# Patient Record
Sex: FEMALE | Race: BLACK OR AFRICAN AMERICAN | NOT HISPANIC OR LATINO | Employment: FULL TIME | URBAN - METROPOLITAN AREA
[De-identification: names, ages, dates, MRNs, and addresses within clinical notes are randomized per-mention and may not be internally consistent; named-entity substitution may affect disease eponyms.]

---

## 2017-01-08 ENCOUNTER — HOSPITAL ENCOUNTER (EMERGENCY)
Facility: HOSPITAL | Age: 37
Discharge: HOME | End: 2017-01-08
Attending: EMERGENCY MEDICINE
Payer: MEDICAID

## 2017-01-08 VITALS
RESPIRATION RATE: 18 BRPM | HEART RATE: 68 BPM | OXYGEN SATURATION: 100 % | DIASTOLIC BLOOD PRESSURE: 68 MMHG | SYSTOLIC BLOOD PRESSURE: 107 MMHG | WEIGHT: 180 LBS | BODY MASS INDEX: 28.25 KG/M2 | HEIGHT: 67 IN | TEMPERATURE: 98 F

## 2017-01-08 DIAGNOSIS — R05.9 COUGH IN ADULT PATIENT: ICD-10-CM

## 2017-01-08 DIAGNOSIS — J02.9 SORE THROAT: ICD-10-CM

## 2017-01-08 DIAGNOSIS — J06.9 UPPER RESPIRATORY TRACT INFECTION, UNSPECIFIED TYPE: Primary | ICD-10-CM

## 2017-01-08 PROCEDURE — 96372 THER/PROPH/DIAG INJ SC/IM: CPT

## 2017-01-08 PROCEDURE — 99283 EMERGENCY DEPT VISIT LOW MDM: CPT | Mod: 25

## 2017-01-08 PROCEDURE — 63600175 PHARM REV CODE 636 W HCPCS: Performed by: EMERGENCY MEDICINE

## 2017-01-08 RX ORDER — BETAMETHASONE SODIUM PHOSPHATE AND BETAMETHASONE ACETATE 3; 3 MG/ML; MG/ML
6 INJECTION, SUSPENSION INTRA-ARTICULAR; INTRALESIONAL; INTRAMUSCULAR; SOFT TISSUE
Status: COMPLETED | OUTPATIENT
Start: 2017-01-08 | End: 2017-01-08

## 2017-01-08 RX ADMIN — BETAMETHASONE SODIUM PHOSPHATE AND BETAMETHASONE ACETATE 6 MG: 3; 3 INJECTION, SUSPENSION INTRA-ARTICULAR; INTRALESIONAL; INTRAMUSCULAR at 11:01

## 2017-01-08 NOTE — ED PROVIDER NOTES
SCRIBE #1 NOTE: I, Deepa Vernon, am scribing for, and in the presence of, Ignacia Mccracken MD. I have scribed the entire note.      History      Chief Complaint   Patient presents with    URI     patient c/o cough, congestion, sinus pressure, body aches and rib pain for the past 2 weeks       Review of patient's allergies indicates:  No Known Allergies     HPI   HPI    1/8/2017, 10:56 AM   History obtained from the patient      History of Present Illness: Janice Jay is a 36 y.o. female patient who presents to the Emergency Department for sore throat which onset gradually 2 weeks ago. Pt also reports dry cough, congestion, L rib pain and pain with coughing.  Symptoms are constant and mild in severity. Prior tx include Zyrtec, Nyquil, and Motrin with no relief. Pt does not report any factors that exacerbate or improve the symptoms. No other sxs reported. Patient denies fever, chills, CP, SOB, abd pain, N/V/D, HA, and all other sxs at this time. No further complaints or concerns at this time.       Arrival mode: Personal vehicle    PCP: Primary Doctor No       Past Medical History:  History reviewed. No pertinent past medical history.    Past Surgical History:  Past Surgical History   Procedure Laterality Date    Breast lumpectomy      Tubal ligation      Hysterectomy           Family History:  Family History   Problem Relation Age of Onset    Heart disease Mother     Hypertension Mother     Diabetes Mother        Social History:  Social History     Social History Main Topics    Smoking status: Current Some Day Smoker     Packs/day: 1.00     Types: Cigarettes    Smokeless tobacco: Unknown    Alcohol use No    Drug use: No    Sexual activity: Unknown       ROS   Review of Systems   Constitutional: Negative for chills and fever.   HENT: Positive for congestion and sore throat. Negative for rhinorrhea.    Respiratory: Positive for cough. Negative for chest tightness and shortness of breath.   "  Cardiovascular: Negative for chest pain.   Gastrointestinal: Negative for nausea.   Genitourinary: Negative for dysuria.   Musculoskeletal: Negative for back pain.        (+) L rib pain   Skin: Negative for rash.   Neurological: Negative for weakness and headaches.   Hematological: Does not bruise/bleed easily.   All other systems reviewed and are negative.      Physical Exam    Initial Vitals   BP Pulse Resp Temp SpO2   01/08/17 1044 01/08/17 1044 01/08/17 1044 01/08/17 1044 01/08/17 1044   130/77 67 18 98.3 °F (36.8 °C) 99 %      Physical Exam  Nursing Notes and Vital Signs Reviewed.  Constitutional: Patient is in no acute distress. Awake and alert. Well-developed and well-nourished.  Head: Atraumatic. Normocephalic.  Eyes: PERRL. EOM intact. Conjunctivae are not pale. No scleral icterus.  ENT: Mucous membranes are moist. Oropharynx is clear and symmetric. No pharyngeal erythema or exudates.   Neck: Supple. Full ROM. No lymphadenopathy.  Cardiovascular: Regular rate. Regular rhythm. No murmurs, rubs, or gallops. Distal pulses are 2+ and symmetric.  Pulmonary/Chest: No respiratory distress. Clear to auscultation bilaterally. No wheezing, rales, or rhonchi.  Abdominal: Soft and non-distended.  There is no tenderness.  No rebound, guarding, or rigidity.  Good bowel sounds.    Genitourinary: No CVA tenderness  Musculoskeletal: Moves all extremities. No obvious deformities. No edema. No calf tenderness.  Skin: Warm and dry.  Neurological:  Alert, awake, and appropriate.  Normal speech.  No acute focal neurological deficits are appreciated.  Psychiatric: Normal affect. Good eye contact. Appropriate in content.    ED Course    Procedures  ED Vital Signs:  Vitals:    01/08/17 1044 01/08/17 1110   BP: 130/77 107/68   Pulse: 67 68   Resp: 18 18   Temp: 98.3 °F (36.8 °C)    TempSrc: Oral    SpO2: 99% 100%   Weight: 81.6 kg (180 lb)    Height: 5' 7" (1.702 m)      The Emergency Provider reviewed the vital signs and test " results, which are outlined above.    ED Discussion     11:00 AM: Reassessed pt at this time. Discussed pt dx and plan of tx. Informed pt to follow up with PCP. All questions and concerns were addressed at this time. Pt expresses understanding of information and instructions, and is comfortable with plan to discharge. Pt is stable for discharge.    I discussed with patient that evaluation in the ED does not suggest any emergent or life threatening medical conditions requiring immediate intervention beyond what was provided in the ED, and I believe patient is safe for discharge.  Regardless, an unremarkable evaluation in the ED does not preclude the development or presence of a serious of life threatening condition. As such, patient was instructed to return immediately for any worsening or change in current symptoms.      ED Medication(s):  Medications   betamethasone acetate-betamethasone sodium phosphate injection 6 mg (6 mg Intramuscular Given 1/8/17 1104)       Discharge Medication List as of 1/8/2017 11:11 AM          Follow-up Information     Follow up with [facility]. Schedule an appointment as soon as possible for a visit in 2 days.    Why:  Return to the Emergency Room, If symptoms worsen    Contact information:    [address] [facility] [address]  [phone]             Medical Decision Making        Additional MDM:   Smoking Cessation: The patient is a smoker. The patient was counseled on smoking cessation for: 3 minutes. The patient was counseled on tobacco related  health complications.        Scribe Attestation:   Scribe #1: I performed the above scribed service and the documentation accurately describes the services I performed. I attest to the accuracy of the note.    Attending:   Physician Attestation Statement for Scribe #1: I, Ignacia Mccracken MD, personally performed the services described in this documentation, as scribed by Deepa Vernon, in my presence, and it is  both accurate and complete.          Clinical Impression       ICD-10-CM ICD-9-CM   1. Upper respiratory tract infection, unspecified type J06.9 465.9   2. Cough in adult patient R05 786.2   3. Sore throat J02.9 462       Disposition:   Disposition: Discharged  Condition: Stable         Ignacia Mccracken MD  01/08/17 3683

## 2017-01-08 NOTE — ED AVS SNAPSHOT
[facility]  [address] [address]               Janice Jay   2017 10:46 AM   ED    Description:  Female : 1980   Department:  [facility]            Your Care was Coordinated By:     Provider Role From To    Ignacia Mccracken MD Attending Provider 17 1046 --      Reason for Visit     URI           Diagnoses this Visit        Comments    Upper respiratory tract infection, unspecified type    -  Primary     Cough in adult patient         Sore throat           ED Disposition     None           To Do List           Follow-up Information     Follow up with [facility]. Schedule an appointment as soon as possible for a visit in 2 days.    Why:  Return to the Emergency Room, If symptoms worsen    Contact information:    [facility] [facility] [address]  [phone]        [facility] On Call     [facility] Nurse Care Line -  Assistance  Registered nurses in the [facility] Center provide clinical advisement, health education, appointment booking, and other advisory services.  Call for this free service at [phone].             Medications           Message regarding Medications     Verify the changes and/or additions to your medication regime listed below are the same as discussed with your clinician today.  If any of these changes or additions are incorrect, please notify your healthcare provider.        These medications were administered today        Dose Freq    betamethasone acetate-betamethasone sodium phosphate injection 6 mg 6 mg ED 1 Time    Sig: Inject 1 mL (6 mg total) into the muscle ED 1 Time.    Class: Normal    Route: Intramuscular           Verify that the below list of medications is an accurate representation of the medications you are currently taking.  If none reported, the list may be blank. If incorrect, please contact your healthcare provider. Carry  "this list with you in case of emergency.           Current Medications     albuterol 90 mcg/actuation inhaler Inhale 2 puffs into the lungs every 4 (four) hours as needed for Wheezing.    cephALEXin (KEFLEX) 500 MG capsule Take 500 mg by mouth 4 (four) times daily.    diclofenac (VOLTAREN) 50 MG EC tablet Take 1 tablet (50 mg total) by mouth 3 (three) times daily as needed.    medroxyPROGESTERone (DEPO-PROVERA) 150 mg/mL injection Inject 150 mg into the muscle every 3 (three) months.    naproxen (NAPROSYN) 375 MG tablet Take 1 tablet (375 mg total) by mouth 2 (two) times daily with meals.    omeprazole (PRILOSEC) 20 MG capsule Take 1 capsule (20 mg total) by mouth once daily.           Clinical Reference Information           Your Vitals Were     BP Pulse Temp Resp Height Weight    130/77 (BP Location: Right arm, Patient Position: Sitting) 67 98.3 °F (36.8 °C) (Oral) 18 5' 7" (1.702 m) 81.6 kg (180 lb)    SpO2 BMI             99% 28.19 kg/m2         Allergies as of 1/8/2017     No Known Allergies      Immunizations Administered on Date of Encounter - 1/8/2017     None      ED Micro, Lab, POCT     None      ED Imaging Orders     None      Discharge References/Attachments     SORE THROATS, SELF-CARE FOR (ENGLISH)    URI, VIRAL, NO ABX (ADULT) (ENGLISH)      [facility] Sign-Up     Activating your [facility] account is as easy as 1-2-3!     1) Visit [email or web address], select Sign Up Now, enter this activation code and your date of birth, then select Next.  [email or web address]  Expires: 2/22/2017 11:11 AM      2) Create a username and password to use when you visit [facility] in the future and select a security question in case you lose your password and select Next.    3) Enter your e-mail address and click Sign Up!    Additional Information  If you have questions, please e-mail [email or web address] or call [phone] to talk to our [facility] staff. Remember, [facility] is NOT to be used for urgent needs. For medical " emergencies, dial 911.         Smoking Cessation     If you would like to quit smoking:   You may be eligible for free services if you are a [place] resident and started smoking cigarettes before September 1, 1988.  Call the Smoking Cessation Trust (SCT) toll free at (639) 018-8277 or (067) 708-3523.   Call 1-800-QUIT-NOW if you do not meet the above criteria.             [facility] complies with applicable Federal civil rights laws and does not discriminate on the basis of race, color, national origin, age, disability, or sex.        Language Assistance Services     ATTENTION: Language assistance services are available, free of charge. Please call [phone].      ATENCIÓN: Si habla español, tiene a goyal disposición servicios gratuitos de asistencia lingüística. Llame al [phone].     CHÚ Ý: N?u b?n nói Ti?ng Vi?t, có các d?ch v? h? tr? ngôn ng? mi?n phí dành cho b?n. G?i s? [phone].

## 2017-02-28 ENCOUNTER — HOSPITAL ENCOUNTER (EMERGENCY)
Facility: HOSPITAL | Age: 37
Discharge: HOME | End: 2017-02-28
Attending: EMERGENCY MEDICINE
Payer: MEDICAID

## 2017-02-28 VITALS
WEIGHT: 200 LBS | HEART RATE: 106 BPM | SYSTOLIC BLOOD PRESSURE: 144 MMHG | DIASTOLIC BLOOD PRESSURE: 87 MMHG | TEMPERATURE: 98 F | HEIGHT: 67 IN | RESPIRATION RATE: 20 BRPM | OXYGEN SATURATION: 96 % | BODY MASS INDEX: 31.39 KG/M2

## 2017-02-28 DIAGNOSIS — S02.5XXA CLOSED FRACTURE OF TOOTH, INITIAL ENCOUNTER: ICD-10-CM

## 2017-02-28 DIAGNOSIS — K08.89 PAIN, DENTAL: Primary | ICD-10-CM

## 2017-02-28 PROCEDURE — 25000003 PHARM REV CODE 250: Performed by: EMERGENCY MEDICINE

## 2017-02-28 PROCEDURE — 99283 EMERGENCY DEPT VISIT LOW MDM: CPT

## 2017-02-28 RX ORDER — HYDROCODONE BITARTRATE AND ACETAMINOPHEN 10; 325 MG/1; MG/1
1 TABLET ORAL
Status: COMPLETED | OUTPATIENT
Start: 2017-02-28 | End: 2017-02-28

## 2017-02-28 RX ORDER — PENICILLIN V POTASSIUM 500 MG/1
500 TABLET, FILM COATED ORAL EVERY 8 HOURS
Qty: 21 TABLET | Refills: 0 | Status: SHIPPED | OUTPATIENT
Start: 2017-02-28 | End: 2017-03-07

## 2017-02-28 RX ORDER — HYDROCODONE BITARTRATE AND ACETAMINOPHEN 7.5; 325 MG/1; MG/1
1 TABLET ORAL EVERY 6 HOURS PRN
Qty: 20 TABLET | Refills: 0 | Status: SHIPPED | OUTPATIENT
Start: 2017-02-28 | End: 2017-09-28

## 2017-02-28 RX ADMIN — HYDROCODONE BITARTRATE AND ACETAMINOPHEN 1 TABLET: 10; 325 TABLET ORAL at 06:02

## 2017-02-28 NOTE — ED AVS SNAPSHOT
[facility]  [address] [facility] [address]               Janice Jay   2017  6:15 PM   ED    Description:  Female : 1980   Department:  [facility]            Your Care was Coordinated By:     Provider Role From To    Juan C Vernon Jr., MD Attending Provider 17 7298 --      Reason for Visit     Dental Pain           Diagnoses this Visit        Comments    Pain, dental    -  Primary     Closed fracture of tooth, initial encounter           ED Disposition     None           To Do List           Follow-up Information     Call [facility].    Why:  As needed to schedule appt with dentist for further treatment of your toothpain/ fractured caried right upper tooth    Contact information:    [address] [facility] [address] [phone]         These Medications        Disp Refills Start End    hydrocodone-acetaminophen 7.5-325mg (NORCO) 7.5-325 mg per tablet 20 tablet 0 2017     Take 1 tablet by mouth every 6 (six) hours as needed for Pain. - Oral    penicillin v potassium (VEETID) 500 MG tablet 21 tablet 0 2017 3/7/2017    Take 1 tablet (500 mg total) by mouth every 8 (eight) hours. - Oral      [facility] On Call     [facility] On Call Nurse Care Line -  Assistance  Registered nurses in the [facility] On Call Center provide clinical advisement, health education, appointment booking, and other advisory services.  Call for this free service at [phone].             Medications           Message regarding Medications     Verify the changes and/or additions to your medication regime listed below are the same as discussed with your clinician today.  If any of these changes or additions are incorrect, please notify your healthcare provider.        START taking these NEW medications        Refills    hydrocodone-acetaminophen 7.5-325mg (NORCO) 7.5-325 mg per tablet 0    Sig: Take 1 tablet  by mouth every 6 (six) hours as needed for Pain.    Class: Print    Route: Oral    penicillin v potassium (VEETID) 500 MG tablet 0    Sig: Take 1 tablet (500 mg total) by mouth every 8 (eight) hours.    Class: Print    Route: Oral      These medications were administered today        Dose Freq    hydrocodone-acetaminophen 10-325mg per tablet 1 tablet 1 tablet ED 1 Time    Sig: Take 1 tablet by mouth ED 1 Time.    Class: Normal    Route: Oral           Verify that the below list of medications is an accurate representation of the medications you are currently taking.  If none reported, the list may be blank. If incorrect, please contact your healthcare provider. Carry this list with you in case of emergency.           Current Medications     albuterol 90 mcg/actuation inhaler Inhale 2 puffs into the lungs every 4 (four) hours as needed for Wheezing.    cephALEXin (KEFLEX) 500 MG capsule Take 500 mg by mouth 4 (four) times daily.    diclofenac (VOLTAREN) 50 MG EC tablet Take 1 tablet (50 mg total) by mouth 3 (three) times daily as needed.    hydrocodone-acetaminophen 10-325mg per tablet 1 tablet Take 1 tablet by mouth ED 1 Time.    hydrocodone-acetaminophen 7.5-325mg (NORCO) 7.5-325 mg per tablet Take 1 tablet by mouth every 6 (six) hours as needed for Pain.    medroxyPROGESTERone (DEPO-PROVERA) 150 mg/mL injection Inject 150 mg into the muscle every 3 (three) months.    naproxen (NAPROSYN) 375 MG tablet Take 1 tablet (375 mg total) by mouth 2 (two) times daily with meals.    omeprazole (PRILOSEC) 20 MG capsule Take 1 capsule (20 mg total) by mouth once daily.    penicillin v potassium (VEETID) 500 MG tablet Take 1 tablet (500 mg total) by mouth every 8 (eight) hours.           Clinical Reference Information           Your Vitals Were     BP                   144/87 (BP Location: Right arm, Patient Position: Sitting)           Allergies as of 2/28/2017     No Known Allergies      Immunizations Administered on Date of  Encounter - 2/28/2017     None      ED Micro, Lab, POCT     None      ED Imaging Orders     None      Discharge References/Attachments     DENTAL PAIN (ENGLISH)    TOOTH DECAY, UNDERSTANDING (ENGLISH)      [facility] Sign-Up     Activating your [facility] account is as easy as 1-2-3!     1) Visit [email or web address], select Sign Up Now, enter this activation code and your date of birth, then select Next.  [email or web address]  Expires: 4/14/2017  6:22 PM      2) Create a username and password to use when you visit [facility] in the future and select a security question in case you lose your password and select Next.    3) Enter your e-mail address and click Sign Up!    Additional Information  If you have questions, please e-mail [email or web address] or call [phone] to talk to our [facility] staff. Remember, [facility] is NOT to be used for urgent needs. For medical emergencies, dial 911.         Smoking Cessation     If you would like to quit smoking:   You may be eligible for free services if you are a [place] resident and started smoking cigarettes before September 1, 1988.  Call the Smoking Cessation Trust ([facility]) toll free at (923) 819-6688 or (148) 681-9046.   Call 2-883-QUIT-NOW if you do not meet the above criteria.             [facility] complies with applicable Federal civil rights laws and does not discriminate on the basis of race, color, national origin, age, disability, or sex.        Language Assistance Services     ATTENTION: Language assistance services are available, free of charge. Please call [phone].      ATENCIÓN: Si habla español, tiene a goyal disposición servicios gratuitos de asistencia lingüística. Llame al [phone].     CHÚ Ý: N?u b?n nói Ti?ng Vi?t, có các d?ch v? h? tr? ngôn ng? mi?n phí dành cho b?n. G?i s? [phone].

## 2017-03-01 NOTE — ED PROVIDER NOTES
SCRIBE #1 NOTE: I, Kamlesh Jovel Kathe, am scribing for, and in the presence of, Juan C Vernon Jr., MD. I have scribed the entire note.      History      Chief Complaint   Patient presents with    Dental Pain     Pt reports right upper dental pain since yesterday       Review of patient's allergies indicates:  No Known Allergies     HPI   HPI    2/28/2017, 6:15 PM   History obtained from the patient      History of Present Illness: Janice Jay is a 36 y.o. female patient who presents to the Emergency Department for right upper dental pain which onset gradually yesterday. Sxs are constant and moderate in severity. There are no mitigating or exacerbating factors noted. No associated sxs. Pt denies any fever, N/V/D, cough, congestion, abd pain, neck pain, facial swelling, trauma, and all other sxs at this time. No further complaints or concerns at this time.       Arrival mode: Personal vehicle      PCP: Primary Doctor No       Past Medical History:  Unknown    Past Surgical History:  Past Surgical History:   Procedure Laterality Date    BREAST LUMPECTOMY      HYSTERECTOMY      TUBAL LIGATION           Family History:  Family History   Problem Relation Age of Onset    Heart disease Mother     Hypertension Mother     Diabetes Mother        Social History:  Social History     Social History Main Topics    Smoking status: Current Some Day Smoker     Packs/day: 1.00     Types: Cigarettes    Smokeless tobacco: Unknown    Alcohol use No    Drug use: No    Sexual activity: Unknown       ROS   Review of Systems   Constitutional: Negative for fever.   HENT: Positive for dental problem. Negative for sore throat.    Respiratory: Negative for shortness of breath.    Cardiovascular: Negative for chest pain.   Gastrointestinal: Negative for nausea.   Genitourinary: Negative for dysuria.   Musculoskeletal: Negative for back pain.   Skin: Negative for rash.   Neurological: Negative for weakness.  "  Hematological: Does not bruise/bleed easily.     Physical Exam    Initial Vitals   BP Pulse Resp Temp SpO2   02/28/17 1811 02/28/17 1811 02/28/17 1811 02/28/17 1811 02/28/17 1811   144/87 106 20 98.4 °F (36.9 °C) 96 %      Physical Exam  Nursing Notes and Vital Signs Reviewed.  Constitutional: Patient is in no acute distress. Awake and alert. Well-developed and well-nourished.  Head: Atraumatic. Normocephalic.  Eyes: PERRL. EOM intact. Conjunctivae are not pale. No scleral icterus.  ENT: Mucous membranes are moist. Oropharynx is clear and symmetric.    Mouth/Throat: No evident facial swelling. Right upper premolar pain with dental lisbeth. Patient handles secretions normally.negative for gingival edema. No palpable fluctuance. No evidence of periodontal or periapical abscess. No trismus.   Neck: Supple. Full ROM. No lymphadenopathy.  Cardiovascular: Regular rate. Regular rhythm. No murmurs, rubs, or gallops. Distal pulses are 2+ and symmetric.  Pulmonary/Chest: No respiratory distress. Clear to auscultation bilaterally. No wheezing, rales, or rhonchi.  Abdominal: Soft and non-distended.  There is no tenderness.  No rebound, guarding, or rigidity. Good bowel sounds.  Genitourinary: No CVA tenderness  Musculoskeletal: Moves all extremities. No obvious deformities. No edema. No calf tenderness.  Skin: Warm and dry.  Neurological:  Alert, awake, and appropriate.  Normal speech.  No acute focal neurological deficits are appreciated.  Psychiatric: Normal affect. Good eye contact. Appropriate in content.    ED Course    Procedures  ED Vital Signs:  Vitals:    02/28/17 1811   BP: (!) 144/87   Pulse: 106   Resp: 20   Temp: 98.4 °F (36.9 °C)   TempSrc: Tympanic   SpO2: 96%   Weight: 90.7 kg (200 lb)   Height: 5' 7" (1.702 m)                The Emergency Provider reviewed the vital signs and test results, which are outlined above.    ED Discussion     6:23 PM:  Discussed plan of treatment with pt. Gave pt all f/u and return to " the ED instructions. All questions and concerns were addressed at this time. Pt understands and agrees to plan as discussed. Pt is stable for discharge.     Pre-hypertension/Hypertension: The pt has been informed that they may have pre-hypertension or hypertension based on a blood pressure reading in the ED. I recommend that the pt call the PCP listed on their discharge instructions or a physician of their choice this week to arrange f/u for further evaluation of possible pre-hypertension or hypertension.       ED Medication(s):  Medications   hydrocodone-acetaminophen 10-325mg per tablet 1 tablet (1 tablet Oral Given 2/28/17 4805)       New Prescriptions    HYDROCODONE-ACETAMINOPHEN 7.5-325MG (NORCO) 7.5-325 MG PER TABLET    Take 1 tablet by mouth every 6 (six) hours as needed for Pain.    PENICILLIN V POTASSIUM (VEETID) 500 MG TABLET    Take 1 tablet (500 mg total) by mouth every 8 (eight) hours.       Follow-up Information     Call [facility].    Why:  As needed to schedule appt with dentist for further treatment of your toothpain/ fractured caried right upper tooth    Contact information:    [facility] [address]  [phone]              Medical Decision Making              Scribe Attestation:   Scribe #1: I performed the above scribed service and the documentation accurately describes the services I performed. I attest to the accuracy of the note.    Attending:   Physician Attestation Statement for Scribe #1: I, Juan C Vernon Jr., MD, personally performed the services described in this documentation, as scribed by Kamlesh Archuleta, in my presence, and it is both accurate and complete.          Clinical Impression       ICD-10-CM ICD-9-CM   1. Pain, dental K08.89 525.9   2. Closed fracture of tooth, initial encounter S02.5XXA 873.63       Disposition:   Disposition: Discharged  Condition: Stable         Juan C Vernon Jr., MD  02/28/17 5743

## 2017-07-06 ENCOUNTER — HOSPITAL ENCOUNTER (EMERGENCY)
Facility: HOSPITAL | Age: 37
Discharge: HOME | End: 2017-07-07
Attending: EMERGENCY MEDICINE
Payer: MEDICAID

## 2017-07-06 VITALS
HEART RATE: 83 BPM | DIASTOLIC BLOOD PRESSURE: 81 MMHG | OXYGEN SATURATION: 96 % | RESPIRATION RATE: 20 BRPM | WEIGHT: 205 LBS | SYSTOLIC BLOOD PRESSURE: 128 MMHG | TEMPERATURE: 98 F | BODY MASS INDEX: 32.18 KG/M2 | HEIGHT: 67 IN

## 2017-07-06 DIAGNOSIS — M54.9 MID BACK PAIN ON LEFT SIDE: Primary | ICD-10-CM

## 2017-07-06 LAB — B-HCG UR QL: NEGATIVE

## 2017-07-06 PROCEDURE — 81025 URINE PREGNANCY TEST: CPT

## 2017-07-06 PROCEDURE — 99283 EMERGENCY DEPT VISIT LOW MDM: CPT

## 2017-07-06 PROCEDURE — 81000 URINALYSIS NONAUTO W/SCOPE: CPT

## 2017-07-07 LAB
BILIRUB UR QL STRIP: NEGATIVE
CLARITY UR: CLEAR
COLOR UR: YELLOW
GLUCOSE UR QL STRIP: NEGATIVE
HGB UR QL STRIP: ABNORMAL
KETONES UR QL STRIP: ABNORMAL
LEUKOCYTE ESTERASE UR QL STRIP: ABNORMAL
MICROSCOPIC COMMENT: NORMAL
NITRITE UR QL STRIP: NEGATIVE
PH UR STRIP: 6 [PH] (ref 5–8)
PROT UR QL STRIP: NEGATIVE
RBC #/AREA URNS HPF: 1 /HPF (ref 0–4)
SP GR UR STRIP: 1.02 (ref 1–1.03)
SQUAMOUS #/AREA URNS HPF: 3 /HPF
URN SPEC COLLECT METH UR: ABNORMAL
UROBILINOGEN UR STRIP-ACNC: ABNORMAL EU/DL
WBC #/AREA URNS HPF: 1 /HPF (ref 0–5)

## 2017-07-07 RX ORDER — CYCLOBENZAPRINE HCL 10 MG
5 TABLET ORAL 3 TIMES DAILY PRN
Qty: 15 TABLET | Refills: 0 | Status: SHIPPED | OUTPATIENT
Start: 2017-07-07 | End: 2017-09-30 | Stop reason: ALTCHOICE

## 2017-07-07 NOTE — ED PROVIDER NOTES
SCRIBE #1 NOTE: I, Christine Beltran, am scribing for, and in the presence of, Cristian Luna MD. I have scribed the entire note.      History      Chief Complaint   Patient presents with    Flank Pain     L flank pain on and off x 1 month. +discolored urine and urgency       Review of patient's allergies indicates:  No Known Allergies     HPI   HPI    7/6/2017, 11:24 PM   History obtained from the patient      History of Present Illness: Janice Kerr is a 36 y.o. female patient who presents to the Emergency Department for L sided flank pain which onset gradually a month ago. Symptoms are intermittent and moderate in severity. No mitigating or exacerbating factors reported. Associated sxs include urinary urgency. Pt denies injury. Prior tx includes Alevel and Advil with minimal relief. Patient denies any fever, chills, abdominal pain, dysuria, hematuria, N/V, and all other sxs at this time. No further complaints or concerns at this time.     Arrival mode: Personal vehicle      PCP: Primary Doctor No       Past Medical History:  Past Medical History:   Diagnosis Date    Asthma        Past Surgical History:  Past Surgical History:   Procedure Laterality Date    BREAST LUMPECTOMY      HYSTERECTOMY      TUBAL LIGATION           Family History:  Family History   Problem Relation Age of Onset    Heart disease Mother     Hypertension Mother     Diabetes Mother        Social History:  Social History     Social History Main Topics    Smoking status: Current Some Day Smoker     Packs/day: 2.00     Types: Cigarettes    Smokeless tobacco: Never Used    Alcohol use No    Drug use: No    Sexual activity: Yes     Partners: Male       ROS   Review of Systems   Constitutional: Negative for chills and fever.   HENT: Negative for sore throat.    Respiratory: Negative for shortness of breath.    Cardiovascular: Negative for chest pain.   Gastrointestinal: Negative for abdominal pain, nausea and vomiting.    Genitourinary: Positive for flank pain (L sided) and urgency. Negative for dysuria and hematuria.   Musculoskeletal: Negative for back pain.   Skin: Negative for rash.   Neurological: Negative for weakness.   Hematological: Does not bruise/bleed easily.   All other systems reviewed and are negative.      Physical Exam      Initial Vitals [07/06/17 2200]   BP Pulse Resp Temp SpO2   128/81 83 20 98.1 °F (36.7 °C) 96 %      MAP       96.67          Physical Exam  Nursing Notes and Vital Signs Reviewed.  Constitutional: Patient is in no acute distress. Well-developed and well-nourished.  Head: Atraumatic. Normocephalic.  Eyes: PERRL. EOM intact. Conjunctivae are not pale. No scleral icterus.  ENT: Mucous membranes are moist. Oropharynx is clear and symmetric.    Neck: Supple. Full ROM. No lymphadenopathy.  Cardiovascular: Regular rate. Regular rhythm. No murmurs, rubs, or gallops. Distal pulses are 2+ and symmetric.  Pulmonary/Chest: No respiratory distress. Clear to auscultation bilaterally. No wheezing, rales, or rhonchi.  Abdominal: Soft and non-distended.  There is no tenderness.  No rebound, guarding, or rigidity. Good bowel sounds.  Genitourinary: No CVA tenderness  Musculoskeletal: Moves all extremities. No obvious deformities. No edema. No calf tenderness.  Back: Mild left midback tenderness to palpation. No midline bony tenderness, deformities, or step-offs of the T-spine or L-spine. Skin appears normal without abrasions or bruising. No erythema, induration, or fluctuance.   Skin: Warm and dry.  Neurological: Awake and alert. Appropriate for age. Negative straight leg raise bilaterally. No strength deficit; equal and 5/5 in bilateral upper and lower extremities. No light touch sensory deficit. DTRs 2+ and equal. Normal gait. No acute focal neurological deficits noted.  Psychiatric: Normal affect. Good eye contact. Appropriate in content.    ED Course    Procedures  ED Vital Signs:  Vitals:    07/06/17 2200  "  BP: 128/81   Pulse: 83   Resp: 20   Temp: 98.1 °F (36.7 °C)   TempSrc: Oral   SpO2: 96%   Weight: 93 kg (205 lb)   Height: 5' 7" (1.702 m)       Abnormal Lab Results:  Labs Reviewed   URINALYSIS - Abnormal; Notable for the following:        Result Value    Ketones, UA Trace (*)     Occult Blood UA Trace (*)     Urobilinogen, UA 2.0-3.0 (*)     Leukocytes, UA Trace (*)     All other components within normal limits   PREGNANCY TEST, URINE RAPID   URINALYSIS MICROSCOPIC        All Lab Results:  Results for orders placed or performed during the hospital encounter of 07/06/17   Urinalysis Clean Catch   Result Value Ref Range    Specimen UA Urine, Clean Catch     Color, UA Yellow Yellow, Straw, Hafsa    Appearance, UA Clear Clear    pH, UA 6.0 5.0 - 8.0    Specific Gravity, UA 1.020 1.005 - 1.030    Protein, UA Negative Negative    Glucose, UA Negative Negative    Ketones, UA Trace (A) Negative    Bilirubin (UA) Negative Negative    Occult Blood UA Trace (A) Negative    Nitrite, UA Negative Negative    Urobilinogen, UA 2.0-3.0 (A) <2.0 EU/dL    Leukocytes, UA Trace (A) Negative   Pregnancy, urine rapid (UPT)   Result Value Ref Range    Preg Test, Ur Negative    Urinalysis Microscopic   Result Value Ref Range    RBC, UA 1 0 - 4 /hpf    WBC, UA 1 0 - 5 /hpf    Squam Epithel, UA 3 /hpf    Microscopic Comment SEE COMMENT        Imaging Results:  Imaging Results    None                 The Emergency Provider reviewed the vital signs and test results, which are outlined above.    ED Discussion     12:29 AM: Reassessed pt at this time.  Discussed with pt all pertinent ED information and results. Discussed pt dx of mid back pain on left side and plan of tx. Gave pt all f/u and return to the ED instructions. All questions and concerns were addressed at this time. Pt expresses understanding of information and instructions, and is comfortable with plan to discharge. Pt is stable for discharge.    I discussed with patient and/or " family/caretaker that evaluation in the ED does not suggest any emergent or life threatening medical conditions requiring immediate intervention beyond what was provided in the ED, and I believe patient is safe for discharge.  Regardless, an unremarkable evaluation in the ED does not preclude the development or presence of a serious of life threatening condition. As such, patient was instructed to return immediately for any worsening or change in current symptoms.    ED Medication(s):  Medications - No data to display    Discharge Medication List as of 7/7/2017 12:28 AM          Follow-up Information     [facility] In 4 days.    Contact information:  [address] [facility] [id number] [phone]             [facility] - .    Specialty:  Emergency Medicine  Why:  As needed  Contact information:  [id number] [place] [id number] [phone]                   Medical Decision Making    Medical Decision Making:   Clinical Tests:   Lab Tests: Ordered and Reviewed           Scribe Attestation:   Scribe #1: I performed the above scribed service and the documentation accurately describes the services I performed. I attest to the accuracy of the note.    Attending:   Physician Attestation Statement for Scribe #1: I, Cristian Luna MD, personally performed the services described in this documentation, as scribed by Christine Gong, in my presence, and it is both accurate and complete.          Clinical Impression       ICD-10-CM ICD-9-CM   1. Mid back pain on left side M54.9 724.5       Disposition:   Disposition: Discharged  Condition: Stable         Cristian Luna MD  07/07/17 0546

## 2017-08-29 ENCOUNTER — HOSPITAL ENCOUNTER (EMERGENCY)
Facility: HOSPITAL | Age: 37
Discharge: HOME | End: 2017-08-29
Payer: MEDICAID

## 2017-08-29 VITALS
WEIGHT: 200 LBS | TEMPERATURE: 99 F | DIASTOLIC BLOOD PRESSURE: 67 MMHG | OXYGEN SATURATION: 100 % | HEART RATE: 82 BPM | SYSTOLIC BLOOD PRESSURE: 142 MMHG | RESPIRATION RATE: 18 BRPM | BODY MASS INDEX: 31.39 KG/M2 | HEIGHT: 67 IN

## 2017-08-29 DIAGNOSIS — J06.9 VIRAL URI WITH COUGH: Primary | ICD-10-CM

## 2017-08-29 DIAGNOSIS — J02.9 SORE THROAT: ICD-10-CM

## 2017-08-29 LAB — DEPRECATED S PYO AG THROAT QL EIA: NEGATIVE

## 2017-08-29 PROCEDURE — 87081 CULTURE SCREEN ONLY: CPT

## 2017-08-29 PROCEDURE — 99283 EMERGENCY DEPT VISIT LOW MDM: CPT

## 2017-08-29 PROCEDURE — 87880 STREP A ASSAY W/OPTIC: CPT

## 2017-08-29 NOTE — ED PROVIDER NOTES
Encounter Date: 8/29/2017       History     Chief Complaint   Patient presents with    Sore Throat     37 yo BF c/o sore throat, cough and fever of 101F at home for the past 3 days      The history is provided by the patient.   Sore Throat   This is a new problem. The current episode started more than 2 days ago. The problem occurs constantly. The problem has not changed since onset.Nothing aggravates the symptoms. Nothing relieves the symptoms. She has tried acetaminophen for the symptoms. The treatment provided mild relief.     Review of patient's allergies indicates:  No Known Allergies  Past Medical History:   Diagnosis Date    Asthma      Past Surgical History:   Procedure Laterality Date    BREAST LUMPECTOMY      HYSTERECTOMY      TUBAL LIGATION       Family History   Problem Relation Age of Onset    Heart disease Mother     Hypertension Mother     Diabetes Mother      Social History   Substance Use Topics    Smoking status: Current Some Day Smoker     Packs/day: 2.00     Types: Cigarettes    Smokeless tobacco: Never Used    Alcohol use No     Review of Systems   Constitutional: Positive for chills, diaphoresis and fever.   HENT: Positive for congestion and sore throat.    Respiratory: Positive for cough.    Cardiovascular: Negative.    Gastrointestinal: Negative.    Musculoskeletal: Negative.    Skin: Negative.    Neurological: Negative.        Physical Exam     Initial Vitals [08/29/17 1327]   BP Pulse Resp Temp SpO2   (!) 142/67 82 18 99.4 °F (37.4 °C) 100 %      MAP       92         Physical Exam    Nursing note and vitals reviewed.  Constitutional: She appears well-developed and well-nourished. She is not diaphoretic. No distress.   HENT:   Head: Normocephalic and atraumatic.   Right Ear: Tympanic membrane, external ear and ear canal normal.   Left Ear: Tympanic membrane, external ear and ear canal normal.   Mouth/Throat: Uvula is midline, oropharynx is clear and moist and mucous membranes are  normal. No oropharyngeal exudate, posterior oropharyngeal edema, posterior oropharyngeal erythema or tonsillar abscesses.   Eyes: Conjunctivae are normal.   Neck: Normal range of motion and full passive range of motion without pain. Neck supple. No Brudzinski's sign and no Kernig's sign noted.   Cardiovascular: Normal rate and regular rhythm.   Pulmonary/Chest: Breath sounds normal. No respiratory distress.   Lymphadenopathy:     She has cervical adenopathy.   Neurological: She is alert and oriented to person, place, and time.   Skin: Skin is warm and dry. Capillary refill takes less than 2 seconds. No rash noted. No erythema.   Psychiatric: She has a normal mood and affect.         ED Course   Procedures  Labs Reviewed   THROAT SCREEN, RAPID                    Results for orders placed or performed during the hospital encounter of 08/29/17   Rapid strep screen   Result Value Ref Range    Rapid Strep A Screen Negative Negative     Afebrile, non toxic appearance, and unremarkable exam. There is no evidence to suggest OM/OE, ABRS, bronchitis, PNA, or strep pharyngitis.     No chest X-ray today due to normal Pulmonary exam/No Adventitious sounds.    No test for Influenza as symptoms have been present for greater than 2 days.  Pt has no significant co-morbidities/PMH to indicate that treatment at this point would be beneficial.    At this time I feel this to be a self limited viral illness and supportive care is advised. Lack of benefit to abx discussed with patient, who verbalized understanding of diagnosis and plan                ED Course     Clinical Impression:   The primary encounter diagnosis was Viral URI with cough. A diagnosis of Sore throat was also pertinent to this visit.                           Bret Ríos PA-C  08/29/17 1390

## 2017-08-29 NOTE — ED NOTES
Bed: RWR 02  Expected date:   Expected time:   Means of arrival:   Comments:  Intake 24     Bret Ríos PA-C  08/29/17 1818

## 2017-08-29 NOTE — DISCHARGE INSTRUCTIONS
"Ibuprofen 600 mg every 6-8 hours and/or  Tylenol 650-1000 mg every 4-6 hours for fever or pain relief. No more than 4000mg of Tylenol every 24 hours.  Do not take Ibuprofen and Naproxen at the same time.    Your Upper Respiratory illness or Bronchitis (bronchial infection, or "chest cold")  is consistent with a viral disease. In this particular case, antibiotics are unlikely to benefit you; rather, they may cause you numerous side effects. Drink plenty of fluids and get lots of rest. Use prescribed medicines as directed. These symptoms can last anywhere from 3-6 weeks.    Be aware of any common medications that may also be in standard over the counter products.      Follow up if you develop a fever (temp >100.5) lasting longer than 24 hours or worsening symptoms.     Tylenol Cold and Sinus, Advil Cold and Sinus, Theraflu are just a few of the over the counter medications that can give you symptom relief    If you have High blood pressure you can try Coricidin HBP for symptoms relief.    Nasal Saline spray/rinse will also help improve your symptoms.  Try this 2-3 times a day.  You may substitute Flonase for 1-2 of these per day    Zyrtec, Allegra or Claritin to help dry mucus and decrease post nasal drip    Some of these medications typically include Ibuprofen or Tylenol for fever and pain relief in addition to the cough and cold medications.    Maximum Dosing for Tylenol in a 4 hour period is 1000 mg and in a 24 hour period is 4000mg    Warm salt water gargles for throat comfort    "

## 2017-09-01 LAB — BACTERIA THROAT CULT: NORMAL

## 2017-09-24 ENCOUNTER — HOSPITAL ENCOUNTER (EMERGENCY)
Facility: HOSPITAL | Age: 37
Discharge: HOME | End: 2017-09-24
Attending: EMERGENCY MEDICINE
Payer: MEDICAID

## 2017-09-24 VITALS
OXYGEN SATURATION: 98 % | SYSTOLIC BLOOD PRESSURE: 113 MMHG | BODY MASS INDEX: 31.08 KG/M2 | DIASTOLIC BLOOD PRESSURE: 71 MMHG | HEART RATE: 93 BPM | WEIGHT: 198 LBS | HEIGHT: 67 IN | RESPIRATION RATE: 20 BRPM | TEMPERATURE: 100 F

## 2017-09-24 DIAGNOSIS — S82.65XA CLOSED NONDISPLACED FRACTURE OF LATERAL MALLEOLUS OF LEFT FIBULA, INITIAL ENCOUNTER: Primary | ICD-10-CM

## 2017-09-24 DIAGNOSIS — M79.672 LEFT FOOT PAIN: ICD-10-CM

## 2017-09-24 DIAGNOSIS — S89.92XA LEG INJURY, LEFT, INITIAL ENCOUNTER: ICD-10-CM

## 2017-09-24 PROCEDURE — 25000003 PHARM REV CODE 250: Performed by: NURSE PRACTITIONER

## 2017-09-24 PROCEDURE — 99283 EMERGENCY DEPT VISIT LOW MDM: CPT | Mod: 25

## 2017-09-24 PROCEDURE — 29515 APPLICATION SHORT LEG SPLINT: CPT | Mod: LT

## 2017-09-24 RX ORDER — MELOXICAM 15 MG/1
15 TABLET ORAL DAILY
Qty: 30 TABLET | Refills: 0 | Status: ON HOLD | OUTPATIENT
Start: 2017-09-24 | End: 2017-09-29 | Stop reason: HOSPADM

## 2017-09-24 RX ORDER — HYDROCODONE BITARTRATE AND ACETAMINOPHEN 10; 325 MG/1; MG/1
1 TABLET ORAL
Status: COMPLETED | OUTPATIENT
Start: 2017-09-24 | End: 2017-09-24

## 2017-09-24 RX ORDER — HYDROCODONE BITARTRATE AND ACETAMINOPHEN 5; 325 MG/1; MG/1
1 TABLET ORAL EVERY 4 HOURS PRN
Qty: 12 TABLET | Refills: 0 | Status: SHIPPED | OUTPATIENT
Start: 2017-09-24 | End: 2017-09-28

## 2017-09-24 RX ADMIN — HYDROCODONE BITARTRATE AND ACETAMINOPHEN 1 TABLET: 10; 325 TABLET ORAL at 08:09

## 2017-09-25 NOTE — ED PROVIDER NOTES
SCRIBE #1 NOTE: I, Deepa Vernon, am scribing for, and in the presence of, Victor Manuel Santos NP. I have scribed the entire note.      History      Chief Complaint   Patient presents with    Leg Injury     Left lower leg, ankle, and foot pain after swing fell on it pta; swelling noted to L foot       Review of patient's allergies indicates:  No Known Allergies     HPI   HPI    9/24/2017, 8:28 PM   History obtained from the patient      History of Present Illness: Janice Kerr is a 37 y.o. female patient who presents to the Emergency Department for LLE pain which onset suddenly after the swing collapsed and landed on her leg tonight. Pain is constant and moderate in severity. Pain exacerbated with palpation and movement and extends from her leg, to her ankle and foot. No mitigating factors reported. Associated sx include joint swelling and an abrasion to her foot. Patient denies extremity weakness, extremity numbness, paresthesias, and all other sxs at this time. Tetanus is UTD. No further complaints or concerns at this time.     Arrival mode: Personal vehicle    PCP: Camila Ruby MD       Past Medical History:  Past Medical History:   Diagnosis Date    Asthma        Past Surgical History:  Past Surgical History:   Procedure Laterality Date    BREAST LUMPECTOMY      HYSTERECTOMY      TUBAL LIGATION           Family History:  Family History   Problem Relation Age of Onset    Heart disease Mother     Hypertension Mother     Diabetes Mother        Social History:  Social History     Social History Main Topics    Smoking status: Current Some Day Smoker     Packs/day: 2.00     Types: Cigarettes    Smokeless tobacco: Never Used    Alcohol use No    Drug use: No    Sexual activity: Yes     Partners: Male       ROS   Review of Systems   Constitutional: Negative for fever.   HENT: Negative for sore throat.    Respiratory: Negative for shortness of breath.    Cardiovascular: Negative for  chest pain.   Gastrointestinal: Negative for nausea.   Genitourinary: Negative for dysuria.   Musculoskeletal: Positive for arthralgias (L foot). Negative for back pain.        (+) LLE pain   Skin: Negative for rash.   Neurological: Negative for weakness.   Hematological: Does not bruise/bleed easily.   All other systems reviewed and are negative.      Physical Exam      Initial Vitals [09/24/17 1955]   BP Pulse Resp Temp SpO2   113/83 (!) 111 20 99.9 °F (37.7 °C) 98 %      MAP       93          Physical Exam  Nursing Notes and Vital Signs Reviewed.  Constitutional: Patient is in no acute distress. Awake and alert. Well-developed and well-nourished.  Head: Atraumatic. Normocephalic.  Eyes: PERRL. EOM intact. Conjunctivae are not pale. No scleral icterus.  ENT: Mucous membranes are moist. Oropharynx is clear and symmetric.    Neck: Supple. Full ROM.  Cardiovascular: Regular rate. Regular rhythm. No murmurs, rubs, or gallops. Distal pulses are 2+ and symmetric.  Pulmonary/Chest: No respiratory distress. Clear to auscultation bilaterally. No wheezing, rales, or rhonchi.  Abdominal: Soft. Non-distended.  Musculoskeletal: Moves all extremities. No obvious deformities.   LLE: Swelling and TTP to malleolus and diffuse TTP to foot. Negative Singh's test. Cap refill distally is <2 seconds. DP and PT pulses are equal and 2+ bilaterally. No motor deficit. No distal sensory deficit  Skin: Warm and dry. Abrasion to the medial aspect of her L foot.  Neurological:  Alert, awake, and appropriate.  Normal speech.  No acute focal neurological deficits are appreciated.  Psychiatric: Normal affect. Good eye contact. Appropriate in content.    ED Course    Orthopedic Injury  Date/Time: 9/24/2017 9:16 PM  Authorized by: NADINE PENN JR   Performed by: VINAYAK BLAND  Injury location: ankle  Location details: left ankle  Injury type: fracture  Fracture type: lateral malleolus  Pre-procedure distal perfusion: normal  Pre-procedure  "neurological function: normal  Pre-procedure neurovascular assessment: neurovascularly intact  Manipulation performed: no  Immobilization: splint and crutches  Splint type: posterior splint with stirrups.  Supplies used: Ortho-Glass  Complications: No  Specimens: No  Implants: No  Post-procedure neurovascular assessment: post-procedure neurovascularly intact  Post-procedure distal perfusion: normal  Post-procedure neurological function: normal  Patient tolerance: Patient tolerated the procedure well with no immediate complications        ED Vital Signs:  Vitals:    09/24/17 1955 09/24/17 2140   BP: 113/83 113/71   Pulse: (!) 111 93   Resp: 20 20   Temp: 99.9 °F (37.7 °C)    TempSrc: Oral    SpO2: 98%    Weight: 89.8 kg (198 lb)    Height: 5' 7" (1.702 m)      Imaging Results:  Imaging Results          X-Ray Foot Complete Left (Final result)  Result time 09/24/17 21:17:27    Final result by Harshad Henao Jr., MD (09/24/17 21:17:27)                 Impression:     Obliquely oriented fracture of the lateral malleolus without additional fracture of the left foot evident.      Electronically signed by: HARSHAD HENAO M.D.  Date:     09/24/17  Time:    21:17              Narrative:    XR FOOT COMPLETE 3 VIEW LEFT    Clinical history: Trauma.    Findings: Again demonstrated is the previously described obliquely oriented fracture of the lateral malleolus of the left ankle.  No additional fractures are evident on these views of the left foot. Joint alignment is anatomic. No significant arthritic changes identified.                             X-Ray Ankle Complete Left (Final result)  Result time 09/24/17 21:16:05    Final result by Harshad Henao Jr., MD (09/24/17 21:16:05)                 Impression:     Acute fracture of the lateral malleolus of the left ankle.          Electronically signed by: HARSHAD HENAO M.D.  Date:     09/24/17  Time:    21:16              Narrative:    XR ANKLE COMPLETE 3 VIEW LEFT    Clinical " history: Trauma.    Findings: There is an obliquely oriented fracture of the lateral malleolus. Joint alignment is anatomic. No significant arthritic changes identified.No osteochondral defect identified.  There is prominent soft tissue swelling about the lateral aspect of the left ankle.                               The Emergency Provider reviewed the vital signs and test results, which are outlined above.    ED Discussion     9:25 PM: D/w patient that imaging results reveal a fracture. Discussed pt dx and plan of tx. Patient will be discharged with a splint and crutches. Patient given crutch training prior to being discharged. Informed patient to f/u with Ortho. All questions and concerns were addressed at this time. Pt expresses understanding of information and instructions, and is comfortable with plan to discharge. Pt is stable for discharge.    I discussed with patient and/or family/caretaker that evaluation in the ED does not suggest any emergent or life threatening medical conditions requiring immediate intervention beyond what was provided in the ED, and I believe patient is safe for discharge.  Regardless, an unremarkable evaluation in the ED does not preclude the development or presence of a serious of life threatening condition. As such, patient was instructed to return immediately for any worsening or change in current symptoms.    ED Medication(s):  Medications   hydrocodone-acetaminophen 10-325mg per tablet 1 tablet (1 tablet Oral Given 9/24/17 2046)       Discharge Medication List as of 9/24/2017  9:21 PM      START taking these medications    Details   hydrocodone-acetaminophen 5-325mg (NORCO) 5-325 mg per tablet Take 1 tablet by mouth every 4 (four) hours as needed., Starting Sun 9/24/2017, Print      meloxicam (MOBIC) 15 MG tablet Take 1 tablet (15 mg total) by mouth once daily. Take with breakfast, Starting Sun 9/24/2017, Print             Follow-up Information     Camila Ruby MD.  Schedule an appointment as soon as possible for a visit in 2 days.    Specialty:  Internal Medicine  Contact information:  [address] [facility] [place] [address] [phone]             [facility]. Schedule an appointment as soon as possible for a visit in 2 days.    Specialty:  Orthopedics  Contact information:  [address] [facility] [address]  [facility] [address] [phone]  Additional information:  (off [facility]) 1st floor                  Medical Decision Making    Medical Decision Making:   Clinical Tests:   Radiological Study: Ordered and Reviewed           Scribe Attestation:   Scribe #1: I performed the above scribed service and the documentation accurately describes the services I performed. I attest to the accuracy of the note.    Attending:   Physician Attestation Statement for Scribe #1: I, Victor Manuel Santos NP, personally performed the services described in this documentation, as scribed by Deepa Vernon, in my presence, and it is both accurate and complete.          Clinical Impression       ICD-10-CM ICD-9-CM   1. Closed nondisplaced fracture of lateral malleolus of left fibula, initial encounter S82.65XA 824.2   2. Leg injury, left, initial encounter S89.92XA 959.7   3. Left foot pain M79.672 729.5       Disposition:   Disposition: Discharged  Condition: Stable         Victor Manuel Santos NP  09/25/17 0120

## 2017-09-28 ENCOUNTER — HOSPITAL ENCOUNTER (OUTPATIENT)
Dept: RADIOLOGY | Facility: HOSPITAL | Age: 37
Discharge: HOME | End: 2017-09-28
Attending: ORTHOPAEDIC SURGERY
Payer: MEDICAID

## 2017-09-28 ENCOUNTER — ANESTHESIA EVENT (OUTPATIENT)
Dept: SURGERY | Facility: HOSPITAL | Age: 37
End: 2017-09-28
Payer: MEDICAID

## 2017-09-28 ENCOUNTER — OFFICE VISIT (OUTPATIENT)
Dept: ORTHOPEDICS | Facility: CLINIC | Age: 37
End: 2017-09-28
Payer: MEDICAID

## 2017-09-28 DIAGNOSIS — S82.402A CLOSED FRACTURE OF SHAFT OF LEFT FIBULA, UNSPECIFIED FRACTURE MORPHOLOGY, INITIAL ENCOUNTER: ICD-10-CM

## 2017-09-28 DIAGNOSIS — M54.9 BACK PAIN, UNSPECIFIED BACK LOCATION, UNSPECIFIED BACK PAIN LATERALITY, UNSPECIFIED CHRONICITY: ICD-10-CM

## 2017-09-28 DIAGNOSIS — S82.402D TIBIA/FIBULA FRACTURE, LEFT, CLOSED, WITH ROUTINE HEALING, SUBSEQUENT ENCOUNTER: ICD-10-CM

## 2017-09-28 DIAGNOSIS — M25.552 LEFT HIP PAIN: ICD-10-CM

## 2017-09-28 DIAGNOSIS — Z01.818 PRE-OP TESTING: Primary | ICD-10-CM

## 2017-09-28 DIAGNOSIS — S93.402A MODERATE LEFT ANKLE SPRAIN, INITIAL ENCOUNTER: Primary | ICD-10-CM

## 2017-09-28 DIAGNOSIS — S82.202D TIBIA/FIBULA FRACTURE, LEFT, CLOSED, WITH ROUTINE HEALING, SUBSEQUENT ENCOUNTER: ICD-10-CM

## 2017-09-28 DIAGNOSIS — Z01.818 PRE-OP TESTING: ICD-10-CM

## 2017-09-28 PROCEDURE — 71020 XR CHEST PA AND LATERAL PRE-OP: CPT | Mod: TC,PO

## 2017-09-28 PROCEDURE — 73502 X-RAY EXAM HIP UNI 2-3 VIEWS: CPT | Mod: 26,LT,, | Performed by: RADIOLOGY

## 2017-09-28 PROCEDURE — 99212 OFFICE O/P EST SF 10 MIN: CPT | Mod: PBBFAC,25,PO | Performed by: ORTHOPAEDIC SURGERY

## 2017-09-28 PROCEDURE — 73502 X-RAY EXAM HIP UNI 2-3 VIEWS: CPT | Mod: TC,PO,LT

## 2017-09-28 PROCEDURE — 99999 PR PBB SHADOW E&M-EST. PATIENT-LVL II: CPT | Mod: PBBFAC,,, | Performed by: ORTHOPAEDIC SURGERY

## 2017-09-28 PROCEDURE — 99204 OFFICE O/P NEW MOD 45 MIN: CPT | Mod: S$PBB,,, | Performed by: ORTHOPAEDIC SURGERY

## 2017-09-28 PROCEDURE — 3008F BODY MASS INDEX DOCD: CPT | Mod: ,,, | Performed by: ORTHOPAEDIC SURGERY

## 2017-09-28 PROCEDURE — 72110 X-RAY EXAM L-2 SPINE 4/>VWS: CPT | Mod: TC,PO

## 2017-09-28 PROCEDURE — 72110 X-RAY EXAM L-2 SPINE 4/>VWS: CPT | Mod: 26,,, | Performed by: RADIOLOGY

## 2017-09-28 PROCEDURE — 71020 XR CHEST PA AND LATERAL PRE-OP: CPT | Mod: 26,,, | Performed by: RADIOLOGY

## 2017-09-28 RX ORDER — OXYCODONE AND ACETAMINOPHEN 10; 325 MG/1; MG/1
1 TABLET ORAL EVERY 4 HOURS PRN
Qty: 40 TABLET | Refills: 0 | Status: SHIPPED | OUTPATIENT
Start: 2017-09-28 | End: 2017-10-06

## 2017-09-28 NOTE — PRE ADMISSION SCREENING
Pre op instructions reviewed with patient per phone:    To confirm, Your surgeon has instructed you:  Surgery is scheduled 09/29/17 at 1300.      [facility] main lobby by 1130.      INSTRUCTIONS IMPORTANT!!!  ¨ Do not eat, drink, or smoke after 12 midnight, may have water and apple juice until 3h prior to sx. OK to brush teeth, no gum, candy or mints!    ¨ Take only these medicines with a small swallow of water-morning of surgery.  N/A      ____  Do not wear makeup, including mascara.  ____  No powder, lotions or creams to surgical area.  ____  Please remove all jewelry, including piercings and leave at home.  ____  No money or valuables needed. Please leave at home.  ____  Please bring identification and insurance information to hospital.  ____  If going home the same day, arrange for a ride home. You will not be able to   drive if Anesthesia was used.  ____  Children, under 12 years old, must remain in the waiting room with an adult.  They are not allowed in patient areas.  ____  Wear loose fitting clothing. Allow for dressings, bandages.  ____  Stop Aspirin, Ibuprofen, Motrin and Aleve at least 5-7 days before surgery, unless otherwise instructed by your doctor, or the nurse.   You MAY use Tylenol/acetaminophen until day of surgery.  ____  If you take diabetic medication, do not take am of surgery unless instructed by   Doctor.  ____ Stop taking any Fish Oil supplement or any Vitamins that contain Vitamin E at least 5 days prior to surgery.          Bathing Instructions-- The night before surgery and the morning prior to coming to the hospital:   -Do not shave the surgical area.   -Shower and wash your hair and body as usual with anti-bacterial  soap and shampoo.   -Rinse your hair and body completely.   -Use one packet of hibiclens to wash the surgical site (using your hand) gently for 5 minutes.  Do not scrub you skin too hard.   -Do not use hibiclens on your  head, face, or genitals.   -Do not wash with anti-bacterial soap after you use the hibiclens.   -Rinse your body thoroughly.   -Dry with clean, soft towel.  Do not use lotion, cream, deodorant, or powders on   the surgical site.    Use antibacterial soap in place of hibiclens if your surgery is on the head, face or genitals.         Surgical Site Infection    Prevention of surgical site infections:     -Keep incisions clean and dry.   -Do not soak/submerge incisions in water until completely healed.   -Do not apply lotions, powders, creams, or deodorants to site.   -Always make sure hands are cleaned with antibacterial soap/ alcohol-based   prior to touching the surgical site.  (This includes doctors, nurses, staff, and yourself.)    Signs and symptoms:   -Redness and pain around the area where you had surgery   -Drainage of cloudy fluid from your surgical wound   -Fever over 100.4  I have read or had read and explained to me, and understand the above information.

## 2017-09-29 ENCOUNTER — SURGERY (OUTPATIENT)
Age: 37
End: 2017-09-29

## 2017-09-29 ENCOUNTER — ANESTHESIA (OUTPATIENT)
Dept: SURGERY | Facility: HOSPITAL | Age: 37
End: 2017-09-29
Payer: MEDICAID

## 2017-09-29 ENCOUNTER — HOSPITAL ENCOUNTER (OUTPATIENT)
Facility: HOSPITAL | Age: 37
Discharge: HOME | End: 2017-09-29
Attending: ORTHOPAEDIC SURGERY | Admitting: ORTHOPAEDIC SURGERY
Payer: MEDICAID

## 2017-09-29 DIAGNOSIS — S82.402A CLOSED FRACTURE OF SHAFT OF LEFT FIBULA, UNSPECIFIED FRACTURE MORPHOLOGY, INITIAL ENCOUNTER: Primary | ICD-10-CM

## 2017-09-29 DIAGNOSIS — S82.892A CLOSED LEFT ANKLE FRACTURE: ICD-10-CM

## 2017-09-29 PROCEDURE — 63600175 PHARM REV CODE 636 W HCPCS: Performed by: ANESTHESIOLOGY

## 2017-09-29 PROCEDURE — 71000033 HC RECOVERY, INTIAL HOUR: Performed by: ORTHOPAEDIC SURGERY

## 2017-09-29 PROCEDURE — 27201423 OPTIME MED/SURG SUP & DEVICES STERILE SUPPLY: Performed by: ORTHOPAEDIC SURGERY

## 2017-09-29 PROCEDURE — 63600175 PHARM REV CODE 636 W HCPCS: Performed by: ORTHOPAEDIC SURGERY

## 2017-09-29 PROCEDURE — 25000003 PHARM REV CODE 250: Performed by: ORTHOPAEDIC SURGERY

## 2017-09-29 PROCEDURE — 76942 ECHO GUIDE FOR BIOPSY: CPT | Performed by: ANESTHESIOLOGY

## 2017-09-29 PROCEDURE — 25000003 PHARM REV CODE 250: Performed by: NURSE ANESTHETIST, CERTIFIED REGISTERED

## 2017-09-29 PROCEDURE — 36000708 HC OR TIME LEV III 1ST 15 MIN: Performed by: ORTHOPAEDIC SURGERY

## 2017-09-29 PROCEDURE — 37000008 HC ANESTHESIA 1ST 15 MINUTES: Performed by: ORTHOPAEDIC SURGERY

## 2017-09-29 PROCEDURE — 63600175 PHARM REV CODE 636 W HCPCS: Performed by: NURSE ANESTHETIST, CERTIFIED REGISTERED

## 2017-09-29 PROCEDURE — 27829 TREAT LOWER LEG JOINT: CPT | Mod: 51,LT,, | Performed by: ORTHOPAEDIC SURGERY

## 2017-09-29 PROCEDURE — 37000009 HC ANESTHESIA EA ADD 15 MINS: Performed by: ORTHOPAEDIC SURGERY

## 2017-09-29 PROCEDURE — 71000015 HC POSTOP RECOV 1ST HR: Performed by: ORTHOPAEDIC SURGERY

## 2017-09-29 PROCEDURE — 36000709 HC OR TIME LEV III EA ADD 15 MIN: Performed by: ORTHOPAEDIC SURGERY

## 2017-09-29 PROCEDURE — 25000003 PHARM REV CODE 250: Performed by: ANESTHESIOLOGY

## 2017-09-29 PROCEDURE — C1713 ANCHOR/SCREW BN/BN,TIS/BN: HCPCS | Performed by: ORTHOPAEDIC SURGERY

## 2017-09-29 PROCEDURE — 27822 TREATMENT OF ANKLE FRACTURE: CPT | Mod: LT,,, | Performed by: ORTHOPAEDIC SURGERY

## 2017-09-29 DEVICE — SCREW CANCELLOUS 4MMX 18MM: Type: IMPLANTABLE DEVICE | Site: ANKLE | Status: FUNCTIONAL

## 2017-09-29 DEVICE — SCREW CORTEX 3.5MM X 18MM: Type: IMPLANTABLE DEVICE | Site: ANKLE | Status: FUNCTIONAL

## 2017-09-29 DEVICE — SCREW CORTEX 3.5MM X 16MM: Type: IMPLANTABLE DEVICE | Site: ANKLE | Status: FUNCTIONAL

## 2017-09-29 DEVICE — SCREW CANCELLOUS FT 4MM X 16MM: Type: IMPLANTABLE DEVICE | Site: ANKLE | Status: FUNCTIONAL

## 2017-09-29 DEVICE — PLATE 8-HOLE LOCKING TUBULAR: Type: IMPLANTABLE DEVICE | Site: ANKLE | Status: FUNCTIONAL

## 2017-09-29 DEVICE — SCREW 4.5MM CRTX 54MM SLTP
Type: IMPLANTABLE DEVICE | Site: ANKLE | Status: NON-FUNCTIONAL
Removed: 2018-04-11

## 2017-09-29 DEVICE — SCREW CANCL 4.0MM 14MM: Type: IMPLANTABLE DEVICE | Site: ANKLE | Status: FUNCTIONAL

## 2017-09-29 DEVICE — SCREW CORTEX 3.5MM X 12MM: Type: IMPLANTABLE DEVICE | Site: ANKLE | Status: FUNCTIONAL

## 2017-09-29 RX ORDER — SODIUM CHLORIDE, SODIUM LACTATE, POTASSIUM CHLORIDE, CALCIUM CHLORIDE 600; 310; 30; 20 MG/100ML; MG/100ML; MG/100ML; MG/100ML
INJECTION, SOLUTION INTRAVENOUS CONTINUOUS
Status: DISCONTINUED | OUTPATIENT
Start: 2017-09-29 | End: 2017-09-29 | Stop reason: HOSPADM

## 2017-09-29 RX ORDER — CEFAZOLIN SODIUM 2 G/50ML
2 SOLUTION INTRAVENOUS
Status: DISCONTINUED | OUTPATIENT
Start: 2017-09-29 | End: 2017-09-29 | Stop reason: HOSPADM

## 2017-09-29 RX ORDER — ROCURONIUM BROMIDE 10 MG/ML
INJECTION, SOLUTION INTRAVENOUS
Status: DISCONTINUED | OUTPATIENT
Start: 2017-09-29 | End: 2017-09-29

## 2017-09-29 RX ORDER — GLYCOPYRROLATE 0.2 MG/ML
INJECTION INTRAMUSCULAR; INTRAVENOUS
Status: DISCONTINUED | OUTPATIENT
Start: 2017-09-29 | End: 2017-09-29

## 2017-09-29 RX ORDER — MORPHINE SULFATE 10 MG/ML
2 INJECTION INTRAMUSCULAR; INTRAVENOUS; SUBCUTANEOUS EVERY 5 MIN PRN
Status: DISCONTINUED | OUTPATIENT
Start: 2017-09-29 | End: 2017-09-29 | Stop reason: HOSPADM

## 2017-09-29 RX ORDER — ONDANSETRON 2 MG/ML
INJECTION INTRAMUSCULAR; INTRAVENOUS
Status: DISCONTINUED | OUTPATIENT
Start: 2017-09-29 | End: 2017-09-29

## 2017-09-29 RX ORDER — OXYCODONE AND ACETAMINOPHEN 10; 325 MG/1; MG/1
1 TABLET ORAL EVERY 4 HOURS PRN
Qty: 60 TABLET | Refills: 0 | Status: SHIPPED | OUTPATIENT
Start: 2017-09-29 | End: 2017-10-06

## 2017-09-29 RX ORDER — SUCCINYLCHOLINE CHLORIDE 20 MG/ML
INJECTION INTRAMUSCULAR; INTRAVENOUS
Status: DISCONTINUED | OUTPATIENT
Start: 2017-09-29 | End: 2017-09-29

## 2017-09-29 RX ORDER — OXYCODONE HYDROCHLORIDE 5 MG/1
5 TABLET ORAL
Status: DISCONTINUED | OUTPATIENT
Start: 2017-09-29 | End: 2017-09-29 | Stop reason: HOSPADM

## 2017-09-29 RX ORDER — ROPIVACAINE HYDROCHLORIDE 5 MG/ML
INJECTION, SOLUTION EPIDURAL; INFILTRATION; PERINEURAL
Status: DISCONTINUED | OUTPATIENT
Start: 2017-09-29 | End: 2017-09-29

## 2017-09-29 RX ORDER — SODIUM CHLORIDE 0.9 % (FLUSH) 0.9 %
3 SYRINGE (ML) INJECTION
Status: DISCONTINUED | OUTPATIENT
Start: 2017-09-29 | End: 2017-09-29 | Stop reason: HOSPADM

## 2017-09-29 RX ORDER — ROPIVACAINE HYDROCHLORIDE 5 MG/ML
INJECTION, SOLUTION EPIDURAL; INFILTRATION; PERINEURAL
Status: DISCONTINUED
Start: 2017-09-29 | End: 2017-09-29 | Stop reason: HOSPADM

## 2017-09-29 RX ORDER — SODIUM CHLORIDE 0.9 % (FLUSH) 0.9 %
3 SYRINGE (ML) INJECTION EVERY 8 HOURS
Status: DISCONTINUED | OUTPATIENT
Start: 2017-09-29 | End: 2017-09-29 | Stop reason: HOSPADM

## 2017-09-29 RX ORDER — MUPIROCIN 20 MG/G
1 OINTMENT TOPICAL
Status: DISCONTINUED | OUTPATIENT
Start: 2017-09-29 | End: 2017-09-29 | Stop reason: HOSPADM

## 2017-09-29 RX ORDER — KETOROLAC TROMETHAMINE 30 MG/ML
INJECTION, SOLUTION INTRAMUSCULAR; INTRAVENOUS
Status: DISCONTINUED | OUTPATIENT
Start: 2017-09-29 | End: 2017-09-29

## 2017-09-29 RX ORDER — HYDROMORPHONE HYDROCHLORIDE 2 MG/ML
0.2 INJECTION, SOLUTION INTRAMUSCULAR; INTRAVENOUS; SUBCUTANEOUS
Status: DISCONTINUED | OUTPATIENT
Start: 2017-09-29 | End: 2017-09-29 | Stop reason: HOSPADM

## 2017-09-29 RX ORDER — HYDROCODONE BITARTRATE AND ACETAMINOPHEN 5; 325 MG/1; MG/1
1 TABLET ORAL EVERY 4 HOURS PRN
Status: DISCONTINUED | OUTPATIENT
Start: 2017-09-29 | End: 2017-09-29 | Stop reason: HOSPADM

## 2017-09-29 RX ORDER — LIDOCAINE HYDROCHLORIDE 10 MG/ML
INJECTION, SOLUTION EPIDURAL; INFILTRATION; INTRACAUDAL; PERINEURAL
Status: DISCONTINUED | OUTPATIENT
Start: 2017-09-29 | End: 2017-09-29 | Stop reason: HOSPADM

## 2017-09-29 RX ORDER — MIDAZOLAM HYDROCHLORIDE 1 MG/ML
INJECTION, SOLUTION INTRAMUSCULAR; INTRAVENOUS
Status: DISCONTINUED | OUTPATIENT
Start: 2017-09-29 | End: 2017-09-29

## 2017-09-29 RX ORDER — PROPOFOL 10 MG/ML
VIAL (ML) INTRAVENOUS
Status: DISCONTINUED | OUTPATIENT
Start: 2017-09-29 | End: 2017-09-29

## 2017-09-29 RX ORDER — ACETAMINOPHEN 10 MG/ML
INJECTION, SOLUTION INTRAVENOUS
Status: DISCONTINUED | OUTPATIENT
Start: 2017-09-29 | End: 2017-09-29

## 2017-09-29 RX ORDER — NEOSTIGMINE METHYLSULFATE 1 MG/ML
INJECTION, SOLUTION INTRAVENOUS
Status: DISCONTINUED | OUTPATIENT
Start: 2017-09-29 | End: 2017-09-29

## 2017-09-29 RX ORDER — BUPIVACAINE HYDROCHLORIDE 2.5 MG/ML
INJECTION, SOLUTION EPIDURAL; INFILTRATION; INTRACAUDAL
Status: DISCONTINUED | OUTPATIENT
Start: 2017-09-29 | End: 2017-09-29 | Stop reason: HOSPADM

## 2017-09-29 RX ORDER — DEXAMETHASONE SODIUM PHOSPHATE 4 MG/ML
INJECTION, SOLUTION INTRA-ARTICULAR; INTRALESIONAL; INTRAMUSCULAR; INTRAVENOUS; SOFT TISSUE
Status: DISCONTINUED | OUTPATIENT
Start: 2017-09-29 | End: 2017-09-29

## 2017-09-29 RX ORDER — METOCLOPRAMIDE HYDROCHLORIDE 5 MG/ML
10 INJECTION INTRAMUSCULAR; INTRAVENOUS EVERY 10 MIN PRN
Status: DISCONTINUED | OUTPATIENT
Start: 2017-09-29 | End: 2017-09-29 | Stop reason: HOSPADM

## 2017-09-29 RX ORDER — HYDROCODONE BITARTRATE AND ACETAMINOPHEN 5; 325 MG/1; MG/1
1 TABLET ORAL EVERY 4 HOURS PRN
Status: DISCONTINUED | OUTPATIENT
Start: 2017-09-29 | End: 2017-09-29 | Stop reason: SDUPTHER

## 2017-09-29 RX ORDER — FENTANYL CITRATE 50 UG/ML
INJECTION, SOLUTION INTRAMUSCULAR; INTRAVENOUS
Status: DISCONTINUED | OUTPATIENT
Start: 2017-09-29 | End: 2017-09-29

## 2017-09-29 RX ORDER — MEPERIDINE HYDROCHLORIDE 50 MG/ML
12.5 INJECTION INTRAMUSCULAR; INTRAVENOUS; SUBCUTANEOUS ONCE AS NEEDED
Status: DISCONTINUED | OUTPATIENT
Start: 2017-09-29 | End: 2017-09-29 | Stop reason: HOSPADM

## 2017-09-29 RX ORDER — LIDOCAINE HCL/PF 100 MG/5ML
SYRINGE (ML) INTRAVENOUS
Status: DISCONTINUED | OUTPATIENT
Start: 2017-09-29 | End: 2017-09-29

## 2017-09-29 RX ADMIN — FENTANYL CITRATE 50 MCG: 50 INJECTION, SOLUTION INTRAMUSCULAR; INTRAVENOUS at 03:09

## 2017-09-29 RX ADMIN — MIDAZOLAM HYDROCHLORIDE 2 MG: 1 INJECTION, SOLUTION INTRAMUSCULAR; INTRAVENOUS at 01:09

## 2017-09-29 RX ADMIN — ROCURONIUM BROMIDE 20 MG: 10 INJECTION, SOLUTION INTRAVENOUS at 02:09

## 2017-09-29 RX ADMIN — FENTANYL CITRATE 100 MCG: 50 INJECTION, SOLUTION INTRAMUSCULAR; INTRAVENOUS at 02:09

## 2017-09-29 RX ADMIN — SUCCINYLCHOLINE CHLORIDE 140 MG: 20 INJECTION, SOLUTION INTRAMUSCULAR; INTRAVENOUS at 01:09

## 2017-09-29 RX ADMIN — ROBINUL 1 MG: 0.2 INJECTION INTRAMUSCULAR; INTRAVENOUS at 03:09

## 2017-09-29 RX ADMIN — MIDAZOLAM HYDROCHLORIDE 2 MG: 1 INJECTION, SOLUTION INTRAMUSCULAR; INTRAVENOUS at 02:09

## 2017-09-29 RX ADMIN — FENTANYL CITRATE 50 MCG: 50 INJECTION, SOLUTION INTRAMUSCULAR; INTRAVENOUS at 02:09

## 2017-09-29 RX ADMIN — PROPOFOL 50 MG: 10 INJECTION, EMULSION INTRAVENOUS at 03:09

## 2017-09-29 RX ADMIN — ROCURONIUM BROMIDE 5 MG: 10 INJECTION, SOLUTION INTRAVENOUS at 01:09

## 2017-09-29 RX ADMIN — DEXAMETHASONE SODIUM PHOSPHATE 8 MG: 4 INJECTION, SOLUTION INTRA-ARTICULAR; INTRALESIONAL; INTRAMUSCULAR; INTRAVENOUS; SOFT TISSUE at 02:09

## 2017-09-29 RX ADMIN — PROPOFOL 150 MG: 10 INJECTION, EMULSION INTRAVENOUS at 01:09

## 2017-09-29 RX ADMIN — PROPOFOL 50 MG: 10 INJECTION, EMULSION INTRAVENOUS at 02:09

## 2017-09-29 RX ADMIN — BUPIVACAINE HYDROCHLORIDE 10 ML: 2.5 INJECTION, SOLUTION EPIDURAL; INFILTRATION; INTRACAUDAL; PERINEURAL at 02:09

## 2017-09-29 RX ADMIN — SUCCINYLCHOLINE CHLORIDE 60 MG: 20 INJECTION, SOLUTION INTRAMUSCULAR; INTRAVENOUS at 01:09

## 2017-09-29 RX ADMIN — CEFAZOLIN SODIUM 2 G: 2 SOLUTION INTRAVENOUS at 02:09

## 2017-09-29 RX ADMIN — HYDROMORPHONE HYDROCHLORIDE 0.2 MG: 2 INJECTION, SOLUTION INTRAMUSCULAR; INTRAVENOUS; SUBCUTANEOUS at 12:09

## 2017-09-29 RX ADMIN — SODIUM CHLORIDE, SODIUM LACTATE, POTASSIUM CHLORIDE, AND CALCIUM CHLORIDE: 600; 310; 30; 20 INJECTION, SOLUTION INTRAVENOUS at 02:09

## 2017-09-29 RX ADMIN — SODIUM CHLORIDE, SODIUM LACTATE, POTASSIUM CHLORIDE, AND CALCIUM CHLORIDE: 600; 310; 30; 20 INJECTION, SOLUTION INTRAVENOUS at 12:09

## 2017-09-29 RX ADMIN — KETOROLAC TROMETHAMINE 30 MG: 30 INJECTION, SOLUTION INTRAMUSCULAR; INTRAVENOUS at 03:09

## 2017-09-29 RX ADMIN — FENTANYL CITRATE 100 MCG: 50 INJECTION, SOLUTION INTRAMUSCULAR; INTRAVENOUS at 01:09

## 2017-09-29 RX ADMIN — SODIUM CHLORIDE, SODIUM LACTATE, POTASSIUM CHLORIDE, AND CALCIUM CHLORIDE: 600; 310; 30; 20 INJECTION, SOLUTION INTRAVENOUS at 03:09

## 2017-09-29 RX ADMIN — NEOSTIGMINE METHYLSULFATE 5 MG: 1 INJECTION INTRAVENOUS at 03:09

## 2017-09-29 RX ADMIN — SODIUM CHLORIDE, SODIUM LACTATE, POTASSIUM CHLORIDE, AND CALCIUM CHLORIDE: 600; 310; 30; 20 INJECTION, SOLUTION INTRAVENOUS at 01:09

## 2017-09-29 RX ADMIN — ACETAMINOPHEN 1000 MG: 10 INJECTION, SOLUTION INTRAVENOUS at 03:09

## 2017-09-29 RX ADMIN — HYDROCODONE BITARTRATE AND ACETAMINOPHEN 1 TABLET: 5; 325 TABLET ORAL at 04:09

## 2017-09-29 RX ADMIN — LIDOCAINE HYDROCHLORIDE 10 ML: 10 INJECTION, SOLUTION EPIDURAL; INFILTRATION; INTRACAUDAL; PERINEURAL at 02:09

## 2017-09-29 RX ADMIN — LIDOCAINE HYDROCHLORIDE 80 MG: 20 INJECTION, SOLUTION INTRAVENOUS at 01:09

## 2017-09-29 RX ADMIN — ONDANSETRON 4 MG: 2 INJECTION, SOLUTION INTRAMUSCULAR; INTRAVENOUS at 03:09

## 2017-09-29 RX ADMIN — ROPIVACAINE HYDROCHLORIDE 30 ML: 5 INJECTION, SOLUTION EPIDURAL; INFILTRATION; PERINEURAL at 03:09

## 2017-09-29 RX ADMIN — ROCURONIUM BROMIDE 25 MG: 10 INJECTION, SOLUTION INTRAVENOUS at 02:09

## 2017-09-29 NOTE — PLAN OF CARE
1549-time out performed for nerve block. 1554-procedure started. 1558-procedure complete. Pt tolerated relatively well. Will cont to monitor.

## 2017-09-29 NOTE — PLAN OF CARE
Pt resting on stretcher s/p lt ankle ORIF performed under general anesthesia by Dr. Larsen. Respirations even and unlabored on room air and tolerating well with o2 sats of 97%. Neurovascular checks remain intact. VSS. See flow sheet for detailed assessment. Will cont to monitor.

## 2017-09-29 NOTE — OP NOTE
OPERATIVE DICTATION:    DATE OF SERVICE: 09/29/2017      Preoperative Diagnosis:  Left fibula fracture and syndesmosis disruption     Postoperative Diagnosis:   Left fibula fracture and syndesmosis disruption    Procedure: Open reduction and internal fixation of the fibula and screw fixation of the syndesmosis    Indication for surgery: Left fibula fracture and syndesmosis disruption    Anesthesia:  Gen. anesthesia    Complications: None     Surgeon: Lino Larsen M.D.     Specimen: None    Assistant:  RADAMES Augustin. His assistance was critical and necesssary with positioning of the extremity throughout the surgical procedure.The physician assistant allowed me to access areas of the left ankle  that could not be possible without the assistance of a trained orthopedic physician assistant.  His assistance was critical to allowing me to provide the highest level of care to the patient.      Operative procedure:  The patient brought to operating room after appropriate consent and placed under general anesthesia with intubation.  The timeout was performed and the correct extremity identified.  The left lower extremity was prepped with alcohol chlorhexidine and sterilely draped.  The Esmarch was used for exsanguination and tourniquet inflated to 300 mmHg pressure.  An incision was made lateral to the fibula extending 8 cm.  Dissection carried bluntly through subcutaneous tennis tissue down to the fracture site.  The patient noted to have a hematoma at the fracture site which was evacuated.  The bone reduction forceps applied and the fracture reduced.  The fluoroscopy was used throughout the procedure to visualize the fracture position and hardware position.  The 8 hole plate was contoured to the lateral aspect of the fibula.  7 appropriate length screws placed through the plate into the fibula.  The large bone reduction forceps applied and the ankle dorsiflexed to neutral the syndesmosis was closed.  A 55 mm 4.5  millimeters screw was placed across the syndesmosis.  The intraoperative x-ray showed good alignment of the fracture site as well as the hardware placement.  The wound irrigated quite thoroughly with normal saline.  The periosteum closed using a continuous #1 Vicryl suture.  The subcutaneous tissue opposed with #1 Vicryl suture.  The overlying skin opposed with staples.  Betadine applied to the incision site.  Sterile gauze applied padding applied and the patient placed in a posterior splint.  The patient tolerated procedure well and left operating room in good condition.                 Lino Larsen M.D.

## 2017-09-29 NOTE — ANESTHESIA POSTPROCEDURE EVALUATION
"Anesthesia Post Evaluation    Patient: Janice Kerr    Procedure(s) Performed: Procedure(s) (LRB):  OPEN REDUCTION INTERNAL FIXATION (ORIF)-FIBULA LEFT WITH SYNDEMOSIS SCREW FIXATION (Left)    Final Anesthesia Type: general  Patient location during evaluation: PACU  Patient participation: Yes- Able to Participate  Level of consciousness: awake and alert  Post-procedure vital signs: reviewed and not stable  Pain management: adequate  Airway patency: patent  PONV status at discharge: No PONV  Anesthetic complications: no      Cardiovascular status: blood pressure returned to baseline  Respiratory status: unassisted  Hydration status: euvolemic  Follow-up not needed.        Visit Vitals  BP (!) 145/85   Pulse 66   Temp 36.9 °C (98.4 °F) (Temporal)   Resp 15   Ht 5' 7" (1.702 m)   Wt 90.7 kg (200 lb)   LMP 02/02/2017   SpO2 97%   Breastfeeding? No   BMI 31.32 kg/m²       Pain/Garrett Score: Pain Assessment Performed: Yes (9/29/2017  4:22 PM)  Presence of Pain: non-verbal indicators absent (9/29/2017  4:22 PM)  Pain Rating Prior to Med Admin: 8 (9/29/2017  4:32 PM)  Garrett Score: 10 (9/29/2017  4:22 PM)      "

## 2017-09-29 NOTE — PATIENT INSTRUCTIONS

## 2017-09-29 NOTE — INTERVAL H&P NOTE
The patient has been examined and the H&P has been reviewed:    I concur with the findings and no changes have occurred since H&P was written.    Anesthesia/Surgery risks, benefits and alternative options discussed and understood by patient/family.          Active Hospital Problems    Diagnosis  POA    Closed left ankle fracture [S82.899E]  Yes      Resolved Hospital Problems    Diagnosis Date Resolved POA   No resolved problems to display.

## 2017-09-29 NOTE — ANESTHESIA PREPROCEDURE EVALUATION
09/29/2017  Janice Kerr is a 37 y.o., female.    Anesthesia Evaluation    I have reviewed the Patient Summary Reports.        Review of Systems  Anesthesia Hx:  Denies Family Hx of Anesthesia complications.   Denies Personal Hx of Anesthesia complications.   Cardiovascular:   Denies Hypertension.  Denies CAD.       Pulmonary:   Denies Pneumonia Denies COPD. Asthma    Renal/:   Denies Chronic Renal Disease.     Hepatic/GI:   Denies GERD.    Neurological:   Denies CVA. Denies Seizures.    Endocrine:   Denies Diabetes.        Physical Exam  General:  Well nourished    Airway/Jaw/Neck:  Airway Findings: Mouth Opening: Normal Tongue: Normal  General Airway Assessment: Adult  Mallampati: II       Chest/Lungs:  Chest/Lungs Findings: Clear to auscultation, Normal Respiratory Rate     Heart/Vascular:  Heart Findings: Rate: Normal  Rhythm: Regular Rhythm  Sounds: Normal             Anesthesia Plan  Type of Anesthesia, risks & benefits discussed:  Anesthesia Type:  general  Patient's Preference:   Intra-op Monitoring Plan:   Intra-op Monitoring Plan Comments:   Post Op Pain Control Plan: peripheral nerve block  Post Op Pain Control Plan Comments:   Induction:   IV  Beta Blocker:  Patient is not currently on a Beta-Blocker (No further documentation required).       Informed Consent: Patient understands risks and agrees with Anesthesia plan.  Questions answered.   ASA Score: 2     Day of Surgery Review of History & Physical:            Ready For Surgery From Anesthesia Perspective.

## 2017-09-29 NOTE — DISCHARGE INSTRUCTIONS
General Information:    1.  Do not drink alcoholic beverages including beer for 24 hours or as long as you are on pain medication..  2.  Do not drive a motor vehicle, operate machinery or power tools, or signs legal papers for 24 hours or as long as you are on pain medication.   3.  You may experience light-headedness, dizziness, and sleepiness following surgery. Please do not stay alone. A responsible adult should be with you for this 24 hour period.  4.  Go home and rest.    5. Progress slowly to a normal diet unless instructed.  Otherwise, begin with liquids such as soft drinks, then soup and crackers working up to solid foods. Drink plenty of nonalcoholic fluids.  6.  Certain anesthetics and pain medications produce nausea and vomiting in certain       individuals. If nausea becomes a problem at home, call you doctor.    7. Several times every hour while you are awake, take 2-3 deep breaths and cough. If you had stomach surgery hold a pillow or rolled towel firmly against your stomach before you cough. This will help with any pain the cough might cause.    8. Several times every hour while you are awake, pump and flex your feet 5-6 times and do foot circles. This will help prevent blood clots.    9.Call your doctor for severe pain, bleeding, fever, or signs or symptoms of infection (pain, swelling, redness, foul odor, drainage).    10.You can contact your doctor anytime by [phone] for the [facility] (at [facility]) or [phone] for the [facility] on [facility].   [email or web address] is another way to contact your doctor if you are an active participant online with [facility].

## 2017-09-29 NOTE — TRANSFER OF CARE
"Anesthesia Transfer of Care Note    Patient: Janice Kerr    Procedure(s) Performed: Procedure(s) (LRB):  OPEN REDUCTION INTERNAL FIXATION (ORIF)-FIBULA LEFT WITH SYNDEMOSIS SCREW FIXATION (Left)    Patient location: PACU    Anesthesia Type: general    Transport from OR: Transported from OR on room air with adequate spontaneous ventilation    Post pain: adequate analgesia    Post assessment: no apparent anesthetic complications    Post vital signs: stable    Level of consciousness: awake    Nausea/Vomiting: no nausea/vomiting    Complications: none    Transfer of care protocol was followed      Last vitals:   Visit Vitals  /78 (BP Location: Right arm, Patient Position: Sitting)   Pulse 87   Temp 37.1 °C (98.7 °F) (Oral)   Resp 18   Ht 5' 7" (1.702 m)   Wt 90.7 kg (200 lb)   LMP 02/02/2017   SpO2 97%   Breastfeeding? No   BMI 31.32 kg/m²     "

## 2017-09-29 NOTE — ANESTHESIA PROCEDURE NOTES
Peripheral    Patient location during procedure: pre-op   Block not for primary anesthetic.  Reason for block: at surgeon's request and post-op pain management   Post-op Pain Location: s/p left ankle fx  Start time: 9/29/2017 3:54 PM  Timeout: 9/29/2017 3:49 PM   End time: 9/29/2017 3:58 PM  Surgery related to: ankle surgery  Staffing  Anesthesiologist: JOSUÉ SHARPE  Performed: anesthesiologist   Preanesthetic Checklist  Completed: patient identified, surgical consent, pre-op evaluation, timeout performed, IV checked, risks and benefits discussed and monitors and equipment checked  Peripheral Block  Patient position: sitting  Prep: ChloraPrep  Patient monitoring: heart rate, cardiac monitor, continuous pulse ox, continuous capnometry and frequent blood pressure checks  Block type: popliteal  Laterality: left  Injection technique: single shot  Needle  Needle type: Stimuplex   Needle gauge: 21 G  Needle length: 4 in  Needle localization: anatomical landmarks, nerve stimulator and ultrasound guidance   -ultrasound image captured on disc.  Assessment  Injection assessment: negative aspiration  Paresthesia pain: immediately resolved  Heart rate change: no  Slow fractionated injection: no  Medications:  Bolus administered: 30 mL of 0.5 ropivacaine  Epinephrine added: 3.75 mcg/mL (1/300,000)  Additional Notes  VSS.  [facility] RN monitoring vitals throughout procedure.  Patient tolerated procedure well.

## 2017-09-29 NOTE — BRIEF OP NOTE
[facility]  Brief Operative Note     SUMMARY     Surgery Date: 9/29/2017     Surgeon(s) and Role:     * Lino Larsen Sr., MD - Primary    Assisting Surgeon: None    Pre-op Diagnosis:  Tibia/fibula fracture, left, closed, with routine healing, subsequent encounter [S82.202D, S82.402D], syndesmosis disruption    Post-op Diagnosis:  Post-Op Diagnosis Codes:     * Tibia/fibula fracture, left, closed, with routine healing, subsequent encounter [S82.202D, S82.402D], syndesmosis disruption    Procedure(s) (LRB):  OPEN REDUCTION INTERNAL FIXATION (ORIF)-FIBULA LEFT WITH SYNDEMOSIS SCREW FIXATION (Left)    Anesthesia: General    Description of the findings of the procedure: left fibula fracture     Findings/Key Components: left fibula fracture     Estimated Blood Loss: 5 cc         Specimens:   Specimen (12h ago through future)    None          Discharge Note    SUMMARY     Admit Date: 9/29/2017    Discharge Date and Time:  09/29/2017     Hospital Course (synopsis of major diagnoses, care, treatment, and services provided during the course of the hospital stay): without complications     Final Diagnosis: Post-Op Diagnosis Codes:     * Tibia/fibula fracture, left, closed, with routine healing, subsequent encounter [S82.202D, S82.402D]    Disposition: Home or Self Care    Follow Up/Patient Instructions:  Crutch assisted ambulation NWB left lower extremity. Resume regular diet, NWB left lower extremity.    Medications:Percocet   Reconciled Home Medications:   Current Discharge Medication List      START taking these medications    Details   !! oxycodone-acetaminophen (PERCOCET)  mg per tablet Take 1 tablet by mouth every 4 (four) hours as needed.  Qty: 60 tablet, Refills: 0       !! - Potential duplicate medications found. Please discuss with provider.      CONTINUE these medications which have NOT CHANGED    Details   albuterol 90 mcg/actuation inhaler Inhale 2 puffs into the lungs every 4 (four) hours  "as needed for Wheezing.  Qty: 1 Inhaler, Refills: 0      !! oxycodone-acetaminophen (PERCOCET)  mg per tablet Take 1 tablet by mouth every 4 (four) hours as needed.  Qty: 40 tablet, Refills: 0      cyclobenzaprine (FLEXERIL) 10 MG tablet Take 0.5 tablets (5 mg total) by mouth 3 (three) times daily as needed for Muscle spasms.  Qty: 15 tablet, Refills: 0      medroxyPROGESTERone (DEPO-PROVERA) 150 mg/mL injection Inject 150 mg into the muscle every 3 (three) months.      omeprazole (PRILOSEC) 20 MG capsule Take 1 capsule (20 mg total) by mouth once daily.  Qty: 30 capsule, Refills: 0       !! - Potential duplicate medications found. Please discuss with provider.      STOP taking these medications       cephALEXin (KEFLEX) 500 MG capsule Comments:   Reason for Stopping:         diclofenac (VOLTAREN) 50 MG EC tablet Comments:   Reason for Stopping:         meloxicam (MOBIC) 15 MG tablet Comments:   Reason for Stopping:               Discharge Procedure Orders  CRUTCHES FOR HOME USE   Order Specific Question Answer Comments   Type: Axillary    Height: 5' 7" (1.702 m)    Weight: 90.7 kg (200 lb)    Length of need (1-99 months): 3      Diet general     Call MD for:  temperature >100.4     Call MD for:  persistent nausea and vomiting     Call MD for:  severe uncontrolled pain     Call MD for:  difficulty breathing, headache or visual disturbances     Call MD for:  redness, tenderness, or signs of infection (pain, swelling, redness, odor or green/yellow discharge around incision site)     Call MD for:  hives     Call MD for:  persistent dizziness or light-headedness     Call MD for:  extreme fatigue     Non weight bearing   Order Comments: Elevate the  Left ankle to 5 cm above the level of the heart     Leave dressing on - Keep it clean, dry, and intact until clinic visit       Follow-up Information     Call to follow up.    Why:  As needed, If symptoms worsen, For suture removal, For wound re-check               "

## 2017-09-29 NOTE — PROGRESS NOTES
CC:This is a 37 year old female that complains of left ankle pain.     HPI:She sustained a fracture to the left ankle.  The pain is rated as 10 out of 10.    PMH:    Past Medical History:   Diagnosis Date    Asthma        PSH:    Past Surgical History:   Procedure Laterality Date    BREAST LUMPECTOMY Right     HYSTERECTOMY      TUBAL LIGATION         Family Hx:    Family History   Problem Relation Age of Onset    Heart disease Mother     Hypertension Mother     Diabetes Mother        Allergy:  Review of patient's allergies indicates:  No Known Allergies    Medication:    Current Outpatient Prescriptions:     albuterol 90 mcg/actuation inhaler, Inhale 2 puffs into the lungs every 4 (four) hours as needed for Wheezing., Disp: 1 Inhaler, Rfl: 0    cephALEXin (KEFLEX) 500 MG capsule, Take 500 mg by mouth 4 (four) times daily., Disp: , Rfl:     cyclobenzaprine (FLEXERIL) 10 MG tablet, Take 0.5 tablets (5 mg total) by mouth 3 (three) times daily as needed for Muscle spasms., Disp: 15 tablet, Rfl: 0    diclofenac (VOLTAREN) 50 MG EC tablet, Take 1 tablet (50 mg total) by mouth 3 (three) times daily as needed., Disp: 14 tablet, Rfl: 0    medroxyPROGESTERone (DEPO-PROVERA) 150 mg/mL injection, Inject 150 mg into the muscle every 3 (three) months., Disp: , Rfl:     meloxicam (MOBIC) 15 MG tablet, Take 1 tablet (15 mg total) by mouth once daily. Take with breakfast, Disp: 30 tablet, Rfl: 0    naproxen (NAPROSYN) 375 MG tablet, Take 1 tablet (375 mg total) by mouth 2 (two) times daily with meals., Disp: 30 tablet, Rfl: 0    omeprazole (PRILOSEC) 20 MG capsule, Take 1 capsule (20 mg total) by mouth once daily., Disp: 30 capsule, Rfl: 0    oxycodone-acetaminophen (PERCOCET)  mg per tablet, Take 1 tablet by mouth every 4 (four) hours as needed., Disp: 40 tablet, Rfl: 0    Social History:    Social History     Social History    Marital status: Legally      Spouse name: N/A    Number of children: N/A     Years of education: N/A     Occupational History    Not on file.     Social History Main Topics    Smoking status: Current Some Day Smoker     Packs/day: 2.00     Types: Cigarettes    Smokeless tobacco: Never Used      Comment: no smoking after m.n prior to sx    Alcohol use No    Drug use: No    Sexual activity: Yes     Partners: Male     Other Topics Concern    Not on file     Social History Narrative    No narrative on file       Vitals:   LMP 02/02/2017      ROS:  GENERAL: No fever, chills, fatigability or weight loss.  SKIN: No rashes, itching or changes in color or texture of skin.  HEAD: No headaches or recent head trauma.  EYES: Visual acuity fine. No photophobia, ocular pain or diplopia.  EARS: Denies ear pain, discharge or vertigo.  NOSE: No loss of smell, no epistaxis or postnasal drip.  MOUTH & THROAT: No hoarseness or change in voice. No excessive gum bleeding.  NODES: Denies swollen glands.  CHEST: Denies ARIZA, cyanosis, wheezing, cough and sputum production.  CARDIOVASCULAR: Denies chest pain, PND, orthopnea or reduced exercise tolerance.  ABDOMEN: Appetite fine. No weight loss. Denies diarrhea, abdominal pain, hematemesis or blood in stool.  URINARY: No flank pain, dysuria or hematuria.  PERIPHERAL VASCULAR: No claudication or cyanosis.  NEUROLOGIC: No history of seizures, paralysis, alteration of gait or coordination.  MUSCULOSKELETAL: See HPI    PE:  APPEARANCE: Well nourished, well developed, in no acute distress.   HEAD: Normocephalic, atraumatic.  EYES: PERRL. EOMI.   EARS: TM's intact. Light reflex normal. No retraction or perforation.   NOSE: Mucosa pink. Airway clear.  MOUTH & THROAT: No tonsillar enlargement. No pharyngeal erythema or exudate. No stridor.  NECK: Supple.   NODES: No cervical, axillary or inguinal lymph node enlargement.  CHEST: Lungs clear to auscultation.  CARDIOVASCULAR: Normal S1, S2. No rubs, murmurs or gallops.  ABDOMEN: Bowel sounds normal. Not distended.  Soft. No tenderness or masses.  NEUROLOGIC: Cranial Nerves: II-XII grossly intact, also see MUSCULOSKELETAL  MUSCULOSKELETAL:             Left  Ankle - 2 plus dorsalis pedis and posterior tibial artery pulses, light touch intact Left lower extremity.  All digits are warm. No erythema, no warmth, no drainage, no swelling, significant tenderness over the deltoid ligament and fibula.  Less than 2 seconds capillary refill all digits.         Assessment:           Diagnosis:              1.left fibula fracture and deltoid ligament stran                   Diagnostic Studies  MRI-No  X-Ray-No  EMG/NCV-No  Arthrogram-No  Bone Scan-No  CT Scan-No  Doppler-No  ESR-No  CRP-No  CBC with Diff-No   Rheumatoid/Arthritis Panel-No      Plan:                                                 1. PT-no                                                 2.OT-no                                          3.NSAID-no                                        4. Narcotics-yes                                     5. Wound care-N/A                                 6. Rest-yes                                           7. Surgery-yes , orif of the left ankle                                      8. CHARLES Hose-no                                    9. Anticoagulation therapy-yes               10. Elevation-no                                     11. Crutches-no                                    12. Walker-no             13. Cane no                        14. Referral-no                                     15.Injection-no                            16. Splint   /    Cast   /   Cast Shoe- yes              17. RICE            18. Follow up-

## 2017-09-30 ENCOUNTER — NURSE TRIAGE (OUTPATIENT)
Dept: ADMINISTRATIVE | Facility: CLINIC | Age: 37
End: 2017-09-30

## 2017-09-30 RX ORDER — ORPHENADRINE CITRATE 100 MG/1
100 TABLET, EXTENDED RELEASE ORAL 2 TIMES DAILY PRN
Qty: 60 TABLET | Refills: 0 | Status: SHIPPED | OUTPATIENT
Start: 2017-09-30 | End: 2018-01-09

## 2017-09-30 RX ORDER — METAXALONE 800 MG/1
800 TABLET ORAL 4 TIMES DAILY PRN
Qty: 60 TABLET | Refills: 2 | Status: SHIPPED | OUTPATIENT
Start: 2017-09-30 | End: 2017-09-30

## 2017-09-30 NOTE — TELEPHONE ENCOUNTER
"    Reason for Disposition   [1] Request for URGENT new prescription or refill of "essential" medication (i.e., likelihood of harm to patient if not taken) AND [2] triager unable to fill per unit policy    Protocols used: ST MEDICATION QUESTION CALL-A-    Patient complains of left calf pain and wants to use heat instead of ice. Complaint of muscle spasms in the left ankle. Call conference done with Dr. Salguero. Dr. Salguero spoke with the patient at length and ordered medications. Patient verbalized understanding.   "

## 2017-09-30 NOTE — TELEPHONE ENCOUNTER
Reason for Disposition   Caller has URGENT medication question about med that PCP prescribed and triager unable to answer question    Protocols used: ST MEDICATION QUESTION CALL-A-AH    Pt calling in regards to medication (Skelaxin) prescribed today is not covered under her insurance.  Pt wanted to know if she could be prescribed a generic version of same med.  Paged and discussed with On Call (VIVIAN Salguero MD); MD stated medication prescribed is generic; Pt could try to re-wrap or take 2 tabs of Percocet every 4 to 6 hours; If not Pt may have to go to ED for further evaluation.  Called and discussed with Pt.  Pt verbalized understanding.

## 2017-09-30 NOTE — TELEPHONE ENCOUNTER
Please let the patient know that their spasm  medication was e- scribed in to     [facility] [address].    Thank you,    Dr. Salguero

## 2017-09-30 NOTE — TELEPHONE ENCOUNTER
Please let the patient know that a different spasm medicine, Norflex,  was e- scribed in to [facility] on [address].    Thank you,    Dr. Salguero

## 2017-10-04 ENCOUNTER — TELEPHONE (OUTPATIENT)
Dept: ORTHOPEDICS | Facility: CLINIC | Age: 37
End: 2017-10-04

## 2017-10-04 VITALS
HEIGHT: 67 IN | DIASTOLIC BLOOD PRESSURE: 85 MMHG | TEMPERATURE: 98 F | SYSTOLIC BLOOD PRESSURE: 145 MMHG | OXYGEN SATURATION: 97 % | RESPIRATION RATE: 15 BRPM | BODY MASS INDEX: 31.39 KG/M2 | HEART RATE: 66 BPM | WEIGHT: 200 LBS

## 2017-10-04 RX ORDER — DIAZEPAM 5 MG/1
5 TABLET ORAL EVERY 6 HOURS PRN
Qty: 10 TABLET | Refills: 0 | Status: SHIPPED | OUTPATIENT
Start: 2017-10-04 | End: 2018-01-09

## 2017-10-04 NOTE — TELEPHONE ENCOUNTER
----- Message from Rosa Thibodeaux sent at 10/4/2017  1:45 PM CDT -----  Contact: self [phone]  States that the medication percocet 10mg is not helping with the leg spasms. States that she would like something prescribed that will help the leg spasms. Please call back at [phone]//thank you acc

## 2017-10-04 NOTE — PROGRESS NOTES
Post Op Call done spoke with patient via phone pt states pain is not contriolled at this time rates pain as 8-10 tp left fibula s/p surgery on 9/29/2017. Pt states her great toe keeps jumping and some tingling,swelling  But states circulation is good when pt compared reggie lower extremities states pt.States she called in for more pain meds a Dr Salguero ordered her something for pain but her insurance would not pay for it.Pt states has been elevating leg and applying ice as ordered. Pt states taking Percocet and Neurontin as ordered per MD. Dr Larsen informed of pts condition at this time.

## 2017-10-06 ENCOUNTER — OFFICE VISIT (OUTPATIENT)
Dept: ORTHOPEDICS | Facility: CLINIC | Age: 37
End: 2017-10-06
Payer: MEDICAID

## 2017-10-06 ENCOUNTER — TELEPHONE (OUTPATIENT)
Dept: ORTHOPEDICS | Facility: CLINIC | Age: 37
End: 2017-10-06

## 2017-10-06 VITALS
HEIGHT: 67 IN | DIASTOLIC BLOOD PRESSURE: 82 MMHG | SYSTOLIC BLOOD PRESSURE: 120 MMHG | BODY MASS INDEX: 31.38 KG/M2 | WEIGHT: 199.94 LBS | HEART RATE: 110 BPM | TEMPERATURE: 98 F

## 2017-10-06 DIAGNOSIS — Z98.890 POST-OPERATIVE STATE: Primary | ICD-10-CM

## 2017-10-06 PROCEDURE — 99024 POSTOP FOLLOW-UP VISIT: CPT | Mod: ,,, | Performed by: ORTHOPAEDIC SURGERY

## 2017-10-06 PROCEDURE — 99999 PR PBB SHADOW E&M-EST. PATIENT-LVL III: CPT | Mod: PBBFAC,,, | Performed by: ORTHOPAEDIC SURGERY

## 2017-10-06 PROCEDURE — 99213 OFFICE O/P EST LOW 20 MIN: CPT | Mod: PBBFAC,PO | Performed by: ORTHOPAEDIC SURGERY

## 2017-10-06 RX ORDER — HYDROCODONE BITARTRATE AND ACETAMINOPHEN 10; 325 MG/1; MG/1
1 TABLET ORAL EVERY 4 HOURS PRN
Qty: 40 TABLET | Refills: 0 | Status: SHIPPED | OUTPATIENT
Start: 2017-10-06 | End: 2017-10-16

## 2017-10-06 NOTE — PROGRESS NOTES
"SUBJECTIVE:  Patient is status post Left ankle ORIF.  The patient states that her toes shiver, curl up and shake.  The patient appears very comfortable.    Patient complains of  10/10 pain that is better with the  pain meds and aggravated with movement.            Past Medical History:   Diagnosis Date    Asthma      Past Surgical History:   Procedure Laterality Date    BREAST LUMPECTOMY Right     HYSTERECTOMY      TUBAL LIGATION       Review of patient's allergies indicates:  No Known Allergies  Current Outpatient Prescriptions on File Prior to Visit   Medication Sig Dispense Refill    diazePAM (VALIUM) 5 MG tablet Take 1 tablet (5 mg total) by mouth every 6 (six) hours as needed for Anxiety. 10 tablet 0    medroxyPROGESTERone (DEPO-PROVERA) 150 mg/mL injection Inject 150 mg into the muscle every 3 (three) months.      orphenadrine (NORFLEX) 100 mg tablet Take 1 tablet (100 mg total) by mouth 2 (two) times daily as needed for Muscle spasms. 60 tablet 0    [DISCONTINUED] oxycodone-acetaminophen (PERCOCET)  mg per tablet Take 1 tablet by mouth every 4 (four) hours as needed. 40 tablet 0    [DISCONTINUED] oxycodone-acetaminophen (PERCOCET)  mg per tablet Take 1 tablet by mouth every 4 (four) hours as needed. 60 tablet 0    albuterol 90 mcg/actuation inhaler Inhale 2 puffs into the lungs every 4 (four) hours as needed for Wheezing. 1 Inhaler 0    omeprazole (PRILOSEC) 20 MG capsule Take 1 capsule (20 mg total) by mouth once daily. 30 capsule 0     No current facility-administered medications on file prior to visit.      /82   Pulse 110   Temp 97.8 °F (36.6 °C)   Ht 5' 7" (1.702 m)   Wt 90.7 kg (199 lb 15.3 oz)   LMP 02/02/2017   BMI 31.32 kg/m²    ROS:  GENERAL: No fever, chills, fatigability or weight loss.  SKIN: No rashes, itching or changes in color or texture of skin.  HEAD: No headaches or recent head trauma.  EYES: Visual acuity fine. No photophobia, ocular pain or " diplopia.  EARS: Denies ear pain, discharge or vertigo.  NOSE: No loss of smell, no epistaxis or postnasal drip.  MOUTH & THROAT: No hoarseness or change in voice. No excessive gum bleeding.  NODES: Denies swollen glands.  CHEST: Denies ARIZA, cyanosis, wheezing, cough and sputum production.  CARDIOVASCULAR: Denies chest pain, PND, orthopnea or reduced exercise tolerance.  ABDOMEN: Appetite fine. No weight loss. Denies diarrhea, abdominal pain, hematemesis or blood in stool.  URINARY: No flank pain, dysuria or hematuria.  PERIPHERAL VASCULAR: No claudication or cyanosis.  NEUROLOGIC: No history of seizures, paralysis, alteration of gait or coordination.  MUSCULOSKELETAL: See HPI    PE:  APPEARANCE: Well nourished, well developed, in no acute distress.   HEAD: Normocephalic, atraumatic.  EYES: PERRL. EOMI.   EARS: TM's intact. Light reflex normal. No retraction or perforation.   NOSE: Mucosa pink. Airway clear.  MOUTH & THROAT: No tonsillar enlargement. No pharyngeal erythema or exudate. No stridor.  NECK: Supple.   NODES: No cervical, axillary or inguinal lymph node enlargement.  CHEST: Lungs clear to auscultation.  CARDIOVASCULAR: Normal S1, S2. No rubs, murmurs or gallops.  ABDOMEN: Bowel sounds normal. Not distended. Soft. No tenderness or masses.  NEUROLOGIC: Cranial Nerves: II-XII grossly intact, also see MUSCULOSKELETAL  MUSCULOSKELETAL:        Left ankle -dressing intact, 2 plus dorsalis pedis and posterior tibial artery pulses, light touch intact Left lower extremity.  All digits are warm. No erythema, no warmth, no drainage, mild swelling, mildtenderness.  Less than 2 seconds capillary refill all digits.      ASSESSMENT:  The patient remained histrionic throughout the visit.  Her complaints do not coincide with her physical findings.  The patient is stable and improving.      PLAN:  Norco-The patient understands that I will refer her to pain management should she need more narcotic medication.  Continue pain  medication  Continue wound care  Continue physical therapy

## 2017-10-06 NOTE — TELEPHONE ENCOUNTER
----- Message from Matilda Ling sent at 10/6/2017  9:11 AM CDT -----  Contact: Patient   1. What is the name of the medication you are requesting? Rx Percocet  2. What is the dose? 10   3. How do you take the medication? Orally, topically, etc? oral  4. How often do you take this medication? One every 4 hours as needed  5. Do you need a 30 day or 90 day supply? N/A  6. How many refills are you requesting? N/A  7. What is your preferred pharmacy and location of the pharmacy?   [facility] [id number] [facility] [place] - [address] AT [facility], [place]  [address]  [place] [address]  Phone: [phone] Fax: [phone]  8. Who can we contact with further questions? Patient/[phone]

## 2017-10-06 NOTE — PATIENT INSTRUCTIONS
Leg Fracture    You have a break (fracture) of the leg. A fracture is treated with a splint, cast, or special boot. It will usually take at about 8 to 12 weeks for the fracture to heal, but it can take several months in some cases. If you have a severe injury, you may need surgery to fix it.  Home care  Follow these guidelines when caring for yourself at home:  · You will be given a splint, cast, boot, or other device to keep the injured area from moving. Unless you were told otherwise, use crutches or a walker. Dont put weight on the injured leg until your healthcare provider says you can do so. (You can rent crutches and a walker at many pharmacies and surgical or orthopedic supply stores.)  · Keep your leg elevated to reduce pain and swelling. When sleeping, put a pillow under the injured leg. When sitting, support the injured leg so it is above your waist. This is very important during the first 2 days (48 hours).  · Put an ice pack on the injured area. Do this for 20 minutes every 1 to 2 hours the first day for pain relief. You can make an ice pack by wrapping a plastic bag of ice cubes in a thin towel. As the ice melts, be careful that the cast, splint, or boot doesnt get wet. You can put the ice pack directly over the splint or cast. Continue using the ice pack 3 to 4 times a day for the next 2 days. Then use the ice pack as needed to ease pain and swelling.  · Keep the cast, splint, or boot completely dry at all times. Bathe with your cast, splint, or boot out of the water. Protect it with a large plastic bag, rubber-banded at the top end. If a boot or fiberglass cast or splint gets wet, you can dry it with a hair dryer.  · You may use acetaminophen or ibuprofen to control pain, unless another pain medicine was prescribed. If you have chronic liver or kidney disease, talk with your healthcare provider before using these medicines. Also talk with your provider if youve had a stomach ulcer or  gastrointestinal bleeding.  · Dont put creams or objects under the cast if you have itching.  Follow-up care  Follow up with your healthcare provider, or as advised. This is to make sure the bone is healing the way it should. If a splint was put on, it may be converted to a cast at your next visit.  X-rays may be taken. You will be told of any new findings that may affect your care.  When to seek medical advice  Call your healthcare provider right away if any of these occur:  · The cast or splint cracks  · The plaster cast or splint becomes wet or soft  · The fiberglass cast or splint stays wet for more than 24 hours  · Bad odor from the cast or wound fluid stains the cast  · Tightness or pain under the cast or splint gets worse  · Toes become swollen, cold, blue, numb, or tingly  · You cant move your toes  · Skin around cast or splint becomes red  · Fever of 100.4ºF (38ºC) or higher, or as directed by your healthcare provider  Date Last Reviewed: 2/1/2017 © 2000-2017 [email or web address]. [address] [place] [address]. All rights reserved. This information is not intended as a substitute for professional medical care. Always follow your healthcare professional's instructions.

## 2017-10-11 DIAGNOSIS — M25.572 ACUTE LEFT ANKLE PAIN: Primary | ICD-10-CM

## 2017-10-12 ENCOUNTER — TELEPHONE (OUTPATIENT)
Dept: ORTHOPEDICS | Facility: CLINIC | Age: 37
End: 2017-10-12

## 2017-10-12 ENCOUNTER — OFFICE VISIT (OUTPATIENT)
Dept: ORTHOPEDICS | Facility: CLINIC | Age: 37
End: 2017-10-12
Payer: MEDICAID

## 2017-10-12 ENCOUNTER — HOSPITAL ENCOUNTER (OUTPATIENT)
Dept: RADIOLOGY | Facility: HOSPITAL | Age: 37
Discharge: HOME | End: 2017-10-12
Attending: ORTHOPAEDIC SURGERY
Payer: MEDICAID

## 2017-10-12 VITALS
BODY MASS INDEX: 31.38 KG/M2 | SYSTOLIC BLOOD PRESSURE: 125 MMHG | RESPIRATION RATE: 18 BRPM | WEIGHT: 199.94 LBS | DIASTOLIC BLOOD PRESSURE: 85 MMHG | HEIGHT: 67 IN | HEART RATE: 106 BPM

## 2017-10-12 DIAGNOSIS — Z98.890 POSTOPERATIVE STATE: Primary | ICD-10-CM

## 2017-10-12 DIAGNOSIS — M25.572 ACUTE LEFT ANKLE PAIN: ICD-10-CM

## 2017-10-12 PROCEDURE — 99024 POSTOP FOLLOW-UP VISIT: CPT | Mod: ,,, | Performed by: ORTHOPAEDIC SURGERY

## 2017-10-12 PROCEDURE — 99999 PR PBB SHADOW E&M-EST. PATIENT-LVL III: CPT | Mod: PBBFAC,,, | Performed by: ORTHOPAEDIC SURGERY

## 2017-10-12 PROCEDURE — 73610 X-RAY EXAM OF ANKLE: CPT | Mod: 26,LT,, | Performed by: RADIOLOGY

## 2017-10-12 PROCEDURE — 73610 X-RAY EXAM OF ANKLE: CPT | Mod: TC,PO,LT

## 2017-10-12 PROCEDURE — 99213 OFFICE O/P EST LOW 20 MIN: CPT | Mod: PBBFAC,25,PO | Performed by: ORTHOPAEDIC SURGERY

## 2017-10-12 RX ORDER — OXYCODONE AND ACETAMINOPHEN 5; 325 MG/1; MG/1
TABLET ORAL
Refills: 0 | COMMUNITY
Start: 2017-09-26 | End: 2017-10-12

## 2017-10-12 RX ORDER — MOMETASONE FUROATE AND FORMOTEROL FUMARATE DIHYDRATE 100; 5 UG/1; UG/1
AEROSOL RESPIRATORY (INHALATION)
Refills: 1 | COMMUNITY
Start: 2017-08-16 | End: 2018-06-28

## 2017-10-12 RX ORDER — FLUTICASONE PROPIONATE 50 MCG
SPRAY, SUSPENSION (ML) NASAL
Refills: 1 | COMMUNITY
Start: 2017-08-16 | End: 2018-06-28

## 2017-10-12 RX ORDER — NAPROXEN 500 MG/1
TABLET ORAL
Refills: 0 | Status: ON HOLD | COMMUNITY
Start: 2017-07-08 | End: 2018-04-11 | Stop reason: HOSPADM

## 2017-10-12 RX ORDER — GABAPENTIN 300 MG/1
CAPSULE ORAL
Refills: 2 | COMMUNITY
Start: 2017-09-26 | End: 2018-01-09

## 2017-10-12 RX ORDER — SODIUM BICARBONATE 650 MG/1
TABLET ORAL
Refills: 3 | COMMUNITY
Start: 2017-08-16 | End: 2018-01-09

## 2017-10-12 RX ORDER — HYDROCODONE BITARTRATE AND ACETAMINOPHEN 10; 325 MG/1; MG/1
1 TABLET ORAL EVERY 4 HOURS PRN
Qty: 28 TABLET | Refills: 0 | Status: SHIPPED | OUTPATIENT
Start: 2017-10-12 | End: 2017-10-22

## 2017-10-12 NOTE — PROGRESS NOTES
"SUBJECTIVE:  Patient is status post Left ankle ORIF.  The patient states that her toes shiver, curl up and shake.  The patient appears very comfortable.    Patient complains of  10/10 pain that is better with the  pain meds and aggravated with movement.            Past Medical History:   Diagnosis Date    Asthma      Past Surgical History:   Procedure Laterality Date    BREAST LUMPECTOMY Right     HYSTERECTOMY      TUBAL LIGATION       Review of patient's allergies indicates:  No Known Allergies  Current Outpatient Prescriptions on File Prior to Visit   Medication Sig Dispense Refill    diazePAM (VALIUM) 5 MG tablet Take 1 tablet (5 mg total) by mouth every 6 (six) hours as needed for Anxiety. 10 tablet 0    hydrocodone-acetaminophen 10-325mg (NORCO)  mg Tab Take 1 tablet by mouth every 4 (four) hours as needed. 40 tablet 0    medroxyPROGESTERone (DEPO-PROVERA) 150 mg/mL injection Inject 150 mg into the muscle every 3 (three) months.      orphenadrine (NORFLEX) 100 mg tablet Take 1 tablet (100 mg total) by mouth 2 (two) times daily as needed for Muscle spasms. 60 tablet 0    albuterol 90 mcg/actuation inhaler Inhale 2 puffs into the lungs every 4 (four) hours as needed for Wheezing. 1 Inhaler 0    omeprazole (PRILOSEC) 20 MG capsule Take 1 capsule (20 mg total) by mouth once daily. 30 capsule 0     No current facility-administered medications on file prior to visit.      /85   Pulse 106   Resp 18   Ht 5' 7" (1.702 m)   Wt 90.7 kg (199 lb 15.3 oz)   LMP 02/02/2017   BMI 31.32 kg/m²    ROS:  GENERAL: No fever, chills, fatigability or weight loss.  SKIN: No rashes, itching or changes in color or texture of skin.  HEAD: No headaches or recent head trauma.  EYES: Visual acuity fine. No photophobia, ocular pain or diplopia.  EARS: Denies ear pain, discharge or vertigo.  NOSE: No loss of smell, no epistaxis or postnasal drip.  MOUTH & THROAT: No hoarseness or change in voice. No excessive gum " bleeding.  NODES: Denies swollen glands.  CHEST: Denies ARIZA, cyanosis, wheezing, cough and sputum production.  CARDIOVASCULAR: Denies chest pain, PND, orthopnea or reduced exercise tolerance.  ABDOMEN: Appetite fine. No weight loss. Denies diarrhea, abdominal pain, hematemesis or blood in stool.  URINARY: No flank pain, dysuria or hematuria.  PERIPHERAL VASCULAR: No claudication or cyanosis.  NEUROLOGIC: No history of seizures, paralysis, alteration of gait or coordination.  MUSCULOSKELETAL: See HPI    PE:  APPEARANCE: Well nourished, well developed, in no acute distress.   HEAD: Normocephalic, atraumatic.  EYES: PERRL. EOMI.   EARS: TM's intact. Light reflex normal. No retraction or perforation.   NOSE: Mucosa pink. Airway clear.  MOUTH & THROAT: No tonsillar enlargement. No pharyngeal erythema or exudate. No stridor.  NECK: Supple.   NODES: No cervical, axillary or inguinal lymph node enlargement.  CHEST: Lungs clear to auscultation.  CARDIOVASCULAR: Normal S1, S2. No rubs, murmurs or gallops.  ABDOMEN: Bowel sounds normal. Not distended. Soft. No tenderness or masses.  NEUROLOGIC: Cranial Nerves: II-XII grossly intact, also see MUSCULOSKELETAL  MUSCULOSKELETAL:        Left ankle -dressing intact, 2 plus dorsalis pedis and posterior tibial artery pulses, light touch intact Left lower extremity.  All digits are warm. No erythema, no warmth, no drainage, mild swelling, mildtenderness.  Less than 2 seconds capillary refill all digits.      ASSESSMENT:  The patient remained histrionic throughout the visit.  Her complaints do not coincide with her physical findings.  The patient is stable and improving.      PLAN:  Norco-The patient understands that I will refer her to pain management should she need more narcotic medication.  Continue pain medication  Continue wound care  Continue physical therapy  Staples removed  rutch assisted ambulation nonweight bear left lower extremity

## 2017-10-12 NOTE — PATIENT INSTRUCTIONS
ACE Wrap  Minor muscle or joint injuries are often treated with an elastic bandage. The bandage provides support and compression to the injured area. An elastic bandage is a stretchy, rolled bandage. Elastic bandages range in width from 2 to 6 inches. They can be used for a variety of injuries. The bandages are often called ACE bandages, after the most common brand name.  If used correctly, elastic bandages help control swelling and ease pain. An elastic bandage is also a good reminder not to overuse the injured area. However, elastic bandages do not provide a lot of support and will not prevent reinjury.  Home care    To apply an elastic bandage:  · Check the skin before wrapping the injury. It should be clean, dry, and free of drainage.  · Start wrapping below the injury and work your way toward the body. For an ankle sprain, start wrapping around the foot and work up toward the calf. This will help control swelling.  · Overlap the edges of the bandage so it stays snuggly in place.  · Wrap the bandage firmly, but not too tightly. A tight bandage can increase swelling on either end of the bandage. Make sure the bandage is wrinkle free.  · Leave fingers and toes exposed.  · Secure ends of the bandage (even self-sticking ones) with clips or tape.  · Check frequently to ensure adequate circulation, especially in the fingers and toes. Loosen the bandage if there is local swelling, numbness, tingling, discomfort, coldness, or discoloration (skin pale or bluish in color).  · Rewrap the bandage as needed during the day. Reroll the bandage as you unwind it.  Continue using the elastic bandage until the pain and swelling are gone or as your healthcare provider advises.  If you have been told to ice the area, the ice can be secured in place with the elastic bandage. Wrap the ice pack with a thin towel to protect the skin. Do not put ice or an ice pack directly on the skin.  Ice the area for no more than 20 minutes at a  time.    Follow-up care  Follow up with your healthcare provider, as advised.  When to seek medical advice  Call your healthcare provider for any of the following:  · Pain and swelling that doesn't get better or gets worse  · Trouble moving injured area  · Skin discoloration, numbness, or tingling that doesnt go away after bandage is removed  Date Last Reviewed: 9/13/2015  © 9412-4517 [employer]. [address] [place] [address], [place] [address]. All rights reserved. This information is not intended as a substitute for professional medical care. Always follow your healthcare professional's instructions.

## 2017-11-17 ENCOUNTER — TELEPHONE (OUTPATIENT)
Dept: ORTHOPEDICS | Facility: CLINIC | Age: 37
End: 2017-11-17

## 2017-11-17 NOTE — TELEPHONE ENCOUNTER
The daughter of the patient came and picked up letter for the patient stating she has been out of work due to her surgery. -AS

## 2017-11-20 ENCOUNTER — TELEPHONE (OUTPATIENT)
Dept: ORTHOPEDICS | Facility: CLINIC | Age: 37
End: 2017-11-20

## 2017-11-20 DIAGNOSIS — M89.8X6 PAIN OF LEFT FIBULA: Primary | ICD-10-CM

## 2017-11-20 NOTE — TELEPHONE ENCOUNTER
----- Message from Abdiazizroberto Randolph sent at 11/20/2017  3:02 PM CST -----  Contact: [phone]   Pt would like to consult with the nurse about foot pain and would like something called in for pain.  Please call back at [phone]. Thx-        Pt Uses:    [facility] [address] - [place]  [address]  [address] [facility]  [place] [address]  Phone: [phone] Fax: [phone]

## 2017-11-20 NOTE — TELEPHONE ENCOUNTER
Returned pt phone call. Pt states she fell and would like to be seen. Scheduled pt for 11/21 at 2:30p. Pt vocalized understanding

## 2017-11-22 DIAGNOSIS — M89.8X6 PAIN OF LEFT FIBULA: Primary | ICD-10-CM

## 2017-11-28 ENCOUNTER — OFFICE VISIT (OUTPATIENT)
Dept: ORTHOPEDICS | Facility: CLINIC | Age: 37
End: 2017-11-28
Payer: MEDICAID

## 2017-11-28 ENCOUNTER — HOSPITAL ENCOUNTER (OUTPATIENT)
Dept: RADIOLOGY | Facility: HOSPITAL | Age: 37
Discharge: HOME | End: 2017-11-28
Attending: ORTHOPAEDIC SURGERY
Payer: MEDICAID

## 2017-11-28 VITALS
HEART RATE: 44 BPM | WEIGHT: 199.94 LBS | RESPIRATION RATE: 19 BRPM | DIASTOLIC BLOOD PRESSURE: 74 MMHG | SYSTOLIC BLOOD PRESSURE: 106 MMHG | TEMPERATURE: 99 F | HEIGHT: 67 IN | BODY MASS INDEX: 31.38 KG/M2

## 2017-11-28 DIAGNOSIS — M89.8X6 PAIN OF LEFT FIBULA: ICD-10-CM

## 2017-11-28 DIAGNOSIS — Z98.890 POSTOPERATIVE STATE: Primary | ICD-10-CM

## 2017-11-28 PROCEDURE — 99024 POSTOP FOLLOW-UP VISIT: CPT | Mod: ,,, | Performed by: PHYSICIAN ASSISTANT

## 2017-11-28 PROCEDURE — 73590 X-RAY EXAM OF LOWER LEG: CPT | Mod: 26,LT,, | Performed by: RADIOLOGY

## 2017-11-28 PROCEDURE — 99214 OFFICE O/P EST MOD 30 MIN: CPT | Mod: PBBFAC,25,PO | Performed by: PHYSICIAN ASSISTANT

## 2017-11-28 PROCEDURE — 73590 X-RAY EXAM OF LOWER LEG: CPT | Mod: TC,PO,LT

## 2017-11-28 PROCEDURE — 99999 PR PBB SHADOW E&M-EST. PATIENT-LVL IV: CPT | Mod: PBBFAC,,, | Performed by: PHYSICIAN ASSISTANT

## 2017-11-28 RX ORDER — ACETAMINOPHEN 500 MG
500 TABLET ORAL EVERY 6 HOURS PRN
COMMUNITY

## 2017-11-28 RX ORDER — CYCLOBENZAPRINE HCL 10 MG
10 TABLET ORAL NIGHTLY
Qty: 10 TABLET | Refills: 0 | Status: SHIPPED | OUTPATIENT
Start: 2017-11-28 | End: 2017-12-08

## 2017-11-28 NOTE — PATIENT INSTRUCTIONS
Ankle Fracture, Distal Fibula  You have a fracture, or broken bone, of the end of the fibula bone. The fibula is one of two bones that support the ankle joint.    Home care  · You will be given a splint, cast, or special boot to prevent movement at the injury site. Do not put weight on a splint. It will break. Follow your healthcare providers advice about when to begin bearing weight on a cast or boot.  · Keep your leg elevated when sitting or lying down. When sleeping, place a pillow under the injured leg. When sitting, support the injured leg so it is level with your waist. This is very important during the first 48 hours.  · Keep the cast or splint completely dry at all times. When bathing, protect the cast or splint with 2 large plastic bags. Place 1 bag outside of the other. Tape each bag with duct tape at the top end. Water can still leak in even when the foot is covered. So it's best to keep the cast, splint, or boot away from water. If a fiberglass cast or splint gets wet, dry it with a hair dryer on a cool setting.  · Place an ice pack over the injured area for no more than 15 to 20 minutes. Do this every 3 to 6 hours for the first 24 to 48 hours. Continue this 3 to 4 times a day as needed. To make an ice pack, put ice cubes in a plastic bag that seals at the top. Wrap the bag in a clean, thin towel or cloth. Never put ice or an ice pack directly on the skin. The ice pack can be put right on the cast or splint. As the ice melts, be careful that the cast or splint doesnt get wet.  · You may use over-the-counter pain medicine to control pain, unless another pain medicine was prescribed. If you have chronic liver or kidney disease or ever had a stomach ulcer or GI bleeding, talk with your provider before using these medicines.  Follow-up care  Follow up with your healthcare provider in 1 week, or as advised. This is to be sure the bone is healing properly. If you were given a splint, it may be changed to a  cast after the swelling goes down.  If X-rays were taken, you will be told of any new findings that may affect your care.  When to seek medical advice  Call your healthcare provider right away if any of these occur:  · The plaster cast or splint becomes wet or soft  · The fiberglass cast or splint stays wet for more than 24 hours  · There is increased tightness or pain under the cast or splint  · Your toes become swollen, cold, blue, numb, or tingly  · The cast becomes loose  · The cast has a bad smell  · Sore areas develop under the cast  · The cast develops cracks or breaks   Date Last Reviewed: 11/23/2015  © 8144-3128 [employer]. [address] [place] [address], [place] [address]. All rights reserved. This information is not intended as a substitute for professional medical care. Always follow your healthcare professional's instructions.

## 2017-11-29 NOTE — PROGRESS NOTES
"SUBJECTIVE:  Patient is status post Left ankle ORIF.    Date of surgery: 09/29/2017    The patient appears very comfortable.    Patient complains of  5/10 pain that is better with the  pain meds and aggravated with movement. Pain increase at night    Past Medical History:   Diagnosis Date    Asthma      Past Surgical History:   Procedure Laterality Date    BREAST LUMPECTOMY Right     HYSTERECTOMY      TUBAL LIGATION       Review of patient's allergies indicates:  No Known Allergies  Current Outpatient Prescriptions on File Prior to Visit   Medication Sig Dispense Refill    gabapentin (NEURONTIN) 300 MG capsule TK ONE C PO  Q 8 H  2    albuterol 90 mcg/actuation inhaler Inhale 2 puffs into the lungs every 4 (four) hours as needed for Wheezing. 1 Inhaler 0    diazePAM (VALIUM) 5 MG tablet Take 1 tablet (5 mg total) by mouth every 6 (six) hours as needed for Anxiety. 10 tablet 0    DULERA 100-5 mcg/actuation HFAA INHALE 1 PUFF PO BID  1    fluticasone (FLONASE) 50 mcg/actuation nasal spray USE 1 SPRAY IN EACH NOSTRIL BID. RINSE MOUTH AFTER EACH USE  1    medroxyPROGESTERone (DEPO-PROVERA) 150 mg/mL injection Inject 150 mg into the muscle every 3 (three) months.      naproxen (NAPROSYN) 500 MG tablet TK 1 T PO  BID X 7 DAYS  0    omeprazole (PRILOSEC) 20 MG capsule Take 1 capsule (20 mg total) by mouth once daily. 30 capsule 0    orphenadrine (NORFLEX) 100 mg tablet Take 1 tablet (100 mg total) by mouth 2 (two) times daily as needed for Muscle spasms. 60 tablet 0    sodium bicarbonate 650 MG tablet TK 1 T PO  BID WITH MEALS  3     No current facility-administered medications on file prior to visit.      /74   Pulse (!) 44   Temp 99 °F (37.2 °C) (Oral)   Resp 19   Ht 5' 7" (1.702 m)   Wt 90.7 kg (199 lb 15.3 oz)   LMP 02/02/2017   BMI 31.32 kg/m²    ROS:  GENERAL: No fever, chills, fatigability or weight loss.  SKIN: No rashes, itching or changes in color or texture of skin.  HEAD: No headaches " or recent head trauma.  EYES: Visual acuity fine. No photophobia, ocular pain or diplopia.  EARS: Denies ear pain, discharge or vertigo.  NOSE: No loss of smell, no epistaxis or postnasal drip.  MOUTH & THROAT: No hoarseness or change in voice. No excessive gum bleeding.  NODES: Denies swollen glands.  CHEST: Denies ARIZA, cyanosis, wheezing, cough and sputum production.  CARDIOVASCULAR: Denies chest pain, PND, orthopnea or reduced exercise tolerance.  ABDOMEN: Appetite fine. No weight loss. Denies diarrhea, abdominal pain, hematemesis or blood in stool.  URINARY: No flank pain, dysuria or hematuria.  PERIPHERAL VASCULAR: No claudication or cyanosis.  NEUROLOGIC: No history of seizures, paralysis, alteration of gait or coordination.  MUSCULOSKELETAL: See HPI    PE:  APPEARANCE: Well nourished, well developed, in no acute distress.   HEAD: Normocephalic, atraumatic.  EYES: PERRL. EOMI.   EARS: TM's intact. Light reflex normal. No retraction or perforation.   NOSE: Mucosa pink. Airway clear.  MOUTH & THROAT: No tonsillar enlargement. No pharyngeal erythema or exudate. No stridor.  NECK: Supple.   NODES: No cervical, axillary or inguinal lymph node enlargement.  CHEST: Lungs clear to auscultation.  CARDIOVASCULAR: Normal S1, S2. No rubs, murmurs or gallops.  ABDOMEN: Bowel sounds normal. Not distended. Soft. No tenderness or masses.  NEUROLOGIC: Cranial Nerves: II-XII grossly intact, also see MUSCULOSKELETAL  MUSCULOSKELETAL:        Left ankle - 2 plus dorsalis pedis and posterior tibial artery pulses, light touch intact Left lower extremity.  All digits are warm. No erythema, no warmth, no drainage, mild swelling, mildtenderness.  Less than 2 seconds capillary refill all digits.      ASSESSMENT:  The patient is stable and improving.      PLAN:  Norco-none given.  Flexeril 10 QHS # 15 no refills  Increase ROM  Continue physical therapy    crutch assisted ambulation toe touch ambulation left lower extremity

## 2018-01-09 ENCOUNTER — OFFICE VISIT (OUTPATIENT)
Dept: ORTHOPEDICS | Facility: CLINIC | Age: 38
End: 2018-01-09
Payer: MEDICAID

## 2018-01-09 ENCOUNTER — HOSPITAL ENCOUNTER (OUTPATIENT)
Dept: RADIOLOGY | Facility: HOSPITAL | Age: 38
Discharge: HOME | End: 2018-01-09
Attending: ORTHOPAEDIC SURGERY
Payer: MEDICAID

## 2018-01-09 VITALS
HEART RATE: 80 BPM | WEIGHT: 199.94 LBS | SYSTOLIC BLOOD PRESSURE: 116 MMHG | HEIGHT: 67 IN | DIASTOLIC BLOOD PRESSURE: 75 MMHG | BODY MASS INDEX: 31.38 KG/M2

## 2018-01-09 DIAGNOSIS — M89.8X6 PAIN OF LEFT FIBULA: ICD-10-CM

## 2018-01-09 DIAGNOSIS — Z87.81 HISTORY OF FRACTURE OF LEFT ANKLE: Primary | ICD-10-CM

## 2018-01-09 DIAGNOSIS — M79.662 PAIN OF LEFT LOWER LEG: Primary | ICD-10-CM

## 2018-01-09 PROCEDURE — 73590 X-RAY EXAM OF LOWER LEG: CPT | Mod: 26,LT,, | Performed by: RADIOLOGY

## 2018-01-09 PROCEDURE — 99213 OFFICE O/P EST LOW 20 MIN: CPT | Mod: PBBFAC,25,PO | Performed by: PHYSICIAN ASSISTANT

## 2018-01-09 PROCEDURE — 99999 PR PBB SHADOW E&M-EST. PATIENT-LVL III: CPT | Mod: PBBFAC,,, | Performed by: PHYSICIAN ASSISTANT

## 2018-01-09 PROCEDURE — 99213 OFFICE O/P EST LOW 20 MIN: CPT | Mod: S$PBB,,, | Performed by: PHYSICIAN ASSISTANT

## 2018-01-09 PROCEDURE — 73590 X-RAY EXAM OF LOWER LEG: CPT | Mod: TC,FY,PO,LT

## 2018-01-09 NOTE — LETTER
January 10, 2018      Logan Luke Jr., MD  [address] [place] [address] [address] [place] [id number]           [facility]  [address] [facility] [place] [address]  Phone: [phone]  Fax: [phone]          Patient: Jancie Kerr   MR Number: [id number]   YOB: 1980   Date of Visit: 1/9/2018       Dear Dr. Logan Luke Jr.:    Thank you for referring Janice Kerr to me for evaluation. Attached you will find relevant portions of my assessment and plan of care.    If you have questions, please do not hesitate to call me. I look forward to following Janice Kerr along with you.    Sincerely,    Emmanuel Cohen PA-C    Enclosure  CC:  No Recipients    If you would like to receive this communication electronically, please contact [email or web address] or [phone] to request more information on [employer] access.    For providers and/or their staff who would like to refer a patient to [facility], please contact us through our one-stop-shop provider referral line, [facility], at [phone].    If you feel you have received this communication in error or would no longer like to receive these types of communications, please e-mail [email or web address]

## 2018-01-09 NOTE — LETTER
January 9, 2018      [facility]  [address] [facility] [place] [address]  Phone: [phone]  Fax: [phone]       Patient: Janice Kerr   YOB: 1980  Date of Visit: 01/09/2018    To Whom It May Concern:    Victoriano Kerr  was at [facility] on 01/09/2018. She may return to work/school on 10 Jd 2018 with no restrictions. If you have any questions or concerns, or if I can be of further assistance, please do not hesitate to contact me.    Sincerely,      Emmanuel Cohen PA-C

## 2018-01-10 NOTE — PROGRESS NOTES
"SUBJECTIVE:  Patient is status post Left ankle ORIF.    Date of surgery: 09/29/2017    The patient appears very comfortable.    Patient complains of  5/10 pain that is better with the  pain meds and aggravated with movement. Pain increase at night    Past Medical History:   Diagnosis Date    Asthma      Past Surgical History:   Procedure Laterality Date    BREAST LUMPECTOMY Right     HYSTERECTOMY      TUBAL LIGATION       Review of patient's allergies indicates:  No Known Allergies  Current Outpatient Prescriptions on File Prior to Visit   Medication Sig Dispense Refill    acetaminophen (TYLENOL) 500 MG tablet Take 500 mg by mouth every 6 (six) hours as needed for Pain.      albuterol 90 mcg/actuation inhaler Inhale 2 puffs into the lungs every 4 (four) hours as needed for Wheezing. 1 Inhaler 0    DULERA 100-5 mcg/actuation HFAA INHALE 1 PUFF PO BID  1    fluticasone (FLONASE) 50 mcg/actuation nasal spray USE 1 SPRAY IN EACH NOSTRIL BID. RINSE MOUTH AFTER EACH USE  1    medroxyPROGESTERone (DEPO-PROVERA) 150 mg/mL injection Inject 150 mg into the muscle every 3 (three) months.      naproxen (NAPROSYN) 500 MG tablet TK 1 T PO  BID X 7 DAYS  0     No current facility-administered medications on file prior to visit.      /75 (BP Location: Right arm, Patient Position: Sitting, BP Method: Medium (Automatic))   Pulse 80   Ht 5' 7" (1.702 m)   Wt 90.7 kg (199 lb 15.3 oz)   LMP 02/02/2017   BMI 31.32 kg/m²    ROS:  GENERAL: No fever, chills, fatigability or weight loss.  SKIN: No rashes, itching or changes in color or texture of skin.  HEAD: No headaches or recent head trauma.  EYES: Visual acuity fine. No photophobia, ocular pain or diplopia.  EARS: Denies ear pain, discharge or vertigo.  NOSE: No loss of smell, no epistaxis or postnasal drip.  MOUTH & THROAT: No hoarseness or change in voice. No excessive gum bleeding.  NODES: Denies swollen glands.  CHEST: Denies ARIZA, cyanosis, wheezing, cough and " sputum production.  CARDIOVASCULAR: Denies chest pain, PND, orthopnea or reduced exercise tolerance.  ABDOMEN: Appetite fine. No weight loss. Denies diarrhea, abdominal pain, hematemesis or blood in stool.  URINARY: No flank pain, dysuria or hematuria.  PERIPHERAL VASCULAR: No claudication or cyanosis.  NEUROLOGIC: No history of seizures, paralysis, alteration of gait or coordination.  MUSCULOSKELETAL: See HPI    PE:  APPEARANCE: Well nourished, well developed, in no acute distress.   HEAD: Normocephalic, atraumatic.  EYES: PERRL. EOMI.   EARS: TM's intact. Light reflex normal. No retraction or perforation.   NOSE: Mucosa pink. Airway clear.  MOUTH & THROAT: No tonsillar enlargement. No pharyngeal erythema or exudate. No stridor.  NECK: Supple.   NODES: No cervical, axillary or inguinal lymph node enlargement.  CHEST: Lungs clear to auscultation.  CARDIOVASCULAR: Normal S1, S2. No rubs, murmurs or gallops.  ABDOMEN: Bowel sounds normal. Not distended. Soft. No tenderness or masses.  NEUROLOGIC: Cranial Nerves: II-XII grossly intact, also see MUSCULOSKELETAL  MUSCULOSKELETAL:        Left ankle - 2 plus dorsalis pedis and posterior tibial artery pulses, light touch intact Left lower extremity.  All digits are warm. No erythema, no warmth, no drainage, mild swelling, mildtenderness.  Less than 2 seconds capillary refill all digits.      ASSESSMENT:  The patient is stable and improving.      PLAN:  Norco-none given.  Increase ROM  Continue physical therapy  Return to work full duties  Follow up in 6 weeks for re-x-rays

## 2018-03-01 ENCOUNTER — HOSPITAL ENCOUNTER (OUTPATIENT)
Dept: RADIOLOGY | Facility: HOSPITAL | Age: 38
Discharge: HOME | End: 2018-03-01
Attending: PHYSICIAN ASSISTANT
Payer: MEDICAID

## 2018-03-01 ENCOUNTER — OFFICE VISIT (OUTPATIENT)
Dept: ORTHOPEDICS | Facility: CLINIC | Age: 38
End: 2018-03-01
Payer: MEDICAID

## 2018-03-01 VITALS
HEIGHT: 67 IN | WEIGHT: 199.94 LBS | HEART RATE: 77 BPM | DIASTOLIC BLOOD PRESSURE: 87 MMHG | SYSTOLIC BLOOD PRESSURE: 111 MMHG | BODY MASS INDEX: 31.38 KG/M2

## 2018-03-01 DIAGNOSIS — M79.662 PAIN OF LEFT LOWER LEG: ICD-10-CM

## 2018-03-01 DIAGNOSIS — M25.572 ACUTE LEFT ANKLE PAIN: Primary | ICD-10-CM

## 2018-03-01 PROCEDURE — 99999 PR PBB SHADOW E&M-EST. PATIENT-LVL III: CPT | Mod: PBBFAC,,, | Performed by: ORTHOPAEDIC SURGERY

## 2018-03-01 PROCEDURE — 99214 OFFICE O/P EST MOD 30 MIN: CPT | Mod: S$PBB,,, | Performed by: ORTHOPAEDIC SURGERY

## 2018-03-01 PROCEDURE — 73590 X-RAY EXAM OF LOWER LEG: CPT | Mod: 26,LT,, | Performed by: RADIOLOGY

## 2018-03-01 PROCEDURE — 73590 X-RAY EXAM OF LOWER LEG: CPT | Mod: TC,FY,PO,LT

## 2018-03-01 PROCEDURE — 99213 OFFICE O/P EST LOW 20 MIN: CPT | Mod: PBBFAC,25,PO | Performed by: ORTHOPAEDIC SURGERY

## 2018-03-01 RX ORDER — PROMETHAZINE HYDROCHLORIDE AND DEXTROMETHORPHAN HYDROBROMIDE 6.25; 15 MG/5ML; MG/5ML
SYRUP ORAL
Refills: 0 | COMMUNITY
Start: 2018-02-05 | End: 2018-06-28

## 2018-03-01 NOTE — PATIENT INSTRUCTIONS
Arthralgia    Arthralgia is the term for pain in or around the joint. It is a symptom, not a disease. This pain may involve one or more joints. In some cases, the pain moves from joint to joint.  There are many causes for joint pain. These include:  · Injury  · Osteoarthritis (wearing out of the joint surface)  · Gout (inflammation of the joint due to crystals in the joint fluid)  · Infection inside the joint    · Bursitis (inflammation of the fluid-filled sacs around the joint)  · Autoimmune disorders such as rheumatoid arthritis or lupus  · Tendonitis (inflammation of chords that attach muscle to bone)  Home care  · Rest the involved joint(s) until your symptoms improve.   · You may be prescribed pain medicine. If none is prescribed, you may use acetaminophen or ibuprofen to control pain and inflammation.  Follow-up care  Follow up with your healthcare provider or as advised.  When to seek medical advice  Contact your healthcare provider right away if any of the following occurs:  · Pain, swelling, or redness of joint increases  · Pain worsens or recurs after a period of improvement  · Pain moves to other joints  · You cannot bear weight on the affected joint   · You cannot move the affected joint  · Joint appears deformed  · New rash appears  · Fever of 100.4ºF (38ºC) or higher, or as directed by your healthcare provider  Date Last Reviewed: 3/1/2017  © 9370-5520 [employer]. [address] [place] [address], [place] [address]. All rights reserved. This information is not intended as a substitute for professional medical care. Always follow your healthcare professional's instructions.

## 2018-03-01 NOTE — PROGRESS NOTES
"SUBJECTIVE:  Patient is status post Left ankle ORIF.    Date of surgery: 09/29/2017    The patient appears very comfortable.    Patient complains of  5/10 pain that is better with the  pain meds and aggravated with movement. Pain increase at night    Past Medical History:   Diagnosis Date    Asthma      Past Surgical History:   Procedure Laterality Date    BREAST LUMPECTOMY Right     HYSTERECTOMY      TUBAL LIGATION       Review of patient's allergies indicates:  No Known Allergies  Current Outpatient Prescriptions on File Prior to Visit   Medication Sig Dispense Refill    acetaminophen (TYLENOL) 500 MG tablet Take 500 mg by mouth every 6 (six) hours as needed for Pain.      albuterol 90 mcg/actuation inhaler Inhale 2 puffs into the lungs every 4 (four) hours as needed for Wheezing. 1 Inhaler 0    DULERA 100-5 mcg/actuation HFAA INHALE 1 PUFF PO BID  1    fluticasone (FLONASE) 50 mcg/actuation nasal spray USE 1 SPRAY IN EACH NOSTRIL BID. RINSE MOUTH AFTER EACH USE  1    medroxyPROGESTERone (DEPO-PROVERA) 150 mg/mL injection Inject 150 mg into the muscle every 3 (three) months.      naproxen (NAPROSYN) 500 MG tablet TK 1 T PO  BID X 7 DAYS  0     No current facility-administered medications on file prior to visit.      /87   Pulse 77   Ht 5' 7" (1.702 m)   Wt 90.7 kg (199 lb 15.3 oz)   LMP 02/02/2017   BMI 31.32 kg/m²    ROS:  GENERAL: No fever, chills, fatigability or weight loss.  SKIN: No rashes, itching or changes in color or texture of skin.  HEAD: No headaches or recent head trauma.  EYES: Visual acuity fine. No photophobia, ocular pain or diplopia.  EARS: Denies ear pain, discharge or vertigo.  NOSE: No loss of smell, no epistaxis or postnasal drip.  MOUTH & THROAT: No hoarseness or change in voice. No excessive gum bleeding.  NODES: Denies swollen glands.  CHEST: Denies ARIZA, cyanosis, wheezing, cough and sputum production.  CARDIOVASCULAR: Denies chest pain, PND, orthopnea or reduced " exercise tolerance.  ABDOMEN: Appetite fine. No weight loss. Denies diarrhea, abdominal pain, hematemesis or blood in stool.  URINARY: No flank pain, dysuria or hematuria.  PERIPHERAL VASCULAR: No claudication or cyanosis.  NEUROLOGIC: No history of seizures, paralysis, alteration of gait or coordination.  MUSCULOSKELETAL: See HPI    PE:  APPEARANCE: Well nourished, well developed, in no acute distress.   HEAD: Normocephalic, atraumatic.  EYES: PERRL. EOMI.   EARS: TM's intact. Light reflex normal. No retraction or perforation.   NOSE: Mucosa pink. Airway clear.  MOUTH & THROAT: No tonsillar enlargement. No pharyngeal erythema or exudate. No stridor.  NECK: Supple.   NODES: No cervical, axillary or inguinal lymph node enlargement.  CHEST: Lungs clear to auscultation.  CARDIOVASCULAR: Normal S1, S2. No rubs, murmurs or gallops.  ABDOMEN: Bowel sounds normal. Not distended. Soft. No tenderness or masses.  NEUROLOGIC: Cranial Nerves: II-XII grossly intact, also see MUSCULOSKELETAL  MUSCULOSKELETAL:        Left ankle - 2 plus dorsalis pedis and posterior tibial artery pulses, light touch intact Left lower extremity.  All digits are warm. No erythema, no warmth, no drainage, mild swelling, mildtenderness.  Less than 2 seconds capillary refill all digits.      ASSESSMENT:  The patient is stable and improving.      PLAN:   Hardware removal  Increase ROM  Continue physical therapy  Return to work full duties  Follow up in  weeks

## 2018-03-02 DIAGNOSIS — Z01.818 PRE-OP TESTING: Primary | ICD-10-CM

## 2018-03-02 DIAGNOSIS — T84.84XA PAINFUL ORTHOPAEDIC HARDWARE: ICD-10-CM

## 2018-04-02 ENCOUNTER — LAB VISIT (OUTPATIENT)
Dept: LAB | Facility: HOSPITAL | Age: 38
End: 2018-04-02
Attending: ORTHOPAEDIC SURGERY
Payer: MEDICAID

## 2018-04-02 DIAGNOSIS — Z01.818 PRE-OP TESTING: ICD-10-CM

## 2018-04-02 LAB
ALBUMIN SERPL BCP-MCNC: 3.7 G/DL
ALP SERPL-CCNC: 71 U/L
ALT SERPL W/O P-5'-P-CCNC: 11 U/L
ANION GAP SERPL CALC-SCNC: 7 MMOL/L
AST SERPL-CCNC: 14 U/L
BASOPHILS # BLD AUTO: 0.02 K/UL
BASOPHILS NFR BLD: 0.3 %
BILIRUB SERPL-MCNC: 0.5 MG/DL
BUN SERPL-MCNC: 12 MG/DL
CALCIUM SERPL-MCNC: 9.2 MG/DL
CHLORIDE SERPL-SCNC: 106 MMOL/L
CO2 SERPL-SCNC: 24 MMOL/L
CREAT SERPL-MCNC: 0.7 MG/DL
DIFFERENTIAL METHOD: NORMAL
EOSINOPHIL # BLD AUTO: 0.1 K/UL
EOSINOPHIL NFR BLD: 1.6 %
ERYTHROCYTE [DISTWIDTH] IN BLOOD BY AUTOMATED COUNT: 14.1 %
EST. GFR  (AFRICAN AMERICAN): >60 ML/MIN/1.73 M^2
EST. GFR  (NON AFRICAN AMERICAN): >60 ML/MIN/1.73 M^2
GLUCOSE SERPL-MCNC: 115 MG/DL
HCG INTACT+B SERPL-ACNC: <2 MIU/ML
HCT VFR BLD AUTO: 37.8 %
HGB BLD-MCNC: 12.2 G/DL
LYMPHOCYTES # BLD AUTO: 2.4 K/UL
LYMPHOCYTES NFR BLD: 35.8 %
MCH RBC QN AUTO: 27 PG
MCHC RBC AUTO-ENTMCNC: 32.3 G/DL
MCV RBC AUTO: 84 FL
MONOCYTES # BLD AUTO: 0.6 K/UL
MONOCYTES NFR BLD: 8.2 %
NEUTROPHILS # BLD AUTO: 3.7 K/UL
NEUTROPHILS NFR BLD: 54.2 %
PLATELET # BLD AUTO: 261 K/UL
PMV BLD AUTO: 9.2 FL
POTASSIUM SERPL-SCNC: 4.3 MMOL/L
PROT SERPL-MCNC: 7 G/DL
RBC # BLD AUTO: 4.52 M/UL
SODIUM SERPL-SCNC: 137 MMOL/L
WBC # BLD AUTO: 6.74 K/UL

## 2018-04-02 PROCEDURE — 80053 COMPREHEN METABOLIC PANEL: CPT | Mod: PO

## 2018-04-02 PROCEDURE — 84702 CHORIONIC GONADOTROPIN TEST: CPT | Mod: PO

## 2018-04-02 PROCEDURE — 36415 COLL VENOUS BLD VENIPUNCTURE: CPT | Mod: PO

## 2018-04-02 PROCEDURE — 85025 COMPLETE CBC W/AUTO DIFF WBC: CPT | Mod: PO

## 2018-04-05 NOTE — PRE-PROCEDURE INSTRUCTIONS
Pre op instructions reviewed with patient per phone:    To confirm, Your surgeon has instructed you:  Surgery is scheduled 4/11/181 at 1030.      Please report to [facility] 1[facility] floor main lobby by 0900.   Pre admit office to call afternoon prior to surgery with final arrival time      INSTRUCTIONS IMPORTANT!!!  ¨ Do not eat, drink, or smoke after 12 midnight-including water. OK to brush teeth, no gum, candy or mints!    ¨ Take only these medicines with a small swallow of water-morning of surgery.  None  No Artificial nails or nail polish    ____  Do not wear makeup, including mascara.  ____  No powder, lotions or creams to surgical area.  ____  Please remove all jewelry, including piercings and leave at home.  ____  No money or valuables needed. Please leave at home.  ____  Please bring identification and insurance information to hospital.  ____  If going home the same day, arrange for a ride home. You will not be able to   drive if Anesthesia was used.  ____  Children, under 12 years old, must remain in the waiting room with an adult.  They are not allowed in patient areas.  ____  Wear loose fitting clothing. Allow for dressings, bandages.  ____  Stop Aspirin, Ibuprofen, Motrin and Aleve at least 5-7 days before surgery, unless otherwise instructed by your doctor, or the nurse.   You MAY use Tylenol/acetaminophen until day of surgery.  ____  If you take diabetic medication, do not take am of surgery unless instructed by   Doctor.  ____ Stop taking any Fish Oil supplement or any Vitamins that contain Vitamin E at least 5 days prior to surgery.          Bathing Instructions-- The night before surgery and the morning prior to coming to the hospital:   -Do not shave the surgical area.   -Shower and wash your hair and body as usual with anti-bacterial  soap and shampoo.   -Rinse your hair and body completely.   -Use one packet of hibiclens to wash the surgical site (using your hand) gently for  5 minutes.  Do not scrub you skin too hard.   -Do not use hibiclens on your head, face, or genitals.   -Do not wash with anti-bacterial soap after you use the hibiclens.   -Rinse your body thoroughly.   -Dry with clean, soft towel.  Do not use lotion, cream, deodorant, or powders on   the surgical site.    Use antibacterial soap in place of hibiclens if your surgery is on the head, face or genitals.         Surgical Site Infection    Prevention of surgical site infections:     -Keep incisions clean and dry.   -Do not soak/submerge incisions in water until completely healed.   -Do not apply lotions, powders, creams, or deodorants to site.   -Always make sure hands are cleaned with antibacterial soap/ alcohol-based   prior to touching the surgical site.  (This includes doctors, nurses, staff, and yourself.)    Signs and symptoms:   -Redness and pain around the area where you had surgery   -Drainage of cloudy fluid from your surgical wound   -Fever over 100.4  I have read or had read and explained to me, and understand the above information.

## 2018-04-10 ENCOUNTER — ANESTHESIA EVENT (OUTPATIENT)
Dept: SURGERY | Facility: HOSPITAL | Age: 38
End: 2018-04-10
Payer: MEDICAID

## 2018-04-11 ENCOUNTER — SURGERY (OUTPATIENT)
Age: 38
End: 2018-04-11

## 2018-04-11 ENCOUNTER — ANESTHESIA (OUTPATIENT)
Dept: SURGERY | Facility: HOSPITAL | Age: 38
End: 2018-04-11
Payer: MEDICAID

## 2018-04-11 ENCOUNTER — HOSPITAL ENCOUNTER (OUTPATIENT)
Facility: HOSPITAL | Age: 38
Discharge: HOME | End: 2018-04-11
Attending: ORTHOPAEDIC SURGERY | Admitting: ORTHOPAEDIC SURGERY
Payer: MEDICAID

## 2018-04-11 DIAGNOSIS — Z98.890 POST-OPERATIVE STATE: Primary | ICD-10-CM

## 2018-04-11 DIAGNOSIS — M25.572 LEFT ANKLE PAIN: ICD-10-CM

## 2018-04-11 DIAGNOSIS — Z98.890 POSTOPERATIVE STATE: ICD-10-CM

## 2018-04-11 PROCEDURE — 36000706: Performed by: ORTHOPAEDIC SURGERY

## 2018-04-11 PROCEDURE — 25000003 PHARM REV CODE 250: Performed by: ORTHOPAEDIC SURGERY

## 2018-04-11 PROCEDURE — 88300 SURGICAL PATH GROSS: CPT | Mod: 26,,, | Performed by: PATHOLOGY

## 2018-04-11 PROCEDURE — 25000003 PHARM REV CODE 250: Performed by: ANESTHESIOLOGY

## 2018-04-11 PROCEDURE — 63600175 PHARM REV CODE 636 W HCPCS: Performed by: ORTHOPAEDIC SURGERY

## 2018-04-11 PROCEDURE — 71000015 HC POSTOP RECOV 1ST HR: Performed by: ORTHOPAEDIC SURGERY

## 2018-04-11 PROCEDURE — 20680 REMOVAL OF IMPLANT DEEP: CPT | Mod: ,,, | Performed by: ORTHOPAEDIC SURGERY

## 2018-04-11 PROCEDURE — 01480 ANES OPEN PX LOWER L/A/F NOS: CPT | Performed by: ORTHOPAEDIC SURGERY

## 2018-04-11 PROCEDURE — S0020 INJECTION, BUPIVICAINE HYDRO: HCPCS | Performed by: ORTHOPAEDIC SURGERY

## 2018-04-11 PROCEDURE — 88300 SURGICAL PATH GROSS: CPT | Performed by: PATHOLOGY

## 2018-04-11 PROCEDURE — 63600175 PHARM REV CODE 636 W HCPCS: Performed by: NURSE ANESTHETIST, CERTIFIED REGISTERED

## 2018-04-11 PROCEDURE — 71000033 HC RECOVERY, INTIAL HOUR: Performed by: ORTHOPAEDIC SURGERY

## 2018-04-11 PROCEDURE — 37000009 HC ANESTHESIA EA ADD 15 MINS: Performed by: ORTHOPAEDIC SURGERY

## 2018-04-11 PROCEDURE — 63600175 PHARM REV CODE 636 W HCPCS: Performed by: ANESTHESIOLOGY

## 2018-04-11 PROCEDURE — 36000707: Performed by: ORTHOPAEDIC SURGERY

## 2018-04-11 PROCEDURE — 37000008 HC ANESTHESIA 1ST 15 MINUTES: Performed by: ORTHOPAEDIC SURGERY

## 2018-04-11 PROCEDURE — 71000016 HC POSTOP RECOV ADDL HR: Performed by: ORTHOPAEDIC SURGERY

## 2018-04-11 RX ORDER — MEPERIDINE HYDROCHLORIDE 50 MG/ML
12.5 INJECTION INTRAMUSCULAR; INTRAVENOUS; SUBCUTANEOUS ONCE AS NEEDED
Status: DISCONTINUED | OUTPATIENT
Start: 2018-04-11 | End: 2018-04-11 | Stop reason: HOSPADM

## 2018-04-11 RX ORDER — HYDROCODONE BITARTRATE AND ACETAMINOPHEN 5; 325 MG/1; MG/1
1 TABLET ORAL
Status: DISCONTINUED | OUTPATIENT
Start: 2018-04-11 | End: 2018-04-11 | Stop reason: HOSPADM

## 2018-04-11 RX ORDER — FENTANYL CITRATE 50 UG/ML
INJECTION, SOLUTION INTRAMUSCULAR; INTRAVENOUS
Status: DISCONTINUED | OUTPATIENT
Start: 2018-04-11 | End: 2018-04-11

## 2018-04-11 RX ORDER — DIPHENHYDRAMINE HYDROCHLORIDE 50 MG/ML
25 INJECTION INTRAMUSCULAR; INTRAVENOUS EVERY 6 HOURS PRN
Status: DISCONTINUED | OUTPATIENT
Start: 2018-04-11 | End: 2018-04-11 | Stop reason: HOSPADM

## 2018-04-11 RX ORDER — CEFAZOLIN SODIUM 2 G/50ML
2 SOLUTION INTRAVENOUS
Status: DISCONTINUED | OUTPATIENT
Start: 2018-04-11 | End: 2018-04-11 | Stop reason: HOSPADM

## 2018-04-11 RX ORDER — SODIUM CHLORIDE, SODIUM LACTATE, POTASSIUM CHLORIDE, CALCIUM CHLORIDE 600; 310; 30; 20 MG/100ML; MG/100ML; MG/100ML; MG/100ML
INJECTION, SOLUTION INTRAVENOUS CONTINUOUS
Status: DISCONTINUED | OUTPATIENT
Start: 2018-04-11 | End: 2018-04-11 | Stop reason: HOSPADM

## 2018-04-11 RX ORDER — ONDANSETRON 2 MG/ML
INJECTION INTRAMUSCULAR; INTRAVENOUS
Status: DISCONTINUED | OUTPATIENT
Start: 2018-04-11 | End: 2018-04-11

## 2018-04-11 RX ORDER — ONDANSETRON 2 MG/ML
4 INJECTION INTRAMUSCULAR; INTRAVENOUS DAILY PRN
Status: DISCONTINUED | OUTPATIENT
Start: 2018-04-11 | End: 2018-04-11 | Stop reason: HOSPADM

## 2018-04-11 RX ORDER — DEXAMETHASONE SODIUM PHOSPHATE 4 MG/ML
INJECTION, SOLUTION INTRA-ARTICULAR; INTRALESIONAL; INTRAMUSCULAR; INTRAVENOUS; SOFT TISSUE
Status: DISCONTINUED | OUTPATIENT
Start: 2018-04-11 | End: 2018-04-11

## 2018-04-11 RX ORDER — CHLORHEXIDINE GLUCONATE ORAL RINSE 1.2 MG/ML
10 SOLUTION DENTAL 2 TIMES DAILY
Status: DISCONTINUED | OUTPATIENT
Start: 2018-04-11 | End: 2018-04-11 | Stop reason: HOSPADM

## 2018-04-11 RX ORDER — CHLORHEXIDINE GLUCONATE ORAL RINSE 1.2 MG/ML
10 SOLUTION DENTAL
Status: DISCONTINUED | OUTPATIENT
Start: 2018-04-11 | End: 2018-04-11 | Stop reason: HOSPADM

## 2018-04-11 RX ORDER — PROPOFOL 10 MG/ML
VIAL (ML) INTRAVENOUS
Status: DISCONTINUED | OUTPATIENT
Start: 2018-04-11 | End: 2018-04-11

## 2018-04-11 RX ORDER — BUPIVACAINE HYDROCHLORIDE 5 MG/ML
INJECTION, SOLUTION EPIDURAL; INTRACAUDAL
Status: DISCONTINUED | OUTPATIENT
Start: 2018-04-11 | End: 2018-04-11 | Stop reason: HOSPADM

## 2018-04-11 RX ORDER — HYDROCODONE BITARTRATE AND ACETAMINOPHEN 5; 325 MG/1; MG/1
1 TABLET ORAL EVERY 4 HOURS PRN
Status: DISCONTINUED | OUTPATIENT
Start: 2018-04-11 | End: 2018-04-11 | Stop reason: HOSPADM

## 2018-04-11 RX ORDER — SODIUM CHLORIDE 9 MG/ML
INJECTION, SOLUTION INTRAVENOUS CONTINUOUS
Status: DISCONTINUED | OUTPATIENT
Start: 2018-04-11 | End: 2018-04-11 | Stop reason: HOSPADM

## 2018-04-11 RX ORDER — OXYCODONE HYDROCHLORIDE 5 MG/1
5 TABLET ORAL ONCE
Status: COMPLETED | OUTPATIENT
Start: 2018-04-11 | End: 2018-04-11

## 2018-04-11 RX ORDER — MIDAZOLAM HYDROCHLORIDE 1 MG/ML
INJECTION, SOLUTION INTRAMUSCULAR; INTRAVENOUS
Status: DISCONTINUED | OUTPATIENT
Start: 2018-04-11 | End: 2018-04-11

## 2018-04-11 RX ORDER — FENTANYL CITRATE 50 UG/ML
25 INJECTION, SOLUTION INTRAMUSCULAR; INTRAVENOUS EVERY 5 MIN PRN
Status: COMPLETED | OUTPATIENT
Start: 2018-04-11 | End: 2018-04-11

## 2018-04-11 RX ORDER — ACETAMINOPHEN 10 MG/ML
1000 INJECTION, SOLUTION INTRAVENOUS ONCE
Status: COMPLETED | OUTPATIENT
Start: 2018-04-11 | End: 2018-04-11

## 2018-04-11 RX ORDER — HYDROCODONE BITARTRATE AND ACETAMINOPHEN 5; 325 MG/1; MG/1
1 TABLET ORAL EVERY 6 HOURS PRN
Qty: 30 TABLET | Refills: 0 | Status: SHIPPED | OUTPATIENT
Start: 2018-04-11 | End: 2018-06-28

## 2018-04-11 RX ORDER — HYDROCODONE BITARTRATE AND ACETAMINOPHEN 10; 325 MG/1; MG/1
1 TABLET ORAL EVERY 4 HOURS PRN
Qty: 60 TABLET | Refills: 0 | Status: SHIPPED | OUTPATIENT
Start: 2018-04-11 | End: 2018-04-21

## 2018-04-11 RX ADMIN — FENTANYL CITRATE 25 MCG: 50 INJECTION, SOLUTION INTRAMUSCULAR; INTRAVENOUS at 10:04

## 2018-04-11 RX ADMIN — FENTANYL CITRATE 50 MCG: 50 INJECTION, SOLUTION INTRAMUSCULAR; INTRAVENOUS at 11:04

## 2018-04-11 RX ADMIN — CEFAZOLIN SODIUM 2 G: 2 SOLUTION INTRAVENOUS at 10:04

## 2018-04-11 RX ADMIN — ONDANSETRON 4 MG: 2 INJECTION, SOLUTION INTRAMUSCULAR; INTRAVENOUS at 11:04

## 2018-04-11 RX ADMIN — BUPIVACAINE HYDROCHLORIDE 10 ML: 5 INJECTION, SOLUTION EPIDURAL; INTRACAUDAL; PERINEURAL at 10:04

## 2018-04-11 RX ADMIN — FENTANYL CITRATE 25 MCG: 50 INJECTION, SOLUTION INTRAMUSCULAR; INTRAVENOUS at 12:04

## 2018-04-11 RX ADMIN — PROPOFOL 200 MG: 10 INJECTION, EMULSION INTRAVENOUS at 10:04

## 2018-04-11 RX ADMIN — SODIUM CHLORIDE, SODIUM LACTATE, POTASSIUM CHLORIDE, AND CALCIUM CHLORIDE: 600; 310; 30; 20 INJECTION, SOLUTION INTRAVENOUS at 10:04

## 2018-04-11 RX ADMIN — ACETAMINOPHEN 1000 MG: 10 INJECTION, SOLUTION INTRAVENOUS at 12:04

## 2018-04-11 RX ADMIN — MIDAZOLAM 2 MG: 1 INJECTION INTRAMUSCULAR; INTRAVENOUS at 10:04

## 2018-04-11 RX ADMIN — OXYCODONE HYDROCHLORIDE 5 MG: 5 TABLET ORAL at 12:04

## 2018-04-11 RX ADMIN — DEXAMETHASONE SODIUM PHOSPHATE 4 MG: 4 INJECTION, SOLUTION INTRA-ARTICULAR; INTRALESIONAL; INTRAMUSCULAR; INTRAVENOUS; SOFT TISSUE at 11:04

## 2018-04-11 NOTE — PLAN OF CARE
Pharmacy called unit to inform there is an insurance issue with filling prescriptions and will be a delay. Pt daughter states she will drive her mom home and come back later to retrieve medications.

## 2018-04-11 NOTE — H&P
"SUBJECTIVE:  Patient is status post Left ankle ORIF.     Date of surgery: 09/29/2017     The patient appears very comfortable.    Patient complains of  5/10 pain that is better with the  pain meds and aggravated with movement. Pain increase at night          Past Medical History:   Diagnosis Date    Asthma              Past Surgical History:   Procedure Laterality Date    BREAST LUMPECTOMY Right      HYSTERECTOMY        TUBAL LIGATION          Review of patient's allergies indicates:  No Known Allergies         Current Outpatient Prescriptions on File Prior to Visit   Medication Sig Dispense Refill    acetaminophen (TYLENOL) 500 MG tablet Take 500 mg by mouth every 6 (six) hours as needed for Pain.        albuterol 90 mcg/actuation inhaler Inhale 2 puffs into the lungs every 4 (four) hours as needed for Wheezing. 1 Inhaler 0    DULERA 100-5 mcg/actuation HFAA INHALE 1 PUFF PO BID   1    fluticasone (FLONASE) 50 mcg/actuation nasal spray USE 1 SPRAY IN EACH NOSTRIL BID. RINSE MOUTH AFTER EACH USE   1    medroxyPROGESTERone (DEPO-PROVERA) 150 mg/mL injection Inject 150 mg into the muscle every 3 (three) months.        naproxen (NAPROSYN) 500 MG tablet TK 1 T PO  BID X 7 DAYS   0      No current facility-administered medications on file prior to visit.       /87   Pulse 77   Ht 5' 7" (1.702 m)   Wt 90.7 kg (199 lb 15.3 oz)   LMP 02/02/2017   BMI 31.32 kg/m²    ROS:  GENERAL: No fever, chills, fatigability or weight loss.  SKIN: No rashes, itching or changes in color or texture of skin.  HEAD: No headaches or recent head trauma.  EYES: Visual acuity fine. No photophobia, ocular pain or diplopia.  EARS: Denies ear pain, discharge or vertigo.  NOSE: No loss of smell, no epistaxis or postnasal drip.  MOUTH & THROAT: No hoarseness or change in voice. No excessive gum bleeding.  NODES: Denies swollen glands.  CHEST: Denies ARIZA, cyanosis, wheezing, cough and sputum production.  CARDIOVASCULAR: Denies " chest pain, PND, orthopnea or reduced exercise tolerance.  ABDOMEN: Appetite fine. No weight loss. Denies diarrhea, abdominal pain, hematemesis or blood in stool.  URINARY: No flank pain, dysuria or hematuria.  PERIPHERAL VASCULAR: No claudication or cyanosis.  NEUROLOGIC: No history of seizures, paralysis, alteration of gait or coordination.  MUSCULOSKELETAL: See HPI     PE:  APPEARANCE: Well nourished, well developed, in no acute distress.   HEAD: Normocephalic, atraumatic.  EYES: PERRL. EOMI.   EARS: TM's intact. Light reflex normal. No retraction or perforation.   NOSE: Mucosa pink. Airway clear.  MOUTH & THROAT: No tonsillar enlargement. No pharyngeal erythema or exudate. No stridor.  NECK: Supple.   NODES: No cervical, axillary or inguinal lymph node enlargement.  CHEST: Lungs clear to auscultation.  CARDIOVASCULAR: Normal S1, S2. No rubs, murmurs or gallops.  ABDOMEN: Bowel sounds normal. Not distended. Soft. No tenderness or masses.  NEUROLOGIC: Cranial Nerves: II-XII grossly intact, also see MUSCULOSKELETAL  MUSCULOSKELETAL:         Left ankle - 2 plus dorsalis pedis and posterior tibial artery pulses, light touch intact Left lower extremity.  All digits are warm. No erythema, no warmth, no drainage, mild swelling, mildtenderness.  Less than 2 seconds capillary refill all digits.        ASSESSMENT:  The patient is stable and improving.        PLAN:  Syndesmosis screw Hardware removal  Increase ROM  Continue physical therapy  Return to work full duties  Follow up in  weeks

## 2018-04-11 NOTE — ANESTHESIA POSTPROCEDURE EVALUATION
"Anesthesia Post Evaluation    Patient: Janice Kerr    Procedure(s) Performed: Procedure(s) (LRB):  REMOVAL-HARDWARE-LEFT ANKLE (Left)    Final Anesthesia Type: general  Patient location during evaluation: PACU  Patient participation: Yes- Able to Participate  Level of consciousness: awake and alert  Post-procedure vital signs: reviewed and stable  Pain management: adequate  Airway patency: patent  PONV status at discharge: No PONV  Anesthetic complications: no      Cardiovascular status: blood pressure returned to baseline  Respiratory status: unassisted and spontaneous ventilation  Hydration status: euvolemic  Follow-up not needed.        Visit Vitals  /64   Pulse 73   Temp 36.9 °C (98.4 °F) (Temporal)   Resp 13   Ht 5' 7" (1.702 m)   Wt 95.7 kg (210 lb 15.7 oz)   LMP 02/02/2017   SpO2 100%   Breastfeeding? No   BMI 33.04 kg/m²       Pain/Garrett Score: Pain Assessment Performed: Yes (4/11/2018  1:00 PM)  Presence of Pain: complains of pain/discomfort (4/11/2018  1:00 PM)  Pain Rating Prior to Med Admin: 5 (4/11/2018 12:39 PM)  Garrett Score: 10 (4/11/2018 12:45 PM)      "

## 2018-04-11 NOTE — DISCHARGE INSTRUCTIONS
General Information:    1. Do not drink alcoholic beverages including beer for 24 hours or as long as you are on pain medication..  2. Do not drive a motor vehicle, operate machinery or power tools, or signs legal papers for 24 hours or as long as you are on pain medication.   3. You may experience light-headedness, dizziness, and sleepiness following surgery. Please do not stay alone. A responsible adult should be with you for this 24 hour period.  4. Go home and rest.  5. Progress slowly to a normal diet unless instructed.  Otherwise, begin with liquids such as soft drinks, then soup and crackers working up to solid foods. Drink plenty of nonalcoholic fluids.  6. Certain anesthetics and pain medications produce nausea and vomiting in certain individuals. If nausea becomes a problem at home, call you doctor.  7. A nurse will be calling you sometime after surgery. Do not be alarmed. This is our way of finding out how you are doing.  8. Several times every hour while you are awake, take 2-3 deep breaths and cough. If you had stomach surgery hold a pillow or rolled towel firmly against your stomach before you cough. This will help with any pain the cough might cause.  9. Several times every hour while you are awake, pump and flex your feet 5-6 times and do foot circles. This will help prevent blood clots.  10. Call your doctor for severe pain, bleeding, fever, or signs or symptoms of infection (pain, swelling, redness, foul odor, drainage).  11. You can contact your doctor anytime by [phone] for the [facility] (at [facility]) or [phone] for the [facility] on [facility].   [email or web address] is another way to contact your doctor if you are an active participant online with [facility].  12. Continue Nozin provided at discharge twice daily for 7 days or until the incision is healed.  See pamphlet or box for instructions.

## 2018-04-11 NOTE — OP NOTE
OPERATIVE DICTATION:    DATE OF SERVICE: 04/11/2018      Preoperative Diagnosis:  Left ankle syndesmosis screw    Postoperative Diagnosis:   Left ankle syndesmosis screw    Procedure: Left ankle syndesmosis screw removal    Indication for surgery: Left ankle syndesmosis screw    Anesthesia:  General    Complications:  none    Surgeon: Lino Larsen M.D.     Specimen:Left ankle syndesmosis screw    Assistant:  RADAMES Augustin. His assistance was critical and necesssary with positioning of the extremity throughout the surgical procedure.The physician assistant allowed me to access areas of the left ankle that could not be possible without the assistance of a trained orthopedic physician assistant.  His assistance was critical to allowing me to provide the highest level of care to the patient.      Operative procedure:  The patient brought to operating room after appropriate consent and placed under general anesthesia with intubation.  Placed in a supine position.  The timeout was performing the correct extremity identified.  The Esmarch used for exsanguination and tourniquet inflated to 3 mmHg pressure.  The fluoroscopy was used to localize the screw.  Half centimeters incision was made over the syndesmosis screw head.  Dissection carried bluntly down to the screw which was noted to be broken and admitted at its midportion.  The superficial portion of the screw was removed.  The drill was  advanced just distal to the deep portion of the screw.  A drill hole was made through the cortex.  The incision was then made made medially over the distal tip of the fractured screw.  Dissection was carried down to the cortex.  The screw identified.  The bone tamp was then used to advance the screw tip which was removed.  Irrigation was performed and the overlying skin medially and laterally were closed using interrupted 3-0 nylon sutures.  Betadine gauze and Ace wrap placed around the incision site.  The tourniquet deflated.   The patient tolerated procedure well and left the operating room in good condition.    Lino Larsen M.D.

## 2018-04-11 NOTE — DISCHARGE SUMMARY
[facility]  Brief Operative Note     SUMMARY     Surgery Date: 4/11/2018     Surgeon(s) and Role:     * Lino Larsen Sr., MD - Primary    Assisting Surgeon: None    Pre-op Diagnosis:  Painful orthopaedic hardware [T84.84XA]  Left ankle syndesmosis screw    Post-op Diagnosis:  Post-Op Diagnosis Codes:     * Painful orthopaedic hardware [T84.84XA]  Left ankle syndesmosis screw    Procedure(s) (LRB):  REMOVAL-HARDWARE-LEFT ANKLE (Left)    Anesthesia: General    Description of the findings of the procedure: Left ankle syndesmosis screw      Findings/Key Components:  Left ankle syndesmosis screw    Estimated Blood Loss: 1 cc         Specimens:   Specimen (12h ago through future)    Start     Ordered    04/11/18 1137  Specimen to Pathology - Surgery  Once     Comments:  1. Hardware from left ankle (gross path) Dx: Painful orthopaedic hardware      04/11/18 1138          Discharge Note    SUMMARY     Admit Date: 4/11/2018    Discharge Date and Time:  04/11/2018 11:50 AM    Hospital Course (synopsis of major diagnoses, care, treatment, and services provided during the course of the hospital stay): without complications     Final Diagnosis: Post-Op Diagnosis Codes:     * Painful orthopaedic hardware [T84.84XA]  Left ankle syndesmosis screw    Disposition: Home or Self Care    Follow Up/Patient Instructions: follow up in 2 weeks, resume regular diet.     Medications:[place]  Reconciled Home Medications:      Medication List      START taking these medications    hydrocodone-acetaminophen 10-325mg  mg Tab  Commonly known as:  NORCO  Take 1 tablet by mouth every 4 (four) hours as needed.        CONTINUE taking these medications    acetaminophen 500 MG tablet  Commonly known as:  TYLENOL  Take 500 mg by mouth every 6 (six) hours as needed for Pain.     albuterol 90 mcg/actuation inhaler  Inhale 2 puffs into the lungs every 4 (four) hours as needed for Wheezing.     DULERA 100-5 mcg/actuation Hfaa  Generic  "drug:  mometasone-formoterol  INHALE 1 PUFF PO BID     fluticasone 50 mcg/actuation nasal spray  Commonly known as:  FLONASE  USE 1 SPRAY IN EACH NOSTRIL BID. RINSE MOUTH AFTER EACH USE     medroxyPROGESTERone 150 mg/mL injection  Commonly known as:  DEPO-PROVERA  Inject 150 mg into the muscle every 3 (three) months.     promethazine-dextromethorphan 6.25-15 mg/5 mL Syrp  Commonly known as:  PROMETHAZINE-DM  TK 5 ML PO Q 6 H PRF COUGH. ANJU        STOP taking these medications    naproxen 500 MG tablet  Commonly known as:  NAPROSYN            Discharge Procedure Orders  CRUTCHES FOR HOME USE   Order Specific Question Answer Comments   Type: Axillary    Height: 5' 7" (1.702 m)    Weight: 95.7 kg (210 lb 15.7 oz)    Length of need (1-99 months): 3      Diet general     Call MD for:  temperature >100.4     Call MD for:  persistent nausea and vomiting     Call MD for:  severe uncontrolled pain     Call MD for:  difficulty breathing, headache or visual disturbances     Call MD for:  redness, tenderness, or signs of infection (pain, swelling, redness, odor or green/yellow discharge around incision site)     Call MD for:  hives     Call MD for:  persistent dizziness or light-headedness     Call MD for:  extreme fatigue     Other restrictions (specify):   Order Comments: Crutch assisted ambulation NWB left lower extremity     Remove dressing in 48 hours   Order Comments: Apply betadine, gauze and an ace wrap daily, after 48 hours.       Follow-up Information     Call Lino Larsen Sr, MD.    Specialty:  Orthopedic Surgery  Why:  As needed, If symptoms worsen, For suture removal, For wound re-check  Contact information:  [address] [facility] [place] [address]  [phone]                 "

## 2018-04-11 NOTE — ANESTHESIA PREPROCEDURE EVALUATION
04/11/2018  Janice Kerr is a 37 y.o., female.    Anesthesia Evaluation    I have reviewed the Patient Summary Reports.    I have reviewed the Nursing Notes.   I have reviewed the Medications.     Review of Systems  Anesthesia Hx:  No problems with previous Anesthesia  Denies Family Hx of Anesthesia complications.   Denies Personal Hx of Anesthesia complications.   Social:  Smoker    Hematology/Oncology:  Hematology Normal   Oncology Normal     EENT/Dental:EENT/Dental Normal   Cardiovascular:  Cardiovascular Normal     Pulmonary:   Asthma    Renal/:  Renal/ Normal     Hepatic/GI:  Hepatic/GI Normal    Musculoskeletal:   Left ankle pain   Neurological:  Neurology Normal    Endocrine:  Endocrine Normal    Psych:  Psychiatric Normal           Physical Exam  General:  Well nourished    Airway/Jaw/Neck:  Airway Findings: Mouth Opening: Normal Tongue: Normal  General Airway Assessment: Adult  Mallampati: I  TM Distance: Normal, at least 6 cm  Jaw/Neck Findings:  Neck ROM: Normal ROM       Chest/Lungs:  Chest/Lungs Findings: Clear to auscultation, Normal Respiratory Rate     Heart/Vascular:  Heart Findings: Rate: Normal  Rhythm: Regular Rhythm             Anesthesia Plan  Type of Anesthesia, risks & benefits discussed:  Anesthesia Type:  general  Patient's Preference:   Intra-op Monitoring Plan:   Intra-op Monitoring Plan Comments:   Post Op Pain Control Plan: multimodal analgesia, peripheral nerve block and IV/PO Opioids PRN  Post Op Pain Control Plan Comments:   Induction:    Beta Blocker:  Patient is not currently on a Beta-Blocker (No further documentation required).       Informed Consent: Patient understands risks and agrees with Anesthesia plan.  Questions answered. Anesthesia consent signed with patient.  ASA Score: 2     Day of Surgery Review of History & Physical: I have interviewed and  examined the patient. I have reviewed the patient's H&P dated:  There are no significant changes.  H&P update referred to the surgeon.         Ready For Surgery From Anesthesia Perspective.

## 2018-04-11 NOTE — ANESTHESIA RELEASE NOTE
"Anesthesia Release from PACU Note    Patient: Janice Kerr    Procedure(s) Performed: Procedure(s) (LRB):  REMOVAL-HARDWARE-LEFT ANKLE (Left)    Anesthesia type: general    Post pain: Adequate analgesia    Post assessment: no apparent anesthetic complications    Last Vitals:   Visit Vitals  /76 (BP Location: Right arm, Patient Position: Sitting)   Pulse (!) 53   Temp 36.7 °C (98.1 °F) (Tympanic)   Resp 20   Ht 5' 7" (1.702 m)   Wt 95.7 kg (210 lb 15.7 oz)   LMP 02/02/2017   SpO2 100%   Breastfeeding? No   BMI 33.04 kg/m²       Post vital signs: stable    Level of consciousness: awake    Nausea/Vomiting: no nausea/no vomiting    Complications: none    Airway Patency: patent    Respiratory: unassisted    Cardiovascular: stable and blood pressure at baseline    Hydration: euvolemic  "

## 2018-04-11 NOTE — TRANSFER OF CARE
"Anesthesia Transfer of Care Note    Patient: Janice Kerr    Procedure(s) Performed: Procedure(s) (LRB):  REMOVAL-HARDWARE-LEFT ANKLE (Left)    Patient location: PACU    Anesthesia Type: general    Transport from OR: Transported from OR on room air with adequate spontaneous ventilation    Post pain: adequate analgesia    Post assessment: no apparent anesthetic complications    Post vital signs: stable    Level of consciousness: awake    Nausea/Vomiting: no nausea/vomiting    Complications: none    Transfer of care protocol was followed      Last vitals:   Visit Vitals  /76 (BP Location: Right arm, Patient Position: Sitting)   Pulse (!) 53   Temp 36.7 °C (98.1 °F) (Tympanic)   Resp 20   Ht 5' 7" (1.702 m)   Wt 95.7 kg (210 lb 15.7 oz)   LMP 02/02/2017   SpO2 100%   Breastfeeding? No   BMI 33.04 kg/m²     "

## 2018-04-16 VITALS
OXYGEN SATURATION: 100 % | RESPIRATION RATE: 13 BRPM | DIASTOLIC BLOOD PRESSURE: 64 MMHG | TEMPERATURE: 98 F | WEIGHT: 211 LBS | HEART RATE: 73 BPM | SYSTOLIC BLOOD PRESSURE: 114 MMHG | BODY MASS INDEX: 33.12 KG/M2 | HEIGHT: 67 IN

## 2018-04-25 DIAGNOSIS — M25.572 LEFT ANKLE PAIN, UNSPECIFIED CHRONICITY: Primary | ICD-10-CM

## 2018-04-26 ENCOUNTER — HOSPITAL ENCOUNTER (OUTPATIENT)
Dept: RADIOLOGY | Facility: HOSPITAL | Age: 38
Discharge: HOME | End: 2018-04-26
Attending: ORTHOPAEDIC SURGERY
Payer: MEDICAID

## 2018-04-26 ENCOUNTER — OFFICE VISIT (OUTPATIENT)
Dept: ORTHOPEDICS | Facility: CLINIC | Age: 38
End: 2018-04-26
Payer: MEDICAID

## 2018-04-26 VITALS
BODY MASS INDEX: 32.96 KG/M2 | SYSTOLIC BLOOD PRESSURE: 109 MMHG | WEIGHT: 210 LBS | TEMPERATURE: 97 F | HEART RATE: 72 BPM | DIASTOLIC BLOOD PRESSURE: 71 MMHG | HEIGHT: 67 IN

## 2018-04-26 DIAGNOSIS — M25.572 LEFT ANKLE PAIN, UNSPECIFIED CHRONICITY: ICD-10-CM

## 2018-04-26 DIAGNOSIS — Z98.890 POSTOPERATIVE STATE: Primary | ICD-10-CM

## 2018-04-26 PROCEDURE — 73610 X-RAY EXAM OF ANKLE: CPT | Mod: 26,LT,, | Performed by: RADIOLOGY

## 2018-04-26 PROCEDURE — 99213 OFFICE O/P EST LOW 20 MIN: CPT | Mod: PBBFAC,25,PO | Performed by: ORTHOPAEDIC SURGERY

## 2018-04-26 PROCEDURE — 73610 X-RAY EXAM OF ANKLE: CPT | Mod: TC,FY,PO,LT

## 2018-04-26 PROCEDURE — 99999 PR PBB SHADOW E&M-EST. PATIENT-LVL III: CPT | Mod: PBBFAC,,, | Performed by: ORTHOPAEDIC SURGERY

## 2018-04-26 PROCEDURE — 99024 POSTOP FOLLOW-UP VISIT: CPT | Mod: ,,, | Performed by: ORTHOPAEDIC SURGERY

## 2018-04-26 NOTE — LETTER
April 26, 2018      [facility]  [address] [place] [address]  Phone: [phone]  Fax: [phone]       Patient: Janice Kerr   YOB: 1980  Date of Visit: 04/26/2018    To Whom It May Concern:    Victoriano Kerr  was at [facility] on 04/26/2018. She has been under our care since 9/29/17. She is to follow up with us PRN( as needed). She may return to work/school with no restrictions. If you have any questions or concerns, or if I can be of further assistance, please do not hesitate to contact me.    Sincerely,        The office of Lino Larsen MD

## 2018-04-27 NOTE — PROGRESS NOTES
"SUBJECTIVE:  Patient is status post Left ankle hardware removal.  Patient complains of  2/ 10 pain that is better with the  pain meds and aggravated with movement.    Past Medical History:   Diagnosis Date    Asthma      Past Surgical History:   Procedure Laterality Date    BREAST LUMPECTOMY Right     HYSTERECTOMY      TUBAL LIGATION       Review of patient's allergies indicates:  No Known Allergies  Current Outpatient Prescriptions on File Prior to Visit   Medication Sig Dispense Refill    hydrocodone-acetaminophen 5-325mg (NORCO) 5-325 mg per tablet Take 1 tablet by mouth every 6 (six) hours as needed for Pain. 30 tablet 0    promethazine-dextromethorphan (PROMETHAZINE-DM) 6.25-15 mg/5 mL Syrp TK 5 ML PO Q 6 H PRF COUGH. ANJU  0    acetaminophen (TYLENOL) 500 MG tablet Take 500 mg by mouth every 6 (six) hours as needed for Pain.      albuterol 90 mcg/actuation inhaler Inhale 2 puffs into the lungs every 4 (four) hours as needed for Wheezing. 1 Inhaler 0    DULERA 100-5 mcg/actuation HFAA INHALE 1 PUFF PO BID  1    fluticasone (FLONASE) 50 mcg/actuation nasal spray USE 1 SPRAY IN EACH NOSTRIL BID. RINSE MOUTH AFTER EACH USE  1    medroxyPROGESTERone (DEPO-PROVERA) 150 mg/mL injection Inject 150 mg into the muscle every 3 (three) months.       No current facility-administered medications on file prior to visit.      /71 (BP Location: Right arm, Patient Position: Sitting, BP Method: Medium (Automatic))   Pulse 72   Temp 97 °F (36.1 °C)   Ht 5' 7" (1.702 m)   Wt 95.3 kg (210 lb)   LMP 02/02/2017   BMI 32.89 kg/m²    ROS:  GENERAL: No fever, chills, fatigability or weight loss.  SKIN: No rashes, itching or changes in color or texture of skin.  HEAD: No headaches or recent head trauma.  EYES: Visual acuity fine. No photophobia, ocular pain or diplopia.  EARS: Denies ear pain, discharge or vertigo.  NOSE: No loss of smell, no epistaxis or postnasal drip.  MOUTH & THROAT: No hoarseness or change in " voice. No excessive gum bleeding.  NODES: Denies swollen glands.  CHEST: Denies ARIZA, cyanosis, wheezing, cough and sputum production.  CARDIOVASCULAR: Denies chest pain, PND, orthopnea or reduced exercise tolerance.  ABDOMEN: Appetite fine. No weight loss. Denies diarrhea, abdominal pain, hematemesis or blood in stool.  URINARY: No flank pain, dysuria or hematuria.  PERIPHERAL VASCULAR: No claudication or cyanosis.  NEUROLOGIC: No history of seizures, paralysis, alteration of gait or coordination.  MUSCULOSKELETAL: See HPI    PE:  APPEARANCE: Well nourished, well developed, in no acute distress.   HEAD: Normocephalic, atraumatic.  EYES: PERRL. EOMI.   EARS: TM's intact. Light reflex normal. No retraction or perforation.   NOSE: Mucosa pink. Airway clear.  MOUTH & THROAT: No tonsillar enlargement. No pharyngeal erythema or exudate. No stridor.  NECK: Supple.   NODES: No cervical, axillary or inguinal lymph node enlargement.  CHEST: Lungs clear to auscultation.  CARDIOVASCULAR: Normal S1, S2. No rubs, murmurs or gallops.  ABDOMEN: Bowel sounds normal. Not distended. Soft. No tenderness or masses.  NEUROLOGIC: Cranial Nerves: II-XII grossly intact, also see MUSCULOSKELETAL  MUSCULOSKELETAL:        Left  ankle  -dressing intact, 2 plus dorsalis pedis and posterior tibial artery pulses, light touch intact Left lower extremity.  All digits are warm. No erythema, no warmth, no drainage, no swelling, no significant tenderness.  Less than 2 seconds capillary refill all digits.      ASSESSMENT:    The patient is stable and improving.  X-Ray Ankle Complete 3 View Left  Narrative: EXAMINATION:  XR ANKLE COMPLETE 3 VIEW LEFT    CLINICAL HISTORY:  Pain in left ankle and joints of left foot    TECHNIQUE:  AP, lateral and oblique views of the left ankle were performed.    COMPARISON:  April 11, 2018    FINDINGS:  Plate and screw fixation fracture distal left fibula.  Fracture line not visualized with certainty on present exam.   Residual bony tract noted at the site of previous transverse screw placement distal tibia and fibula.  Minimal residual soft tissue swelling the ankle.  Impression: As above.    Electronically signed by: Todd Lou MD  Date:    04/26/2018  Time:    17:28        PLAN:  Follow-up in 6 weeks  Sutures removed  Crutch ambulation nonweightbearing left lower extremity for 8 weeks following surgery.  Continue pain medication  Continue wound care  Continue physical therapy  No Follow-up on file.

## 2018-04-27 NOTE — PATIENT INSTRUCTIONS
ACE Wrap  Minor muscle or joint injuries are often treated with an elastic bandage. The bandage provides support and compression to the injured area. An elastic bandage is a stretchy, rolled bandage. Elastic bandages range in width from 2 to 6 inches. They can be used for a variety of injuries. The bandages are often called ACE bandages, after the most common brand name.  If used correctly, elastic bandages help control swelling and ease pain. An elastic bandage is also a good reminder not to overuse the injured area. However, elastic bandages do not provide a lot of support and will not prevent reinjury.  Home care    To apply an elastic bandage:  · Check the skin before wrapping the injury. It should be clean, dry, and free of drainage.  · Start wrapping below the injury and work your way toward the body. For an ankle sprain, start wrapping around the foot and work up toward the calf. This will help control swelling.  · Overlap the edges of the bandage so it stays snuggly in place.  · Wrap the bandage firmly, but not too tightly. A tight bandage can increase swelling on either end of the bandage. Make sure the bandage is wrinkle free.  · Leave fingers and toes exposed.  · Secure ends of the bandage (even self-sticking ones) with clips or tape.  · Check frequently to ensure adequate circulation, especially in the fingers and toes. Loosen the bandage if there is local swelling, numbness, tingling, discomfort, coldness, or discoloration (skin pale or bluish in color).  · Rewrap the bandage as needed during the day. Reroll the bandage as you unwind it.  Continue using the elastic bandage until the pain and swelling are gone or as your healthcare provider advises.  If you have been told to ice the area, the ice can be secured in place with the elastic bandage. Wrap the ice pack with a thin towel to protect the skin. Do not put ice or an ice pack directly on the skin.  Ice the area for no more than 20 minutes at a  time.    Follow-up care  Follow up with your healthcare provider, as advised.  When to seek medical advice  Call your healthcare provider for any of the following:  · Pain and swelling that doesn't get better or gets worse  · Trouble moving injured area  · Skin discoloration, numbness, or tingling that doesnt go away after bandage is removed  Date Last Reviewed: 9/13/2015  © 9383-4224 [employer]. [address] [place] [address], [place] [address]. All rights reserved. This information is not intended as a substitute for professional medical care. Always follow your healthcare professional's instructions.

## 2018-06-01 ENCOUNTER — HOSPITAL ENCOUNTER (EMERGENCY)
Facility: HOSPITAL | Age: 38
Discharge: HOME | End: 2018-06-01
Attending: EMERGENCY MEDICINE
Payer: MEDICAID

## 2018-06-01 VITALS
TEMPERATURE: 99 F | BODY MASS INDEX: 32.96 KG/M2 | OXYGEN SATURATION: 100 % | HEIGHT: 67 IN | RESPIRATION RATE: 20 BRPM | SYSTOLIC BLOOD PRESSURE: 149 MMHG | WEIGHT: 210 LBS | HEART RATE: 60 BPM | DIASTOLIC BLOOD PRESSURE: 72 MMHG

## 2018-06-01 DIAGNOSIS — K08.89 TOOTHACHE: Primary | ICD-10-CM

## 2018-06-01 DIAGNOSIS — K04.7 DENTAL ABSCESS: ICD-10-CM

## 2018-06-01 PROCEDURE — 99283 EMERGENCY DEPT VISIT LOW MDM: CPT

## 2018-06-01 RX ORDER — NAPROXEN 375 MG/1
375 TABLET ORAL 2 TIMES DAILY WITH MEALS
Qty: 30 TABLET | Refills: 0 | Status: SHIPPED | OUTPATIENT
Start: 2018-06-01 | End: 2018-06-28

## 2018-06-01 RX ORDER — CLINDAMYCIN HYDROCHLORIDE 150 MG/1
450 CAPSULE ORAL EVERY 8 HOURS
Qty: 45 CAPSULE | Refills: 0 | Status: SHIPPED | OUTPATIENT
Start: 2018-06-01 | End: 2018-06-06

## 2018-06-01 NOTE — ED PROVIDER NOTES
SCRIBE #1 NOTE: I, Ruiz Jaylan, am scribing for, and in the presence of, Yvan Velázquez MD. I have scribed the entire note.      History      Chief Complaint   Patient presents with    Dental Pain     + dental pain        Review of patient's allergies indicates:  No Known Allergies     HPI   HPI    6/1/2018, 4:04 PM   History obtained from the patient      History of Present Illness: Janice Kerr is a 37 y.o. female patient who presents to the Emergency Department for an evaluation of dental pain which onset gradually today. Symptoms are constant and moderate in severity. Exacerbated by nothing and relieved by nothing. No associated sxs reported. Patient denies any fever, chills, N/V, drooling, jaw pain, facial swelling, neck pain/stiffness, and all other sxs at this time. No further complaints or concerns at this time.     Arrival mode: Personal vehicle    PCP: Camila Ruby MD       Past Medical History:  Past Medical History:   Diagnosis Date    Asthma        Past Surgical History:  Past Surgical History:   Procedure Laterality Date    BREAST LUMPECTOMY Right     HYSTERECTOMY      TUBAL LIGATION           Family History:  Family History   Problem Relation Age of Onset    Heart disease Mother     Hypertension Mother     Diabetes Mother        Social History:  Social History     Social History Main Topics    Smoking status: Current Some Day Smoker     Packs/day: 0.50     Types: Cigarettes    Smokeless tobacco: Never Used      Comment: no smoking after m.n prior to sx    Alcohol use No    Drug use: No    Sexual activity: Yes     Partners: Male       ROS   Review of Systems   Constitutional: Negative for chills and fever.   HENT: Positive for dental problem. Negative for congestion, facial swelling, sore throat and trouble swallowing.         (-) Jaw pain   Respiratory: Negative for cough, choking and shortness of breath.    Cardiovascular: Negative for chest pain.  "  Gastrointestinal: Negative for nausea and vomiting.   Genitourinary: Negative for dysuria and hematuria.   Musculoskeletal: Negative for back pain, neck pain and neck stiffness.   Skin: Negative for rash.   Neurological: Negative for dizziness, weakness and light-headedness.   Hematological: Does not bruise/bleed easily.     Physical Exam      Initial Vitals [06/01/18 1602]   BP Pulse Resp Temp SpO2   (!) 149/72 60 20 99 °F (37.2 °C) 100 %      MAP       97.67          Physical Exam  Nursing Notes and Vital Signs Reviewed.  Constitutional: Patient is in no acute distress. Well-developed and well-nourished.  Head: Atraumatic. Normocephalic.  Eyes: PERRL. EOM intact. Conjunctivae are not pale. No scleral icterus.  ENT: Mucous membranes are moist. Teeth #2&3 are decayed down to gums.   Neck: Supple. Full ROM. No lymphadenopathy.  Cardiovascular: Regular rate. Regular rhythm.  Pulmonary/Chest: No respiratory distress.  Abdominal: Soft and non-distended.   Musculoskeletal: Moves all extremities. No obvious deformities.  Skin: Warm and dry.  Neurological:  Alert, awake, and appropriate.  Normal speech.  No acute focal neurological deficits are appreciated.  Psychiatric: Normal affect. Good eye contact. Appropriate in content.    ED Course    Procedures  ED Vital Signs:  Vitals:    06/01/18 1602   BP: (!) 149/72   Pulse: 60   Resp: 20   Temp: 99 °F (37.2 °C)   TempSrc: Oral   SpO2: 100%   Weight: 95.3 kg (210 lb)   Height: 5' 7" (1.702 m)            The Emergency Provider reviewed the vital signs and test results, which are outlined above.    ED Discussion     4:07 PM: Reassessed pt at this time. Discussed with pt all pertinent ED information. Discussed pt dx and plan of tx. Gave pt all f/u and return to the ED instructions. All questions and concerns were addressed at this time. Pt expresses understanding of information and instructions, and is comfortable with plan to discharge. Pt is stable for discharge.    I " discussed with patient and/or family/caretaker that evaluation in the ED does not suggest any emergent or life threatening medical conditions requiring immediate intervention beyond what was provided in the ED, and I believe patient is safe for discharge.  Regardless, an unremarkable evaluation in the ED does not preclude the development or presence of a serious of life threatening condition. As such, patient was instructed to return immediately for any worsening or change in current symptoms.      ED Medication(s):  Medications - No data to display    Discharge Medication List as of 6/1/2018  4:06 PM      START taking these medications    Details   clindamycin (CLEOCIN) 150 MG capsule Take 3 capsules (450 mg total) by mouth every 8 (eight) hours., Starting Fri 6/1/2018, Until Wed 6/6/2018, Print      naproxen (NAPROSYN) 375 MG tablet Take 1 tablet (375 mg total) by mouth 2 (two) times daily with meals., Starting Fri 6/1/2018, Print             Follow-up Information     [facility] in 2 days.    Contact information:  [address] [facility] [address] [phone]                     Medical Decision Making              Scribe Attestation:   Scribe #1: I performed the above scribed service and the documentation accurately describes the services I performed. I attest to the accuracy of the note.    Attending:   Physician Attestation Statement for Scribe #1: I, Yvan Velázquez MD, personally performed the services described in this documentation, as scribed by Ruiz Tian, in my presence, and it is both accurate and complete.          Clinical Impression       ICD-10-CM ICD-9-CM   1. Toothache K08.89 525.9   2. Dental abscess K04.7 522.5       Disposition:   Disposition: Discharged  Condition: Stable         Yvan Velázquez MD  06/01/18 0610

## 2018-06-25 DIAGNOSIS — M25.572 LEFT ANKLE PAIN, UNSPECIFIED CHRONICITY: Primary | ICD-10-CM

## 2018-06-28 ENCOUNTER — OFFICE VISIT (OUTPATIENT)
Dept: ORTHOPEDICS | Facility: CLINIC | Age: 38
End: 2018-06-28
Payer: MEDICAID

## 2018-06-28 ENCOUNTER — HOSPITAL ENCOUNTER (OUTPATIENT)
Dept: RADIOLOGY | Facility: HOSPITAL | Age: 38
Discharge: HOME | End: 2018-06-28
Attending: ORTHOPAEDIC SURGERY
Payer: MEDICAID

## 2018-06-28 VITALS
HEIGHT: 67 IN | RESPIRATION RATE: 18 BRPM | TEMPERATURE: 98 F | WEIGHT: 210 LBS | SYSTOLIC BLOOD PRESSURE: 117 MMHG | DIASTOLIC BLOOD PRESSURE: 76 MMHG | BODY MASS INDEX: 32.96 KG/M2 | HEART RATE: 49 BPM

## 2018-06-28 DIAGNOSIS — Z98.890 POSTOPERATIVE STATE: Primary | ICD-10-CM

## 2018-06-28 DIAGNOSIS — M25.572 LEFT ANKLE PAIN, UNSPECIFIED CHRONICITY: ICD-10-CM

## 2018-06-28 PROCEDURE — 73610 X-RAY EXAM OF ANKLE: CPT | Mod: TC,FY,PO,LT

## 2018-06-28 PROCEDURE — 73610 X-RAY EXAM OF ANKLE: CPT | Mod: 26,LT,, | Performed by: RADIOLOGY

## 2018-06-28 PROCEDURE — 99213 OFFICE O/P EST LOW 20 MIN: CPT | Mod: PBBFAC,25,PO | Performed by: ORTHOPAEDIC SURGERY

## 2018-06-28 PROCEDURE — 99999 PR PBB SHADOW E&M-EST. PATIENT-LVL III: CPT | Mod: PBBFAC,,, | Performed by: ORTHOPAEDIC SURGERY

## 2018-06-28 PROCEDURE — 99024 POSTOP FOLLOW-UP VISIT: CPT | Mod: ,,, | Performed by: ORTHOPAEDIC SURGERY

## 2018-06-28 RX ORDER — NABUMETONE 500 MG/1
500 TABLET, FILM COATED ORAL DAILY
Qty: 30 TABLET | Refills: 2 | Status: SHIPPED | OUTPATIENT
Start: 2018-06-28 | End: 2019-04-08

## 2018-06-28 NOTE — PROGRESS NOTES
"SUBJECTIVE:  Patient is status post Left ankle hardware removal.  Patient complains of  2/ 10 pain that is better with the  pain meds and aggravated with movement.    Past Medical History:   Diagnosis Date    Asthma      Past Surgical History:   Procedure Laterality Date    BREAST LUMPECTOMY Right     HYSTERECTOMY      TUBAL LIGATION       Review of patient's allergies indicates:  No Known Allergies  Current Outpatient Prescriptions on File Prior to Visit   Medication Sig Dispense Refill    acetaminophen (TYLENOL) 500 MG tablet Take 500 mg by mouth every 6 (six) hours as needed for Pain.      albuterol 90 mcg/actuation inhaler Inhale 2 puffs into the lungs every 4 (four) hours as needed for Wheezing. 1 Inhaler 0    [DISCONTINUED] DULERA 100-5 mcg/actuation HFAA INHALE 1 PUFF PO BID  1    [DISCONTINUED] fluticasone (FLONASE) 50 mcg/actuation nasal spray USE 1 SPRAY IN EACH NOSTRIL BID. RINSE MOUTH AFTER EACH USE  1    [DISCONTINUED] hydrocodone-acetaminophen 5-325mg (NORCO) 5-325 mg per tablet Take 1 tablet by mouth every 6 (six) hours as needed for Pain. 30 tablet 0    [DISCONTINUED] medroxyPROGESTERone (DEPO-PROVERA) 150 mg/mL injection Inject 150 mg into the muscle every 3 (three) months.      [DISCONTINUED] naproxen (NAPROSYN) 375 MG tablet Take 1 tablet (375 mg total) by mouth 2 (two) times daily with meals. 30 tablet 0    [DISCONTINUED] promethazine-dextromethorphan (PROMETHAZINE-DM) 6.25-15 mg/5 mL Syrp TK 5 ML PO Q 6 H PRF COUGH. ANJU  0     No current facility-administered medications on file prior to visit.      /76   Pulse (!) 49   Temp 98.2 °F (36.8 °C)   Resp 18   Ht 5' 7" (1.702 m)   Wt 95.3 kg (210 lb)   LMP 02/02/2017   BMI 32.89 kg/m²    ROS:  GENERAL: No fever, chills, fatigability or weight loss.  SKIN: No rashes, itching or changes in color or texture of skin.  HEAD: No headaches or recent head trauma.  EYES: Visual acuity fine. No photophobia, ocular pain or " diplopia.  EARS: Denies ear pain, discharge or vertigo.  NOSE: No loss of smell, no epistaxis or postnasal drip.  MOUTH & THROAT: No hoarseness or change in voice. No excessive gum bleeding.  NODES: Denies swollen glands.  CHEST: Denies ARIZA, cyanosis, wheezing, cough and sputum production.  CARDIOVASCULAR: Denies chest pain, PND, orthopnea or reduced exercise tolerance.  ABDOMEN: Appetite fine. No weight loss. Denies diarrhea, abdominal pain, hematemesis or blood in stool.  URINARY: No flank pain, dysuria or hematuria.  PERIPHERAL VASCULAR: No claudication or cyanosis.  NEUROLOGIC: No history of seizures, paralysis, alteration of gait or coordination.  MUSCULOSKELETAL: See HPI    PE:  APPEARANCE: Well nourished, well developed, in no acute distress.   HEAD: Normocephalic, atraumatic.  EYES: PERRL. EOMI.   EARS: TM's intact. Light reflex normal. No retraction or perforation.   NOSE: Mucosa pink. Airway clear.  MOUTH & THROAT: No tonsillar enlargement. No pharyngeal erythema or exudate. No stridor.  NECK: Supple.   NODES: No cervical, axillary or inguinal lymph node enlargement.  CHEST: Lungs clear to auscultation.  CARDIOVASCULAR: Normal S1, S2. No rubs, murmurs or gallops.  ABDOMEN: Bowel sounds normal. Not distended. Soft. No tenderness or masses.  NEUROLOGIC: Cranial Nerves: II-XII grossly intact, also see MUSCULOSKELETAL  MUSCULOSKELETAL:        Left  ankle  -dressing intact, 2 plus dorsalis pedis and posterior tibial artery pulses, light touch intact Left lower extremity.  All digits are warm. No erythema, no warmth, no drainage, no swelling, no significant tenderness.  Less than 2 seconds capillary refill all digits.      ASSESSMENT:    The patient is stable and improving.  X-Ray Ankle Complete Left  Narrative: EXAMINATION:  XR ANKLE COMPLETE 3 VIEW LEFT    CLINICAL HISTORY:  Pain in left ankle and joints of left foot    TECHNIQUE:  AP, lateral and oblique views of the left ankle were  performed.    COMPARISON:  04/26/2018    FINDINGS:  Plate and screw fixation seen across the distal fibula.  Screw tract noted in the region of the syndesmosis.  The alignment is stable when compared to the prior exam.  No hardware failure or loosening.  Impression: As above    Electronically signed by: Kolton Perez DO  Date:    06/28/2018  Time:    11:12        PLAN:  Follow-up in 6 months  Continue physical therapy  No Follow-up on file.

## 2018-06-28 NOTE — PATIENT INSTRUCTIONS
ACE Wrap  Minor muscle or joint injuries are often treated with an elastic bandage. The bandage provides support and compression to the injured area. An elastic bandage is a stretchy, rolled bandage. Elastic bandages range in width from 2 to 6 inches. They can be used for a variety of injuries. The bandages are often called ACE bandages, after the most common brand name.  If used correctly, elastic bandages help control swelling and ease pain. An elastic bandage is also a good reminder not to overuse the injured area. However, elastic bandages do not provide a lot of support and will not prevent reinjury.  Home care    To apply an elastic bandage:  · Check the skin before wrapping the injury. It should be clean, dry, and free of drainage.  · Start wrapping below the injury and work your way toward the body. For an ankle sprain, start wrapping around the foot and work up toward the calf. This will help control swelling.  · Overlap the edges of the bandage so it stays snuggly in place.  · Wrap the bandage firmly, but not too tightly. A tight bandage can increase swelling on either end of the bandage. Make sure the bandage is wrinkle free.  · Leave fingers and toes exposed.  · Secure ends of the bandage (even self-sticking ones) with clips or tape.  · Check frequently to ensure adequate circulation, especially in the fingers and toes. Loosen the bandage if there is local swelling, numbness, tingling, discomfort, coldness, or discoloration (skin pale or bluish in color).  · Rewrap the bandage as needed during the day. Reroll the bandage as you unwind it.  Continue using the elastic bandage until the pain and swelling are gone or as your healthcare provider advises.  If you have been told to ice the area, the ice can be secured in place with the elastic bandage. Wrap the ice pack with a thin towel to protect the skin. Do not put ice or an ice pack directly on the skin.  Ice the area for no more than 20 minutes at a  time.    Follow-up care  Follow up with your healthcare provider, as advised.  When to seek medical advice  Call your healthcare provider for any of the following:  · Pain and swelling that doesn't get better or gets worse  · Trouble moving injured area  · Skin discoloration, numbness, or tingling that doesnt go away after bandage is removed  Date Last Reviewed: 9/13/2015  © 1760-2928 [employer]. [address] [place] [address], [place] [address]. All rights reserved. This information is not intended as a substitute for professional medical care. Always follow your healthcare professional's instructions.

## 2018-07-25 ENCOUNTER — HOSPITAL ENCOUNTER (EMERGENCY)
Facility: HOSPITAL | Age: 38
Discharge: HOME | End: 2018-07-25
Attending: EMERGENCY MEDICINE
Payer: MEDICAID

## 2018-07-25 VITALS
OXYGEN SATURATION: 97 % | BODY MASS INDEX: 32.96 KG/M2 | DIASTOLIC BLOOD PRESSURE: 78 MMHG | RESPIRATION RATE: 18 BRPM | WEIGHT: 210 LBS | HEART RATE: 78 BPM | HEIGHT: 67 IN | TEMPERATURE: 99 F | SYSTOLIC BLOOD PRESSURE: 127 MMHG

## 2018-07-25 DIAGNOSIS — S09.93XA DENTAL INJURY, INITIAL ENCOUNTER: Primary | ICD-10-CM

## 2018-07-25 DIAGNOSIS — K04.7 DENTAL ABSCESS: ICD-10-CM

## 2018-07-25 DIAGNOSIS — K08.9 POOR DENTITION: ICD-10-CM

## 2018-07-25 DIAGNOSIS — K08.89 PAIN, DENTAL: ICD-10-CM

## 2018-07-25 PROCEDURE — 25000003 PHARM REV CODE 250: Performed by: NURSE PRACTITIONER

## 2018-07-25 PROCEDURE — 99283 EMERGENCY DEPT VISIT LOW MDM: CPT

## 2018-07-25 RX ORDER — HYDROCODONE BITARTRATE AND ACETAMINOPHEN 10; 325 MG/1; MG/1
1 TABLET ORAL
Status: COMPLETED | OUTPATIENT
Start: 2018-07-25 | End: 2018-07-25

## 2018-07-25 RX ORDER — DICLOFENAC SODIUM 50 MG/1
50 TABLET, DELAYED RELEASE ORAL 3 TIMES DAILY PRN
Qty: 20 TABLET | Refills: 0 | Status: SHIPPED | OUTPATIENT
Start: 2018-07-25 | End: 2019-04-08

## 2018-07-25 RX ORDER — AMOXICILLIN AND CLAVULANATE POTASSIUM 875; 125 MG/1; MG/1
1 TABLET, FILM COATED ORAL 2 TIMES DAILY
Qty: 14 TABLET | Refills: 0 | Status: SHIPPED | OUTPATIENT
Start: 2018-07-25 | End: 2019-04-08

## 2018-07-25 RX ORDER — AMOXICILLIN AND CLAVULANATE POTASSIUM 875; 125 MG/1; MG/1
1 TABLET, FILM COATED ORAL
Status: COMPLETED | OUTPATIENT
Start: 2018-07-25 | End: 2018-07-25

## 2018-07-25 RX ADMIN — HYDROCODONE BITARTRATE AND ACETAMINOPHEN 1 TABLET: 10; 325 TABLET ORAL at 10:07

## 2018-07-25 RX ADMIN — AMOXICILLIN AND CLAVULANATE POTASSIUM 1 TABLET: 875; 125 TABLET, FILM COATED ORAL at 10:07

## 2018-07-26 NOTE — ED PROVIDER NOTES
SCRIBE #1 NOTE: I, Janae Sweet, am scribing for, and in the presence of, Victor Manuel Santos NP. I have scribed the entire note.      History      Chief Complaint   Patient presents with    Oral Swelling     R sided swelling to face.        Review of patient's allergies indicates:  No Known Allergies     HPI   HPI    7/25/2018, 10:33 PM   History obtained from the patient      History of Present Illness: Janice Kerr is a 37 y.o. female patient who presents to the Emergency Department for oral swelling x3 days. Pt is currently c/o R sided facial swelling. Symptoms are constant and moderate in severity. No mitigating or exacerbating factors reported. Patient denies any fever, chills, difficulty swallowing, SOB, drooling, jaw pain, N/V and all other sxs at this time. Prior Tx includes applying warm compresses. No further complaints or concerns at this time.       Arrival mode: Personal vehicle    PCP: Camila Ruby MD       Past Medical History:  Past Medical History:   Diagnosis Date    Asthma        Past Surgical History:  Past Surgical History:   Procedure Laterality Date    BREAST LUMPECTOMY Right     HYSTERECTOMY      TUBAL LIGATION           Family History:  Family History   Problem Relation Age of Onset    Heart disease Mother     Hypertension Mother     Diabetes Mother        Social History:  Social History     Social History Main Topics    Smoking status: Current Some Day Smoker     Packs/day: 0.50     Types: Cigarettes    Smokeless tobacco: Never Used      Comment: no smoking after m.n prior to sx    Alcohol use No    Drug use: No    Sexual activity: Yes     Partners: Male       ROS   Review of Systems   Constitutional: Negative for chills, diaphoresis, fatigue and fever.   HENT: Positive for facial swelling (R sided). Negative for congestion, drooling, sore throat and trouble swallowing.         (-) jaw pain   Respiratory: Negative for cough and shortness of breath.     Cardiovascular: Negative for chest pain, palpitations and leg swelling.   Gastrointestinal: Negative for abdominal pain, diarrhea, nausea and vomiting.   Genitourinary: Negative for dysuria, frequency, hematuria, urgency and vaginal pain.   Musculoskeletal: Negative for arthralgias, back pain, myalgias and neck pain.   Skin: Negative for rash.   Neurological: Negative for dizziness, weakness, light-headedness, numbness and headaches.   Hematological: Does not bruise/bleed easily.   All other systems reviewed and are negative.      Physical Exam      Initial Vitals [07/25/18 2204]   BP Pulse Resp Temp SpO2   127/78 78 18 98.7 °F (37.1 °C) 97 %      MAP       --          Physical Exam  Nursing Notes and Vital Signs Reviewed.  Constitutional: Patient is in no acute distress. Well-developed and well-nourished.  Head: Atraumatic. Normocephalic.  Eyes: PERRL. EOM intact. Conjunctivae are not pale. No scleral icterus.  ENT: Mucous membranes are moist. Oropharynx is clear and symmetric.   Mouth/Throat: R sided facial swelling. Poor dentition throughout. Tooth number 3 and 4 are decayed, chipped and abscessed. No trismus.    Neck: Supple. Full ROM. No lymphadenopathy.  Cardiovascular: Regular rate. Regular rhythm. No murmurs, rubs, or gallops. Distal pulses are 2+ and symmetric.  Pulmonary/Chest: No respiratory distress. Clear to auscultation bilaterally. No wheezing or rales.  Abdominal: Soft and non-distended.  There is no tenderness.  No rebound, guarding, or rigidity. Good bowel sounds.  Musculoskeletal: Moves all extremities. No obvious deformities. No edema. No calf tenderness.  Skin: Warm and dry.  Neurological:  Alert, awake, and appropriate.  Normal speech.  No acute focal neurological deficits are appreciated.  Psychiatric: Normal affect. Good eye contact. Appropriate in content.    ED Course    Procedures  ED Vital Signs:  Vitals:    07/25/18 2204   BP: 127/78   Pulse: 78   Resp: 18   Temp: 98.7 °F (37.1 °C)  "  TempSrc: Oral   SpO2: 97%   Weight: 95.3 kg (210 lb)   Height: 5' 7" (1.702 m)       Abnormal Lab Results:  Labs Reviewed - No data to display     All Lab Results:  None    Imaging Results:  Imaging Results    None                 The Emergency Provider reviewed the vital signs and test results, which are outlined above.    ED Discussion     10:45 PM: Reassessed pt at this time. Discussed with pt all pertinent ED information and results. Discussed pt dx and plan of tx. Gave pt all f/u and return to the ED instructions. All questions and concerns were addressed at this time. Pt expresses understanding of information and instructions, and is comfortable with plan to discharge. Pt is stable for discharge.    I discussed with patient and/or family/caretaker that evaluation in the ED does not suggest any emergent or life threatening medical conditions requiring immediate intervention beyond what was provided in the ED, and I believe patient is safe for discharge.  Regardless, an unremarkable evaluation in the ED does not preclude the development or presence of a serious of life threatening condition. As such, patient was instructed to return immediately for any worsening or change in current symptoms.      ED Medication(s):  Medications   amoxicillin-clavulanate 875-125mg per tablet 1 tablet (1 tablet Oral Given 7/25/18 0175)   HYDROcodone-acetaminophen  mg per tablet 1 tablet (1 tablet Oral Given 7/25/18 0030)       Discharge Medication List as of 7/25/2018 10:40 PM      START taking these medications    Details   amoxicillin-clavulanate 875-125mg (AUGMENTIN) 875-125 mg per tablet Take 1 tablet by mouth 2 (two) times daily., Starting Wed 7/25/2018, Print      diclofenac (VOLTAREN) 50 MG EC tablet Take 1 tablet (50 mg total) by mouth 3 (three) times daily as needed., Starting Wed 7/25/2018, Print             Follow-up Information     Camila Ruby MD. Schedule an appointment as soon as possible for a " visit in 2 days.    Specialty:  Internal Medicine  Contact information:  [address] [place] [address] [address] Mikayla ROCHE [address]  [phone]             Schedule an appointment as soon as possible for a visit  with Dentist.           [facility] - .    Specialty:  Emergency Medicine  Why:  As needed, If symptoms worsen  Contact information:  [id number] [place] [address] [facility] [id number] [phone]                   Medical Decision Making              Scribe Attestation:   Scribe #1: I performed the above scribed service and the documentation accurately describes the services I performed. I attest to the accuracy of the note.    Attending:   Physician Attestation Statement for Scribe #1: I, Victor Manuel Santos NP, personally performed the services described in this documentation, as scribed by Janae Sweet, in my presence, and it is both accurate and complete.          Clinical Impression       ICD-10-CM ICD-9-CM   1. Dental injury, initial encounter S09.93XA 873.63   2. Dental abscess K04.7 522.5   3. Pain, dental K08.89 525.9   4. Poor dentition K08.9 525.9       Disposition:   Disposition: Discharged  Condition: Stable         Victor Manuel Santos NP  07/25/18 6069

## 2019-04-08 ENCOUNTER — HOSPITAL ENCOUNTER (EMERGENCY)
Facility: HOSPITAL | Age: 39
Discharge: HOME | End: 2019-04-08
Attending: EMERGENCY MEDICINE

## 2019-04-08 VITALS
RESPIRATION RATE: 19 BRPM | BODY MASS INDEX: 27.58 KG/M2 | SYSTOLIC BLOOD PRESSURE: 129 MMHG | HEART RATE: 92 BPM | HEIGHT: 68 IN | WEIGHT: 182 LBS | TEMPERATURE: 99 F | OXYGEN SATURATION: 100 % | DIASTOLIC BLOOD PRESSURE: 69 MMHG

## 2019-04-08 DIAGNOSIS — M54.16 LUMBAR RADICULOPATHY: Primary | ICD-10-CM

## 2019-04-08 PROCEDURE — 96372 THER/PROPH/DIAG INJ SC/IM: CPT

## 2019-04-08 PROCEDURE — 63600175 PHARM REV CODE 636 W HCPCS: Performed by: REGISTERED NURSE

## 2019-04-08 PROCEDURE — 99284 EMERGENCY DEPT VISIT MOD MDM: CPT | Mod: 25

## 2019-04-08 PROCEDURE — 25000003 PHARM REV CODE 250: Performed by: REGISTERED NURSE

## 2019-04-08 RX ORDER — METHYLPREDNISOLONE 4 MG/1
TABLET ORAL
Qty: 1 PACKAGE | Refills: 0 | Status: SHIPPED | OUTPATIENT
Start: 2019-04-08 | End: 2019-04-29

## 2019-04-08 RX ORDER — CYCLOBENZAPRINE HCL 10 MG
10 TABLET ORAL
Status: COMPLETED | OUTPATIENT
Start: 2019-04-08 | End: 2019-04-08

## 2019-04-08 RX ORDER — HYDROCODONE BITARTRATE AND ACETAMINOPHEN 10; 325 MG/1; MG/1
1 TABLET ORAL
Status: COMPLETED | OUTPATIENT
Start: 2019-04-08 | End: 2019-04-08

## 2019-04-08 RX ORDER — TRAMADOL HYDROCHLORIDE 50 MG/1
50 TABLET ORAL EVERY 6 HOURS PRN
Qty: 12 TABLET | Refills: 0 | OUTPATIENT
Start: 2019-04-08 | End: 2019-06-03

## 2019-04-08 RX ORDER — DEXAMETHASONE SODIUM PHOSPHATE 4 MG/ML
8 INJECTION, SOLUTION INTRA-ARTICULAR; INTRALESIONAL; INTRAMUSCULAR; INTRAVENOUS; SOFT TISSUE
Status: COMPLETED | OUTPATIENT
Start: 2019-04-08 | End: 2019-04-08

## 2019-04-08 RX ORDER — CYCLOBENZAPRINE HCL 10 MG
10 TABLET ORAL 3 TIMES DAILY PRN
Qty: 15 TABLET | Refills: 0 | Status: SHIPPED | OUTPATIENT
Start: 2019-04-08 | End: 2019-04-13

## 2019-04-08 RX ADMIN — HYDROCODONE BITARTRATE AND ACETAMINOPHEN 1 TABLET: 10; 325 TABLET ORAL at 12:04

## 2019-04-08 RX ADMIN — DEXAMETHASONE SODIUM PHOSPHATE 8 MG: 4 INJECTION, SOLUTION INTRAMUSCULAR; INTRAVENOUS at 12:04

## 2019-04-08 RX ADMIN — CYCLOBENZAPRINE HYDROCHLORIDE 10 MG: 10 TABLET, FILM COATED ORAL at 12:04

## 2019-04-08 NOTE — ED PROVIDER NOTES
"     HISTORY     Chief Complaint   Patient presents with    Sciatica     'i cant stand up straight with this pain" pt denies injury or falls. pt reports hx of sciatica. no relief with tyenol or mobic at home. pt reports lower back pain that moves into right leg     Review of patient's allergies indicates:  No Known Allergies     HPI   The history is provided by the patient.   Back Pain    This is a new problem. The current episode started yesterday. The problem occurs constantly. The problem has been unchanged. The pain is associated with no known injury. The pain is present in the lumbar spine. The quality of the pain is described as shooting. The pain radiates to the right leg. The pain is at a severity of 8/10. The symptoms are aggravated by bending, certain positions and twisting. The pain is the same all the time. Pertinent negatives include no chest pain, no fever, no numbness, no bowel incontinence, no perianal numbness, no bladder incontinence, no dysuria and no weakness. She has tried heat and NSAIDs for the symptoms. The treatment provided mild relief.        PCP: Camila Ruby MD     Past Medical History:  Past Medical History:   Diagnosis Date    Asthma         Past Surgical History:  Past Surgical History:   Procedure Laterality Date    BREAST LUMPECTOMY Right     HYSTERECTOMY      LEG SURGERY Left 2018    fracture repair    OPEN REDUCTION INTERNAL FIXATION (ORIF) Left 9/29/2017    [facility] Sr[facility]    REMOVAL-HARDWARE-ANKLE Left 4/11/2018    [facility] at [facility] OR    TUBAL LIGATION          Family History:  Family History   Problem Relation Age of Onset    Heart disease Mother     Hypertension Mother     Diabetes Mother         Social History:  Social History     Tobacco Use    Smoking status: Current Every Day Smoker     Packs/day: 1.00     Types: Cigarettes    Smokeless tobacco: Never Used    Tobacco comment: no smoking " after m.n prior to sx   Substance and Sexual Activity    Alcohol use: No    Drug use: No    Sexual activity: Yes     Partners: Male         ROS   Review of Systems   Constitutional: Negative for fever.   HENT: Negative for sore throat.    Respiratory: Negative for shortness of breath.    Cardiovascular: Negative for chest pain.   Gastrointestinal: Negative for bowel incontinence and nausea.   Genitourinary: Negative for bladder incontinence and dysuria.   Musculoskeletal: Positive for back pain.   Skin: Negative for rash.   Neurological: Negative for weakness and numbness.   Hematological: Does not bruise/bleed easily.       PHYSICAL EXAM     Initial Vitals [04/08/19 1057]   BP Pulse Resp Temp SpO2   129/69 92 19 98.8 °F (37.1 °C) 100 %      MAP       --           Physical Exam    Constitutional: She appears well-developed and well-nourished. She is not diaphoretic. No distress.   HENT:   Head: Normocephalic and atraumatic.   Eyes: Conjunctivae and EOM are normal. Pupils are equal, round, and reactive to light.   Neck: Normal range of motion. Neck supple.   Cardiovascular: Normal rate, regular rhythm and normal heart sounds.   No murmur heard.  Pulmonary/Chest: Breath sounds normal. No respiratory distress. She has no wheezes. She has no rales.   Abdominal: Soft. Bowel sounds are normal. There is no tenderness. There is no rebound and no guarding.   Musculoskeletal: She exhibits no edema.        Lumbar back: She exhibits decreased range of motion and tenderness. She exhibits no bony tenderness.   No step off or deformities, neg SLR   Neurological: She is alert and oriented to person, place, and time. No cranial nerve deficit. GCS score is 15. GCS eye subscore is 4. GCS verbal subscore is 5. GCS motor subscore is 6.   Skin: Skin is warm and dry. Capillary refill takes less than 2 seconds.   Psychiatric: She has a normal mood and affect. Thought content normal.          ED COURSE   Procedures  ED ONGOING  "VITALS:  Vitals:    04/08/19 1057   BP: 129/69   Pulse: 92   Resp: 19   Temp: 98.8 °F (37.1 °C)   TempSrc: Oral   SpO2: 100%   Weight: 82.6 kg (182 lb)   Height: 5' 8" (1.727 m)         ABNORMAL LAB VALUES:  Labs Reviewed - No data to display      ALL LAB VALUES:        RADIOLOGY STUDIES:  Imaging Results    None                   The above vital signs and test results have been reviewed by the emergency provider.     ED Medications:  Medications   cyclobenzaprine tablet 10 mg (10 mg Oral Given 4/8/19 1207)   HYDROcodone-acetaminophen  mg per tablet 1 tablet (1 tablet Oral Given 4/8/19 1207)   dexamethasone injection 8 mg (8 mg Intramuscular Given 4/8/19 1208)       Current Discharge Medication List        Discharge Medications:  Discharge Medication List as of 4/8/2019 11:47 AM      START taking these medications    Details   cyclobenzaprine (FLEXERIL) 10 MG tablet Take 1 tablet (10 mg total) by mouth 3 (three) times daily as needed for Muscle spasms., Starting Mon 4/8/2019, Until Sat 4/13/2019, Print      methylPREDNISolone (MEDROL DOSEPACK) 4 mg tablet Take as directed, Print      traMADol (ULTRAM) 50 mg tablet Take 1 tablet (50 mg total) by mouth every 6 (six) hours as needed for Pain., Starting Mon 4/8/2019, Print            Follow-up Information     Primary care In 1 week.               11:47 AM: Reassessed pt at this time.  Pt states her condition has improved at this time. Discussed with pt all pertinent ED information and results. Discussed pt dx and plan of tx. Gave pt all f/u and return to the ED instructions. All questions and concerns were addressed at this time. Pt expresses understanding of information and instructions, and is comfortable with plan to discharge. Pt is stable for discharge.       I discussed with patient and/or family/caretaker that evaluation in the ED does not suggest any emergent or life threatening medical conditions requiring immediate intervention beyond what was provided " in the ED, and I believe patient is safe for discharge. Regardless, an unremarkable evaluation in the ED does not preclude the development or presence of a serious or life threatening condition. As such, patient was instructed to return immediately for any worsening or change in current symptoms.        MEDICAL DECISION MAKING                 CLINICAL IMPRESSION       ICD-10-CM ICD-9-CM   1. Lumbar radiculopathy M54.16 724.4               Juan C Villalba Jr., [facility]  04/09/19 1334

## 2019-05-13 ENCOUNTER — TELEPHONE (OUTPATIENT)
Dept: ORTHOPEDICS | Facility: CLINIC | Age: 39
End: 2019-05-13

## 2019-06-03 ENCOUNTER — HOSPITAL ENCOUNTER (EMERGENCY)
Facility: HOSPITAL | Age: 39
Discharge: HOME | End: 2019-06-03
Attending: EMERGENCY MEDICINE

## 2019-06-03 VITALS
OXYGEN SATURATION: 99 % | HEART RATE: 52 BPM | BODY MASS INDEX: 33.27 KG/M2 | RESPIRATION RATE: 16 BRPM | TEMPERATURE: 99 F | DIASTOLIC BLOOD PRESSURE: 71 MMHG | HEIGHT: 67 IN | SYSTOLIC BLOOD PRESSURE: 120 MMHG | WEIGHT: 212 LBS

## 2019-06-03 DIAGNOSIS — K08.89 PAIN, DENTAL: Primary | ICD-10-CM

## 2019-06-03 DIAGNOSIS — K04.7 DENTAL ABSCESS: ICD-10-CM

## 2019-06-03 PROCEDURE — 99284 EMERGENCY DEPT VISIT MOD MDM: CPT

## 2019-06-03 RX ORDER — IBUPROFEN 800 MG/1
800 TABLET ORAL EVERY 6 HOURS PRN
Qty: 20 TABLET | Refills: 0 | Status: SHIPPED | OUTPATIENT
Start: 2019-06-03

## 2019-06-03 RX ORDER — CLINDAMYCIN HYDROCHLORIDE 300 MG/1
300 CAPSULE ORAL EVERY 6 HOURS
Qty: 28 CAPSULE | Refills: 0 | Status: SHIPPED | OUTPATIENT
Start: 2019-06-03 | End: 2019-06-10

## 2019-06-03 RX ORDER — TRAMADOL HYDROCHLORIDE 50 MG/1
50 TABLET ORAL EVERY 6 HOURS PRN
Qty: 12 TABLET | Refills: 0 | Status: SHIPPED | OUTPATIENT
Start: 2019-06-03

## 2019-06-03 NOTE — ED PROVIDER NOTES
HISTORY     Chief Complaint   Patient presents with    Dental Problem     right upper dental abscess and sore throat     Review of patient's allergies indicates:  No Known Allergies     HPI   Pt is a 37 y/o AAF with history of a broken tooth and previous dental abscesses. She broke her right upper molar 2 years ago eating candy and has had infections since. Has not had the tooth repaired/replaced due to cost. The past several days she has noted spitting up purulent material. She also complains of a mild sore throat while swallowing. She has been taking Tylenol for the discomfort which has not provided much relief. Still reporting pain as 10/10.    The history is provided by the patient.   Dental Pain   The primary symptoms include mouth pain, dental injury (2 years ago) and sore throat. Primary symptoms do not include fever, shortness of breath or cough. The symptoms are worsening. The symptoms are new. The symptoms occur constantly.   Mouth pain began 3 - 5 days ago. Affected locations include: teeth and gum(s). At its highest the mouth pain was at 10/10. The mouth pain is currently at 10/10.   The dental injury occurred more than 1 week ago. Affected teeth include: 1/right upper third molar. The injury is a fracture.   Additional symptoms include: gum swelling, gum tenderness, purulent gums, facial swelling and pain with swallowing. Additional symptoms do not include: ear pain and swollen glands. Medical issues include: smoking and periodontal disease.        PCP: Camila Ruby MD     Past Medical History:  Past Medical History:   Diagnosis Date    Asthma         Past Surgical History:  Past Surgical History:   Procedure Laterality Date    BREAST LUMPECTOMY Right     HYSTERECTOMY      LEG SURGERY Left 2018    fracture repair    OPEN REDUCTION INTERNAL FIXATION (ORIF) Left 9/29/2017    Performed by [facility] Sr[facility] at [facility] OR    REMOVAL-HARDWARE-ANKLE Left 4/11/2018    [facility]    TUBAL LIGATION          Family History:  Family History   Problem Relation Age of Onset    Heart disease Mother     Hypertension Mother     Diabetes Mother         Social History:  Social History     Tobacco Use    Smoking status: Current Every Day Smoker     Packs/day: 1.00     Types: Cigarettes    Smokeless tobacco: Never Used    Tobacco comment: no smoking after m.n prior to sx   Substance and Sexual Activity    Alcohol use: No    Drug use: No    Sexual activity: Yes     Partners: Male         ROS   Review of Systems   Constitutional: Negative for fever.   HENT: Positive for facial swelling and sore throat. Negative for ear pain.    Respiratory: Negative for cough and shortness of breath.    Cardiovascular: Negative for chest pain.   Gastrointestinal: Negative for nausea and vomiting.   Genitourinary: Negative for dysuria.   Musculoskeletal: Negative for back pain.   Skin: Negative for rash.   Neurological: Negative for weakness.   Hematological: Does not bruise/bleed easily.       PHYSICAL EXAM     Initial Vitals [06/03/19 1334]   BP Pulse Resp Temp SpO2   120/71 (!) 52 16 99.2 °F (37.3 °C) 99 %      MAP       --           Physical Exam    Constitutional: She appears well-developed and well-nourished. She is not diaphoretic. No distress.   HENT:   Head: Normocephalic and atraumatic.   Mouth/Throat: Broken right upper molar with edema. Mild right sided facial swelling. Patient handles secretions normally. Positive for dental caries. Positive for gingival edema. No palpable fluctuance. No evidence of periodontal or periapical abscess. Can open mouth wide, but voluntarily holds as closed as possible while speaking.    Eyes: Conjunctivae and EOM are normal. Pupils are equal, round, and reactive to light.   Neck: Normal range of motion. Neck supple.   Cardiovascular: Normal rate, regular rhythm and normal heart sounds.   No murmur heard.  Pulmonary/Chest: Breath sounds  "normal. No respiratory distress. She has no wheezes. She has no rales.   Abdominal: Soft. Bowel sounds are normal. There is no tenderness. There is no rebound and no guarding.   Musculoskeletal: Normal range of motion. She exhibits no edema or tenderness.   Neurological: She is alert and oriented to person, place, and time. No cranial nerve deficit. GCS score is 15. GCS eye subscore is 4. GCS verbal subscore is 5. GCS motor subscore is 6.   Skin: Skin is warm and dry. Capillary refill takes less than 2 seconds.   Psychiatric: She has a normal mood and affect. Thought content normal.          ED COURSE   Procedures  ED ONGOING VITALS:  Vitals:    06/03/19 1334   BP: 120/71   Pulse: (!) 52   Resp: 16   Temp: 99.2 °F (37.3 °C)   TempSrc: Oral   SpO2: 99%   Weight: 96.2 kg (211 lb 15.6 oz)   Height: 5' 7" (1.702 m)         ABNORMAL LAB VALUES:  Labs Reviewed   HIV 1 / 2 ANTIBODY         ALL LAB VALUES:      RADIOLOGY STUDIES:  Imaging Results    None                   The above vital signs and test results have been reviewed by the emergency provider.     ED Medications:  Current Discharge Medication List        Discharge Medications:  New Prescriptions    CLINDAMYCIN (CLEOCIN) 300 MG CAPSULE    Take 1 capsule (300 mg total) by mouth every 6 (six) hours. for 7 days    IBUPROFEN (ADVIL,MOTRIN) 800 MG TABLET    Take 1 tablet (800 mg total) by mouth every 6 (six) hours as needed for Pain.    TRAMADOL (ULTRAM) 50 MG TABLET    Take 1 tablet (50 mg total) by mouth every 6 (six) hours as needed for Pain.      Follow-up Information     Schedule an appointment as soon as possible for a visit  with [facility] - Mikayla Thompson.    Contact information:  [address] [place] [address] [phone]                  1:50 PM: Discussed pt dx and plan of tx. Gave pt all f/u and return to the ED instructions. All questions and concerns were addressed at this time. Pt expresses understanding of information and " instructions, and is comfortable with plan to discharge. Pt is stable for discharge.      I discussed with patient and/or family/caretaker that evaluation in the ED does not suggest any emergent or life threatening medical conditions requiring immediate intervention beyond what was provided in the ED, and I believe patient is safe for discharge. Regardless, an unremarkable evaluation in the ED does not preclude the development or presence of a serious or life threatening condition. As such, patient was instructed to return immediately for any worsening or change in current symptoms.      MEDICAL DECISION MAKING                 CLINICAL IMPRESSION       ICD-10-CM ICD-9-CM   1. Pain, dental K08.89 525.9   2. Dental abscess K04.7 522.5               Juan C Villalba Jr., [facility]  06/03/19 1514

## 2019-12-23 ENCOUNTER — HOSPITAL ENCOUNTER (EMERGENCY)
Facility: HOSPITAL | Age: 39
Discharge: HOME | End: 2019-12-23
Attending: EMERGENCY MEDICINE
Payer: COMMERCIAL

## 2019-12-23 VITALS
HEART RATE: 91 BPM | RESPIRATION RATE: 18 BRPM | HEIGHT: 67 IN | BODY MASS INDEX: 32.53 KG/M2 | DIASTOLIC BLOOD PRESSURE: 92 MMHG | SYSTOLIC BLOOD PRESSURE: 124 MMHG | TEMPERATURE: 99 F | WEIGHT: 207.25 LBS | OXYGEN SATURATION: 99 %

## 2019-12-23 DIAGNOSIS — J06.9 VIRAL URI WITH COUGH: Primary | ICD-10-CM

## 2019-12-23 DIAGNOSIS — J02.9 SORE THROAT: ICD-10-CM

## 2019-12-23 LAB
B-HCG UR QL: NEGATIVE
BACTERIA #/AREA URNS HPF: NORMAL /HPF
BILIRUB UR QL STRIP: NEGATIVE
CLARITY UR: CLEAR
COLOR UR: YELLOW
DEPRECATED S PYO AG THROAT QL EIA: NEGATIVE
GLUCOSE UR QL STRIP: NEGATIVE
HGB UR QL STRIP: ABNORMAL
HIV 1+2 AB+HIV1 P24 AG SERPL QL IA: NEGATIVE
INFLUENZA A, MOLECULAR: NEGATIVE
INFLUENZA B, MOLECULAR: NEGATIVE
KETONES UR QL STRIP: NEGATIVE
LEUKOCYTE ESTERASE UR QL STRIP: NEGATIVE
MICROSCOPIC COMMENT: NORMAL
NITRITE UR QL STRIP: NEGATIVE
PH UR STRIP: 7 [PH] (ref 5–8)
PROT UR QL STRIP: NEGATIVE
RBC #/AREA URNS HPF: 1 /HPF (ref 0–4)
SP GR UR STRIP: 1.01 (ref 1–1.03)
SPECIMEN SOURCE: NORMAL
URN SPEC COLLECT METH UR: ABNORMAL
UROBILINOGEN UR STRIP-ACNC: NEGATIVE EU/DL

## 2019-12-23 PROCEDURE — 81025 URINE PREGNANCY TEST: CPT

## 2019-12-23 PROCEDURE — 87880 STREP A ASSAY W/OPTIC: CPT

## 2019-12-23 PROCEDURE — 99284 EMERGENCY DEPT VISIT MOD MDM: CPT | Mod: 25

## 2019-12-23 PROCEDURE — 81000 URINALYSIS NONAUTO W/SCOPE: CPT

## 2019-12-23 PROCEDURE — 87502 INFLUENZA DNA AMP PROBE: CPT

## 2019-12-23 PROCEDURE — 96372 THER/PROPH/DIAG INJ SC/IM: CPT

## 2019-12-23 PROCEDURE — 63600175 PHARM REV CODE 636 W HCPCS: Performed by: NURSE PRACTITIONER

## 2019-12-23 PROCEDURE — 86703 HIV-1/HIV-2 1 RESULT ANTBDY: CPT

## 2019-12-23 PROCEDURE — 87081 CULTURE SCREEN ONLY: CPT

## 2019-12-23 RX ORDER — GUAIFENESIN/DEXTROMETHORPHAN 100-10MG/5
5 SYRUP ORAL EVERY 6 HOURS PRN
Qty: 120 ML | Refills: 0 | Status: SHIPPED | OUTPATIENT
Start: 2019-12-23

## 2019-12-23 RX ORDER — DEXAMETHASONE SODIUM PHOSPHATE 4 MG/ML
4 INJECTION, SOLUTION INTRA-ARTICULAR; INTRALESIONAL; INTRAMUSCULAR; INTRAVENOUS; SOFT TISSUE
Status: COMPLETED | OUTPATIENT
Start: 2019-12-23 | End: 2019-12-23

## 2019-12-23 RX ADMIN — DEXAMETHASONE SODIUM PHOSPHATE 4 MG: 4 INJECTION, SOLUTION INTRA-ARTICULAR; INTRALESIONAL; INTRAMUSCULAR; INTRAVENOUS; SOFT TISSUE at 06:12

## 2019-12-23 NOTE — ED PROVIDER NOTES
SCRIBE #1 NOTE: I, Hood Oneil, am scribing for, and in the presence of, Vazquez Constantino NP. I have scribed the entire note.       History     Chief Complaint   Patient presents with    Sore Throat     sore throat, bilateral ear pain, cough x 3 days     Review of patient's allergies indicates:  No Known Allergies      History of Present Illness     HPI    12/23/2019, 4:08 PM  History obtained from the patient      History of Present Illness: Janice Kerr is a 39 y.o. female patient with a PMHx of asthma, who presents to the Emergency Department for evaluation of sore throat which onset gradually. Symptoms are constant and moderate in severity. No mitigating or exacerbating factors reported. Associated sxs include sore neck, ear pain, posterior HA, cough. Patient denies any fever, chills, cough, n/v/d, CP, SOB, syncope, weakness, abdominal pain, light-headedness, numbness, and all other sxs at this time. Pt denies any sick contact. No further complaints or concerns at this time.         Arrival mode: Personal vehicle     PCP: Camila Ruby MD        Past Medical History:  Past Medical History:   Diagnosis Date    Asthma        Past Surgical History:  Past Surgical History:   Procedure Laterality Date    BREAST LUMPECTOMY Right     HYSTERECTOMY      LEG SURGERY Left 2018    fracture repair    TUBAL LIGATION           Family History:  Family History   Problem Relation Age of Onset    Heart disease Mother     Hypertension Mother     Diabetes Mother        Social History:  Social History     Tobacco Use    Smoking status: Current Every Day Smoker     Packs/day: 1.00     Types: Cigarettes    Smokeless tobacco: Never Used    Tobacco comment: no smoking after m.n prior to sx   Substance and Sexual Activity    Alcohol use: No    Drug use: No    Sexual activity: Yes     Partners: Male        Review of Systems     Review of Systems   Constitutional: Negative for activity change,  appetite change, chills, diaphoresis and fever.   HENT: Positive for ear pain (bilat) and sore throat. Negative for congestion, drooling, ear discharge, mouth sores, rhinorrhea, sinus pain and trouble swallowing.    Eyes: Negative for pain and discharge.   Respiratory: Positive for cough. Negative for chest tightness, shortness of breath, wheezing and stridor.    Cardiovascular: Negative for chest pain, palpitations and leg swelling.   Gastrointestinal: Negative for abdominal distention, abdominal pain, blood in stool, constipation, diarrhea, nausea and vomiting.   Genitourinary: Negative for difficulty urinating, dysuria, flank pain, frequency, hematuria and urgency.   Musculoskeletal: Positive for neck pain (sore). Negative for arthralgias, back pain and myalgias.   Skin: Negative for pallor, rash and wound.   Neurological: Positive for headaches (posterior). Negative for dizziness, seizures, syncope, weakness, light-headedness and numbness.   All other systems reviewed and are negative.       Physical Exam     Initial Vitals   BP Pulse Resp Temp SpO2   12/23/19 1545 12/23/19 1545 12/23/19 1545 12/23/19 1549 12/23/19 1545   131/66 91 18 98.6 °F (37 °C) 99 %      MAP       --                 Physical Exam  Nursing Notes and Vital Signs Reviewed.  Constitutional: Patient is in no acute distress. Well-developed and well-nourished.  Head: Atraumatic. Normocephalic.  Eyes: PERRL. EOM intact. Conjunctivae are not pale. No scleral icterus.  ENT: Mild pharyngeal erythema. Mucous membranes are moist. Oropharynx is clear and symmetric.    Neck: Supple. Full ROM. No lymphadenopathy.  Cardiovascular: Regular rate. Regular rhythm. No murmurs, rubs, or gallops. Distal pulses are 2+ and symmetric.  Pulmonary/Chest: No respiratory distress. Clear to auscultation bilaterally. No wheezing or rales.  Abdominal: Soft and non-distended.  There is no tenderness.  No rebound, guarding, or rigidity.  Genitourinary: No CVA  "tenderness  Musculoskeletal: Moves all extremities. No obvious deformities. No edema. No calf tenderness.  Skin: Warm and dry.  Neurological:  Alert, awake, and appropriate.  Normal speech.  No acute focal neurological deficits are appreciated.  Psychiatric: Normal affect. Good eye contact. Appropriate in content.     ED Course   Procedures  ED Vital Signs:  Vitals:    12/23/19 1545 12/23/19 1549   BP: 131/66    Pulse: 91    Resp: 18    Temp:  98.6 °F (37 °C)   TempSrc:  Oral   SpO2: 99%    Weight: 94 kg (207 lb 3.7 oz)    Height: 5' 7" (1.702 m)        Abnormal Lab Results:  Labs Reviewed   URINALYSIS, REFLEX TO URINE CULTURE - Abnormal; Notable for the following components:       Result Value    Occult Blood UA 1+ (*)     All other components within normal limits    Narrative:     Preferred Collection Type->Urine, Clean Catch   THROAT SCREEN, RAPID   INFLUENZA A & B BY MOLECULAR   CULTURE, STREP A,  THROAT   HIV 1 / 2 ANTIBODY   PREGNANCY TEST, URINE RAPID   URINALYSIS MICROSCOPIC    Narrative:     Preferred Collection Type->Urine, Clean Catch        All Lab Results:  Results for orders placed or performed during the hospital encounter of 12/23/19   Rapid strep screen   Result Value Ref Range    Rapid Strep A Screen Negative Negative   Influenza A & B by Molecular   Result Value Ref Range    Influenza A, Molecular Negative Negative    Influenza B, Molecular Negative Negative    Flu A & B Source Nasal swab    HIV 1/2 Ag/Ab (4th Gen)   Result Value Ref Range    HIV 1/2 Ag/Ab Negative Negative   Urinalysis, Reflex to Urine Culture Urine, Clean Catch   Result Value Ref Range    Specimen UA Urine, Clean Catch     Color, UA Yellow Yellow, Straw, Hafsa    Appearance, UA Clear Clear    pH, UA 7.0 5.0 - 8.0    Specific Gravity, UA 1.010 1.005 - 1.030    Protein, UA Negative Negative    Glucose, UA Negative Negative    Ketones, UA Negative Negative    Bilirubin (UA) Negative Negative    Occult Blood UA 1+ (A) Negative    " Nitrite, UA Negative Negative    Urobilinogen, UA Negative <2.0 EU/dL    Leukocytes, UA Negative Negative   Pregnancy, urine rapid (UPT)   Result Value Ref Range    Preg Test, Ur Negative    Urinalysis Microscopic   Result Value Ref Range    RBC, UA 1 0 - 4 /hpf    Bacteria Rare None-Occ /hpf    Microscopic Comment SEE COMMENT           The Emergency Provider reviewed the vital signs and test results, which are outlined above.     ED Discussion     5:54 PM:  Discussed with pt all pertinent ED information and results. Discussed pt dx and plan of tx. Gave pt all f/u and return to the ED instructions. All questions and concerns were addressed at this time. Pt expresses understanding of information and instructions, and is comfortable with plan to discharge. Pt is stable for discharge.    I discussed with patient and/or family/caretaker that evaluation in the ED does not suggest any emergent or life threatening medical conditions requiring immediate intervention beyond what was provided in the ED, and I believe patient is safe for discharge.  Regardless, an unremarkable evaluation in the ED does not preclude the development or presence of a serious of life threatening condition. As such, patient was instructed to return immediately for any worsening or change in current symptoms.         Medical Decision Making:   Clinical Tests:   Lab Tests: Ordered and Reviewed           ED Medication(s):  Medications   dexamethasone injection 4 mg (has no administration in time range)       New Prescriptions    DEXTROMETHORPHAN-GUAIFENESIN  MG/5 ML (ROBITUSSIN-DM)  MG/5 ML LIQUID    Take 5 mLs by mouth every 6 (six) hours as needed (cough).       Follow-up Information     Camila Ruby MD.    Specialty:  Internal Medicine  Why:  As needed  Contact information:  [address] [place] [address] [address] [place] [address] [phone]                       Scribe Attestation:   Scribe #1: I performed the above scribed service  and the documentation accurately describes the services I performed. I attest to the accuracy of the note.     Attending:   Physician Attestation Statement for Scribe #1: I, Vazquez Constantino NP, personally performed the services described in this documentation, as scribed by Hood Riggs, in my presence, and it is both accurate and complete.           Clinical Impression       ICD-10-CM ICD-9-CM   1. Viral URI with cough J06.9 465.9    B97.89    2. Sore throat J02.9 462       Disposition:   Disposition: Discharged  Condition: Stable         Vazquez Constantino NP  12/23/19 9710

## 2019-12-24 NOTE — ED NOTES
Pt presents to ED with flu-like symptoms of body aches, headache, chills, cough, and generalized weakness x 3 days

## 2019-12-26 LAB — BACTERIA THROAT CULT: NORMAL

## 2021-07-16 ENCOUNTER — HOSPITAL ENCOUNTER (EMERGENCY)
Facility: HOSPITAL | Age: 41
Discharge: HOME | End: 2021-07-16
Attending: EMERGENCY MEDICINE
Payer: MEDICAID

## 2021-07-16 VITALS
TEMPERATURE: 100 F | OXYGEN SATURATION: 100 % | HEART RATE: 98 BPM | WEIGHT: 199.63 LBS | RESPIRATION RATE: 18 BRPM | DIASTOLIC BLOOD PRESSURE: 66 MMHG | HEIGHT: 67 IN | BODY MASS INDEX: 31.33 KG/M2 | SYSTOLIC BLOOD PRESSURE: 139 MMHG

## 2021-07-16 DIAGNOSIS — L02.31 ABSCESS OF BUTTOCK: Primary | ICD-10-CM

## 2021-07-16 PROCEDURE — 10060 I&D ABSCESS SIMPLE/SINGLE: CPT

## 2021-07-16 PROCEDURE — 99282 EMERGENCY DEPT VISIT SF MDM: CPT | Mod: 25

## 2021-07-16 PROCEDURE — 25000003 PHARM REV CODE 250: Performed by: NURSE PRACTITIONER

## 2021-07-16 RX ORDER — LIDOCAINE HYDROCHLORIDE 10 MG/ML
10 INJECTION, SOLUTION EPIDURAL; INFILTRATION; INTRACAUDAL; PERINEURAL
Status: DISCONTINUED | OUTPATIENT
Start: 2021-07-16 | End: 2021-07-16 | Stop reason: HOSPADM

## 2021-07-16 RX ORDER — HYDROCODONE BITARTRATE AND ACETAMINOPHEN 10; 325 MG/1; MG/1
1 TABLET ORAL
Status: COMPLETED | OUTPATIENT
Start: 2021-07-16 | End: 2021-07-16

## 2021-07-16 RX ADMIN — HYDROCODONE BITARTRATE AND ACETAMINOPHEN 1 TABLET: 10; 325 TABLET ORAL at 04:07

## 2022-01-08 ENCOUNTER — HOSPITAL ENCOUNTER (EMERGENCY)
Facility: HOSPITAL | Age: 42
Discharge: HOME | End: 2022-01-08
Attending: EMERGENCY MEDICINE
Payer: MEDICAID

## 2022-01-08 VITALS
DIASTOLIC BLOOD PRESSURE: 64 MMHG | OXYGEN SATURATION: 100 % | BODY MASS INDEX: 32.42 KG/M2 | TEMPERATURE: 98 F | HEART RATE: 86 BPM | RESPIRATION RATE: 18 BRPM | WEIGHT: 207 LBS | SYSTOLIC BLOOD PRESSURE: 132 MMHG

## 2022-01-08 DIAGNOSIS — M54.31 SCIATICA OF RIGHT SIDE: Primary | ICD-10-CM

## 2022-01-08 PROCEDURE — 99284 EMERGENCY DEPT VISIT MOD MDM: CPT

## 2022-01-08 RX ORDER — CYCLOBENZAPRINE HCL 10 MG
10 TABLET ORAL 3 TIMES DAILY PRN
Qty: 15 TABLET | Refills: 0 | Status: SHIPPED | OUTPATIENT
Start: 2022-01-08 | End: 2022-01-13

## 2022-01-08 RX ORDER — TRAMADOL HYDROCHLORIDE 50 MG/1
50 TABLET ORAL EVERY 6 HOURS PRN
Qty: 12 TABLET | Refills: 0 | Status: SHIPPED | OUTPATIENT
Start: 2022-01-08

## 2022-01-08 RX ORDER — METHYLPREDNISOLONE 4 MG/1
TABLET ORAL
Qty: 1 EACH | Refills: 0 | Status: SHIPPED | OUTPATIENT
Start: 2022-01-08

## 2022-01-08 NOTE — ED PROVIDER NOTES
Encounter Date: 1/8/2022       History     Chief Complaint   Patient presents with    Back Pain     Lower back pain x 2 days      The history is provided by the patient.   Back Pain   This is a new problem. The current episode started two days ago. The problem occurs constantly. The problem has been unchanged. The pain is associated with no known injury. The pain is present in the lumbar spine. The quality of the pain is described as shooting and aching. The pain does not radiate. The symptoms are aggravated by bending, twisting and certain positions. Associated symptoms include leg pain. Pertinent negatives include no chest pain, no fever, no numbness, no bowel incontinence, no perianal numbness, no bladder incontinence, no dysuria and no weakness. She has tried nothing for the symptoms.     Review of patient's allergies indicates:  No Known Allergies  Past Medical History:   Diagnosis Date    Asthma      Past Surgical History:   Procedure Laterality Date    BREAST LUMPECTOMY Right     HYSTERECTOMY      LEG SURGERY Left 2018    fracture repair    TUBAL LIGATION       Family History   Problem Relation Age of Onset    Heart disease Mother     Hypertension Mother     Diabetes Mother      Social History     Tobacco Use    Smoking status: Current Every Day Smoker     Packs/day: 1.00     Types: Cigarettes    Smokeless tobacco: Never Used    Tobacco comment: no smoking after m.n prior to sx   Substance Use Topics    Alcohol use: No    Drug use: No     Review of Systems   Constitutional: Negative for fever.   HENT: Negative for sore throat.    Respiratory: Negative for shortness of breath.    Cardiovascular: Negative for chest pain.   Gastrointestinal: Negative for bowel incontinence and nausea.   Genitourinary: Negative for bladder incontinence and dysuria.   Musculoskeletal: Positive for back pain.   Skin: Negative for rash.   Neurological: Negative for weakness and numbness.   Hematological: Does not  bruise/bleed easily.   All other systems reviewed and are negative.      Physical Exam     Initial Vitals [01/08/22 1100]   BP Pulse Resp Temp SpO2   132/64 86 18 98.2 °F (36.8 °C) 100 %      MAP       --         Physical Exam    Constitutional: She appears well-developed and well-nourished. She is not diaphoretic. No distress.   HENT:   Head: Normocephalic and atraumatic.   Eyes: Conjunctivae and EOM are normal. Pupils are equal, round, and reactive to light.   Neck: Neck supple.   Normal range of motion.  Cardiovascular: Normal rate, regular rhythm and normal heart sounds.   No murmur heard.  Pulmonary/Chest: Breath sounds normal. No respiratory distress. She has no wheezes. She has no rales.   Abdominal: Abdomen is soft. Bowel sounds are normal. There is no abdominal tenderness. There is no rebound, no guarding and no CVA tenderness.   Musculoskeletal:         General: No edema. Normal range of motion.      Cervical back: Normal range of motion and neck supple.      Lumbar back: Tenderness present.      Comments: R PS tenderness. No midline tenderness, step offs or deformities, Pt able to stand on toes and heels, strength 5/5 BL, light sensation intact.          Neurological: She is alert and oriented to person, place, and time. No cranial nerve deficit. GCS score is 15. GCS eye subscore is 4. GCS verbal subscore is 5. GCS motor subscore is 6.   Skin: Skin is warm and dry. Capillary refill takes less than 2 seconds.   Psychiatric: She has a normal mood and affect. Thought content normal.         ED Course   Procedures  Labs Reviewed - No data to display       Imaging Results    None          Medications - No data to display       I discussed with patient and/or family/caretaker that evaluation in the ED does not suggest any emergent or life threatening medical conditions requiring immediate intervention beyond what was provided in the ED, and I believe patient is safe for discharge.  Regardless, an unremarkable  evaluation in the ED does not preclude the development or presence of a serious of life threatening condition. As such, patient was instructed to return immediately for any worsening or change in current symptoms.                   Clinical Impression:   Final diagnoses:  [M54.31] Sciatica of right side (Primary)          ED Disposition Condition    Discharge Stable        ED Prescriptions     Medication Sig Dispense Start Date End Date Auth. Provider    methylPREDNISolone (MEDROL DOSEPACK) 4 mg tablet Take as directed 1 each 1/8/2022  ANGÉLICA Craft Jr.    cyclobenzaprine (FLEXERIL) 10 MG tablet Take 1 tablet (10 mg total) by mouth 3 (three) times daily as needed for Muscle spasms. 15 tablet 1/8/2022 1/13/2022 ANGÉLICA Craft Jr.    traMADoL (ULTRAM) 50 mg tablet Take 1 tablet (50 mg total) by mouth every 6 (six) hours as needed for Pain. 12 tablet 1/8/2022  ANGÉLICA Craft Jr.        Follow-up Information    None          ANGÉLICA Craft Jr.  01/08/22 1121

## 2022-04-10 ENCOUNTER — HISTORICAL (OUTPATIENT)
Dept: ADMINISTRATIVE | Facility: HOSPITAL | Age: 42
End: 2022-04-10
Payer: MEDICAID

## 2022-04-11 ENCOUNTER — HISTORICAL (OUTPATIENT)
Dept: ADMINISTRATIVE | Facility: HOSPITAL | Age: 42
End: 2022-04-11
Payer: MEDICAID

## 2022-04-28 VITALS
SYSTOLIC BLOOD PRESSURE: 107 MMHG | HEIGHT: 67 IN | DIASTOLIC BLOOD PRESSURE: 66 MMHG | WEIGHT: 210.56 LBS | BODY MASS INDEX: 33.05 KG/M2

## 2022-04-28 VITALS
HEIGHT: 67 IN | BODY MASS INDEX: 33.05 KG/M2 | DIASTOLIC BLOOD PRESSURE: 66 MMHG | SYSTOLIC BLOOD PRESSURE: 107 MMHG | WEIGHT: 210.56 LBS

## 2023-02-08 ENCOUNTER — HOSPITAL ENCOUNTER (EMERGENCY)
Facility: HOSPITAL | Age: 43
Discharge: HOME | End: 2023-02-08
Payer: MEDICAID

## 2023-02-08 VITALS
TEMPERATURE: 98 F | HEART RATE: 62 BPM | OXYGEN SATURATION: 100 % | RESPIRATION RATE: 18 BRPM | DIASTOLIC BLOOD PRESSURE: 71 MMHG | BODY MASS INDEX: 34.05 KG/M2 | WEIGHT: 217.38 LBS | SYSTOLIC BLOOD PRESSURE: 130 MMHG

## 2023-02-08 DIAGNOSIS — R07.9 CHEST PAIN: ICD-10-CM

## 2023-02-08 LAB
ALBUMIN SERPL BCP-MCNC: 4 G/DL (ref 3.5–5.2)
ALP SERPL-CCNC: 66 U/L (ref 55–135)
ALT SERPL W/O P-5'-P-CCNC: 15 U/L (ref 10–44)
ANION GAP SERPL CALC-SCNC: 12 MMOL/L (ref 8–16)
AST SERPL-CCNC: 18 U/L (ref 10–40)
BASOPHILS # BLD AUTO: 0.03 K/UL (ref 0–0.2)
BASOPHILS NFR BLD: 0.5 % (ref 0–1.9)
BILIRUB SERPL-MCNC: 0.3 MG/DL (ref 0.1–1)
BNP SERPL-MCNC: 45 PG/ML (ref 0–99)
BUN SERPL-MCNC: 7 MG/DL (ref 6–20)
CALCIUM SERPL-MCNC: 9.3 MG/DL (ref 8.7–10.5)
CHLORIDE SERPL-SCNC: 108 MMOL/L (ref 95–110)
CO2 SERPL-SCNC: 18 MMOL/L (ref 23–29)
CREAT SERPL-MCNC: 0.7 MG/DL (ref 0.5–1.4)
DIFFERENTIAL METHOD: ABNORMAL
EOSINOPHIL # BLD AUTO: 0.1 K/UL (ref 0–0.5)
EOSINOPHIL NFR BLD: 1.7 % (ref 0–8)
ERYTHROCYTE [DISTWIDTH] IN BLOOD BY AUTOMATED COUNT: 13.8 % (ref 11.5–14.5)
EST. GFR  (NO RACE VARIABLE): >60 ML/MIN/1.73 M^2
GLUCOSE SERPL-MCNC: 78 MG/DL (ref 70–110)
HCT VFR BLD AUTO: 39 % (ref 37–48.5)
HCV AB SERPL QL IA: NEGATIVE
HEP C VIRUS HOLD SPECIMEN: NORMAL
HGB BLD-MCNC: 12.3 G/DL (ref 12–16)
HIV 1+2 AB+HIV1 P24 AG SERPL QL IA: NEGATIVE
IMM GRANULOCYTES # BLD AUTO: 0.01 K/UL (ref 0–0.04)
IMM GRANULOCYTES NFR BLD AUTO: 0.2 % (ref 0–0.5)
LYMPHOCYTES # BLD AUTO: 2.4 K/UL (ref 1–4.8)
LYMPHOCYTES NFR BLD: 37.8 % (ref 18–48)
MCH RBC QN AUTO: 26.9 PG (ref 27–31)
MCHC RBC AUTO-ENTMCNC: 31.5 G/DL (ref 32–36)
MCV RBC AUTO: 85 FL (ref 82–98)
MONOCYTES # BLD AUTO: 0.5 K/UL (ref 0.3–1)
MONOCYTES NFR BLD: 8 % (ref 4–15)
NEUTROPHILS # BLD AUTO: 3.3 K/UL (ref 1.8–7.7)
NEUTROPHILS NFR BLD: 51.8 % (ref 38–73)
NRBC BLD-RTO: 0 /100 WBC
PLATELET # BLD AUTO: 305 K/UL (ref 150–450)
PMV BLD AUTO: 8.9 FL (ref 9.2–12.9)
POTASSIUM SERPL-SCNC: 3.7 MMOL/L (ref 3.5–5.1)
PROT SERPL-MCNC: 7.7 G/DL (ref 6–8.4)
RBC # BLD AUTO: 4.58 M/UL (ref 4–5.4)
SODIUM SERPL-SCNC: 138 MMOL/L (ref 136–145)
TROPONIN I SERPL DL<=0.01 NG/ML-MCNC: <0.006 NG/ML (ref 0–0.03)
TROPONIN I SERPL DL<=0.01 NG/ML-MCNC: <0.006 NG/ML (ref 0–0.03)
WBC # BLD AUTO: 6.4 K/UL (ref 3.9–12.7)

## 2023-02-08 PROCEDURE — 99285 EMERGENCY DEPT VISIT HI MDM: CPT | Mod: 25

## 2023-02-08 PROCEDURE — 87389 HIV-1 AG W/HIV-1&-2 AB AG IA: CPT | Performed by: EMERGENCY MEDICINE

## 2023-02-08 PROCEDURE — 86803 HEPATITIS C AB TEST: CPT | Performed by: EMERGENCY MEDICINE

## 2023-02-08 PROCEDURE — 93005 ELECTROCARDIOGRAM TRACING: CPT

## 2023-02-08 PROCEDURE — 93010 EKG 12-LEAD: ICD-10-PCS | Mod: ,,, | Performed by: INTERNAL MEDICINE

## 2023-02-08 PROCEDURE — 80053 COMPREHEN METABOLIC PANEL: CPT | Performed by: REGISTERED NURSE

## 2023-02-08 PROCEDURE — 93010 ELECTROCARDIOGRAM REPORT: CPT | Mod: ,,, | Performed by: INTERNAL MEDICINE

## 2023-02-08 PROCEDURE — 83880 ASSAY OF NATRIURETIC PEPTIDE: CPT | Performed by: REGISTERED NURSE

## 2023-02-08 PROCEDURE — 25000003 PHARM REV CODE 250: Performed by: REGISTERED NURSE

## 2023-02-08 PROCEDURE — 84484 ASSAY OF TROPONIN QUANT: CPT | Performed by: REGISTERED NURSE

## 2023-02-08 PROCEDURE — 85025 COMPLETE CBC W/AUTO DIFF WBC: CPT | Performed by: REGISTERED NURSE

## 2023-02-08 RX ORDER — ASPIRIN 325 MG
325 TABLET ORAL
Status: COMPLETED | OUTPATIENT
Start: 2023-02-08 | End: 2023-02-08

## 2023-02-08 RX ADMIN — ASPIRIN 325 MG ORAL TABLET 325 MG: 325 PILL ORAL at 12:02

## 2023-02-08 NOTE — FIRST PROVIDER EVALUATION
Medical screening examination initiated.  I have conducted a focused provider triage encounter, findings are as follows:    Brief history of present illness:  Chest pain that began this morning    Vitals:    02/08/23 1210   BP: 130/71   BP Location: Right arm   Patient Position: Sitting   Pulse: 62   Resp: 18   Temp: 98 °F (36.7 °C)   TempSrc: Oral   SpO2: 100%   Weight: 98.6 kg (217 lb 6 oz)       Pertinent physical exam:  No acute distress, patient alert and oriented,     Brief workup plan:  Cardiac workup    Preliminary workup initiated; this workup will be continued and followed by the physician or advanced practice provider that is assigned to the patient when roomed.

## 2024-02-23 ENCOUNTER — HOSPITAL ENCOUNTER (EMERGENCY)
Facility: HOSPITAL | Age: 44
Discharge: HOME | End: 2024-02-23
Attending: EMERGENCY MEDICINE
Payer: MEDICAID

## 2024-02-23 VITALS
RESPIRATION RATE: 18 BRPM | OXYGEN SATURATION: 98 % | HEART RATE: 64 BPM | TEMPERATURE: 99 F | DIASTOLIC BLOOD PRESSURE: 62 MMHG | WEIGHT: 197 LBS | BODY MASS INDEX: 30.92 KG/M2 | SYSTOLIC BLOOD PRESSURE: 106 MMHG | HEIGHT: 67 IN

## 2024-02-23 DIAGNOSIS — R20.2 PARESTHESIA: ICD-10-CM

## 2024-02-23 DIAGNOSIS — R07.9 CHEST PAIN: ICD-10-CM

## 2024-02-23 DIAGNOSIS — M79.606 LEG PAIN: ICD-10-CM

## 2024-02-23 LAB
ALBUMIN SERPL BCP-MCNC: 4.1 G/DL (ref 3.5–5.2)
ALP SERPL-CCNC: 56 U/L (ref 55–135)
ALT SERPL W/O P-5'-P-CCNC: 9 U/L (ref 10–44)
ANION GAP SERPL CALC-SCNC: 9 MMOL/L (ref 8–16)
AST SERPL-CCNC: 12 U/L (ref 10–40)
BACTERIA #/AREA URNS HPF: ABNORMAL /HPF
BASOPHILS # BLD AUTO: 0.02 K/UL (ref 0–0.2)
BASOPHILS NFR BLD: 0.3 % (ref 0–1.9)
BILIRUB SERPL-MCNC: 0.4 MG/DL (ref 0.1–1)
BILIRUB UR QL STRIP: NEGATIVE
BNP SERPL-MCNC: <10 PG/ML (ref 0–99)
BUN SERPL-MCNC: 6 MG/DL (ref 6–20)
CALCIUM SERPL-MCNC: 9.5 MG/DL (ref 8.7–10.5)
CHLORIDE SERPL-SCNC: 106 MMOL/L (ref 95–110)
CLARITY UR: CLEAR
CO2 SERPL-SCNC: 24 MMOL/L (ref 23–29)
COLOR UR: YELLOW
CREAT SERPL-MCNC: 0.8 MG/DL (ref 0.5–1.4)
D DIMER PPP IA.FEU-MCNC: <0.19 MG/L FEU
DIFFERENTIAL METHOD BLD: ABNORMAL
EOSINOPHIL # BLD AUTO: 0.1 K/UL (ref 0–0.5)
EOSINOPHIL NFR BLD: 0.9 % (ref 0–8)
ERYTHROCYTE [DISTWIDTH] IN BLOOD BY AUTOMATED COUNT: 14.4 % (ref 11.5–14.5)
EST. GFR  (NO RACE VARIABLE): >60 ML/MIN/1.73 M^2
GLUCOSE SERPL-MCNC: 126 MG/DL (ref 70–110)
GLUCOSE UR QL STRIP: NEGATIVE
HCT VFR BLD AUTO: 40.5 % (ref 37–48.5)
HGB BLD-MCNC: 12.9 G/DL (ref 12–16)
HGB UR QL STRIP: ABNORMAL
HYALINE CASTS #/AREA URNS LPF: 0 /LPF
IMM GRANULOCYTES # BLD AUTO: 0.01 K/UL (ref 0–0.04)
IMM GRANULOCYTES NFR BLD AUTO: 0.1 % (ref 0–0.5)
KETONES UR QL STRIP: ABNORMAL
LEUKOCYTE ESTERASE UR QL STRIP: NEGATIVE
LYMPHOCYTES # BLD AUTO: 2.5 K/UL (ref 1–4.8)
LYMPHOCYTES NFR BLD: 33.3 % (ref 18–48)
MCH RBC QN AUTO: 26.9 PG (ref 27–31)
MCHC RBC AUTO-ENTMCNC: 31.9 G/DL (ref 32–36)
MCV RBC AUTO: 85 FL (ref 82–98)
MICROSCOPIC COMMENT: ABNORMAL
MONOCYTES # BLD AUTO: 0.5 K/UL (ref 0.3–1)
MONOCYTES NFR BLD: 7.2 % (ref 4–15)
NEUTROPHILS # BLD AUTO: 4.3 K/UL (ref 1.8–7.7)
NEUTROPHILS NFR BLD: 58.2 % (ref 38–73)
NITRITE UR QL STRIP: NEGATIVE
NRBC BLD-RTO: 0 /100 WBC
PH UR STRIP: 7 [PH] (ref 5–8)
PLATELET # BLD AUTO: 325 K/UL (ref 150–450)
PMV BLD AUTO: 8.9 FL (ref 9.2–12.9)
POTASSIUM SERPL-SCNC: 3.7 MMOL/L (ref 3.5–5.1)
PROT SERPL-MCNC: 7.5 G/DL (ref 6–8.4)
PROT UR QL STRIP: ABNORMAL
RBC # BLD AUTO: 4.79 M/UL (ref 4–5.4)
RBC #/AREA URNS HPF: 20 /HPF (ref 0–4)
SODIUM SERPL-SCNC: 139 MMOL/L (ref 136–145)
SP GR UR STRIP: 1.03 (ref 1–1.03)
SQUAMOUS #/AREA URNS HPF: 1 /HPF
TROPONIN I SERPL DL<=0.01 NG/ML-MCNC: <0.006 NG/ML (ref 0–0.03)
URN SPEC COLLECT METH UR: ABNORMAL
UROBILINOGEN UR STRIP-ACNC: ABNORMAL EU/DL
WBC # BLD AUTO: 7.45 K/UL (ref 3.9–12.7)
WBC #/AREA URNS HPF: 2 /HPF (ref 0–5)

## 2024-02-23 PROCEDURE — 83880 ASSAY OF NATRIURETIC PEPTIDE: CPT | Performed by: NURSE PRACTITIONER

## 2024-02-23 PROCEDURE — 63600175 PHARM REV CODE 636 W HCPCS: Performed by: EMERGENCY MEDICINE

## 2024-02-23 PROCEDURE — 80053 COMPREHEN METABOLIC PANEL: CPT | Performed by: NURSE PRACTITIONER

## 2024-02-23 PROCEDURE — 99285 EMERGENCY DEPT VISIT HI MDM: CPT | Mod: 25

## 2024-02-23 PROCEDURE — 93010 ELECTROCARDIOGRAM REPORT: CPT | Mod: ,,, | Performed by: INTERNAL MEDICINE

## 2024-02-23 PROCEDURE — 81000 URINALYSIS NONAUTO W/SCOPE: CPT | Performed by: NURSE PRACTITIONER

## 2024-02-23 PROCEDURE — 85025 COMPLETE CBC W/AUTO DIFF WBC: CPT | Performed by: NURSE PRACTITIONER

## 2024-02-23 PROCEDURE — 84484 ASSAY OF TROPONIN QUANT: CPT | Performed by: NURSE PRACTITIONER

## 2024-02-23 PROCEDURE — 93005 ELECTROCARDIOGRAM TRACING: CPT

## 2024-02-23 PROCEDURE — 85379 FIBRIN DEGRADATION QUANT: CPT | Performed by: EMERGENCY MEDICINE

## 2024-02-23 RX ORDER — KETOROLAC TROMETHAMINE 30 MG/ML
30 INJECTION, SOLUTION INTRAMUSCULAR; INTRAVENOUS
Status: COMPLETED | OUTPATIENT
Start: 2024-02-23 | End: 2024-02-23

## 2024-02-23 RX ADMIN — KETOROLAC TROMETHAMINE 30 MG: 30 INJECTION, SOLUTION INTRAMUSCULAR; INTRAVENOUS at 08:02

## 2024-02-23 NOTE — FIRST PROVIDER EVALUATION
"Medical screening examination initiated.  I have conducted a focused provider triage encounter, findings are as follows:    Brief history of present illness:  Pt. C/o left sided chest pain     Vitals:    02/23/24 1713   BP: 125/74   BP Location: Right arm   Patient Position: Sitting   Pulse: 90   Resp: 18   Temp: 99.4 °F (37.4 °C)   TempSrc: Oral   SpO2: 99%   Weight: 89.4 kg (196 lb 15.7 oz)   Height: 5' 7" (1.702 m)       Pertinent physical exam:  nad    Brief workup plan:  labs     Preliminary workup initiated; this workup will be continued and followed by the physician or advanced practice provider that is assigned to the patient when roomed.  "

## 2024-02-24 LAB
OHS QRS DURATION: 102 MS
OHS QRS DURATION: 102 MS
OHS QTC CALCULATION: 424 MS
OHS QTC CALCULATION: 457 MS

## 2024-02-24 NOTE — ED PROVIDER NOTES
Emergency Medicine Provider Note - 2/23/2024      SCRIBE #2 NOTE: I, Benignoami Esquivel, am scribing for, and in the presence of,  Jarek Conde MD. I have scribed the remaining portions of the note not scribed by Scribe #1.     SCRIBE NOTE: I, Josue Peewee, am scribing for, and in the presence of, Angelica Leonard DO. I scribed the HPI, ROS, and PE.     History     Chief Complaint   Patient presents with    Chest Pain     Pt c/o chest pain x 2 weeks with pain to her left shoulder and left side of her neck.  Pt also c/o SOB and bilateral foot swelling.       Allergies:  Review of patient's allergies indicates:  No Known Allergies     History of Present Illness   HPI    2/23/2024, 7:52 PM  The history is provided by the patient    Janice Kerr is a 43 y.o. female presenting to the ED for left-sided upper chest pain that onset at 5 AM. The pain radiates from her L neck and to her L shoulder. She states that sxs gradually onset 2 weeks ago before worsening this AM. Associated sxs include BLE swelling, L great toe pain, R calf pain, L hand numbness, and L hand tingling. Pt denies any diaphoresis, N/V, or fever. Prior Tx includes tylenol, ibuprofen, and ASA.      Arrival mode: Private Vehicle     PCP: Camila Ruby MD     Past Medical History:  Past Medical History:   Diagnosis Date    Asthma        Past Surgical History:  Past Surgical History:   Procedure Laterality Date    BREAST LUMPECTOMY Right     HYSTERECTOMY      LEG SURGERY Left 2018    fracture repair    TUBAL LIGATION           Family History:  Family History   Problem Relation Age of Onset    Heart disease Mother     Hypertension Mother     Diabetes Mother        Social History:  Social History     Tobacco Use    Smoking status: Every Day     Current packs/day: 1.00     Types: Cigarettes    Smokeless tobacco: Never    Tobacco comments:     no smoking after m.n prior to sx   Substance and Sexual Activity    Alcohol use: No    Drug use: No     Sexual activity: Yes     Partners: Male        Review of Systems   Review of Systems   Constitutional:  Negative for diaphoresis and fever.   HENT:  Negative for sore throat.    Respiratory:  Negative for shortness of breath.    Cardiovascular:  Positive for chest pain (L upper) and leg swelling (BLE).   Gastrointestinal:  Negative for nausea and vomiting.   Genitourinary:  Negative for dysuria.   Musculoskeletal:  Positive for arthralgias (L shoulder), myalgias (L great toe, R calf) and neck pain (L). Negative for back pain.   Skin:  Negative for rash.   Neurological:  Positive for numbness (L hand). Negative for weakness.        (+) L hand tingling   Hematological:  Does not bruise/bleed easily.   All other systems reviewed and are negative.         Physical Exam     Initial Vitals [02/23/24 1713]   BP Pulse Resp Temp SpO2   125/74 90 18 99.4 °F (37.4 °C) 99 %      MAP       --          Physical Exam    Nursing Notes and Vital Signs Reviewed.  Constitutional: Patient is in no acute distress. Well-developed and well-nourished.  Head: Atraumatic. Normocephalic.  Eyes: PERRL. EOM intact. Conjunctivae are not pale. No scleral icterus.  ENT: Mucous membranes are moist. Oropharynx is clear and symmetric.    Neck: Supple. Full ROM. No lymphadenopathy.  Cardiovascular: Regular rate. Regular rhythm. No murmurs, rubs, or gallops. Distal pulses are 2+ and symmetric.  Pulmonary/Chest: No respiratory distress. Clear to auscultation bilaterally. No wheezing or rales.  Abdominal: Soft and non-distended.  There is no tenderness.  No rebound, guarding, or rigidity. Good bowel sounds.  Genitourinary: No CVA tenderness  Musculoskeletal: Moves all extremities. Left upper chest tender to palpation. Ingrown toenail on R great toe. No R great toe redness or swelling. No obvious deformities. No edema. No calf tenderness.  Skin: Warm and dry.  Neurological:  Alert, awake, and appropriate. Cranial nerves II-XII intact. Normal speech.   "No acute focal neurological deficits are appreciated.  Psychiatric: Normal affect. Good eye contact. Appropriate in content.     ED Course   ED Procedures:  Procedures    ED Vital Signs:  Vitals:    02/23/24 1713 02/23/24 1918 02/23/24 1924 02/23/24 1929   BP: 125/74 118/63     Pulse: 90 (!) 53 65 68   Resp: 18 13 14 15   Temp: 99.4 °F (37.4 °C)      TempSrc: Oral      SpO2: 99%      Weight: 89.4 kg (196 lb 15.7 oz)      Height: 5' 7" (1.702 m)       02/23/24 1933 02/23/24 2020 02/23/24 2034   BP: 111/83 110/64 (!) 115/59   Pulse: 69 (!) 55 62   Resp:  18 16   Temp:   98.1 °F (36.7 °C)   TempSrc:   Oral   SpO2: 100% 100% 100%   Weight:      Height:          Abnormal Lab Results:  Labs Reviewed   CBC W/ AUTO DIFFERENTIAL - Abnormal; Notable for the following components:       Result Value    MCH 26.9 (*)     MCHC 31.9 (*)     MPV 8.9 (*)     All other components within normal limits   COMPREHENSIVE METABOLIC PANEL - Abnormal; Notable for the following components:    Glucose 126 (*)     ALT 9 (*)     All other components within normal limits   URINALYSIS, REFLEX TO URINE CULTURE - Abnormal; Notable for the following components:    Protein, UA 1+ (*)     Ketones, UA Trace (*)     Occult Blood UA Trace (*)     Urobilinogen, UA 2.0-3.0 (*)     All other components within normal limits    Narrative:     Specimen Source->Urine   URINALYSIS MICROSCOPIC - Abnormal; Notable for the following components:    RBC, UA 20 (*)     All other components within normal limits    Narrative:     Specimen Source->Urine   B-TYPE NATRIURETIC PEPTIDE   TROPONIN I   D DIMER, QUANTITATIVE   TROPONIN I        All Lab Results:  Results for orders placed or performed during the hospital encounter of 02/23/24   CBC auto differential   Result Value Ref Range    WBC 7.45 3.90 - 12.70 K/uL    RBC 4.79 4.00 - 5.40 M/uL    Hemoglobin 12.9 12.0 - 16.0 g/dL    Hematocrit 40.5 37.0 - 48.5 %    MCV 85 82 - 98 fL    MCH 26.9 (L) 27.0 - 31.0 pg    MCHC 31.9 " (L) 32.0 - 36.0 g/dL    RDW 14.4 11.5 - 14.5 %    Platelets 325 150 - 450 K/uL    MPV 8.9 (L) 9.2 - 12.9 fL    Immature Granulocytes 0.1 0.0 - 0.5 %    Gran # (ANC) 4.3 1.8 - 7.7 K/uL    Immature Grans (Abs) 0.01 0.00 - 0.04 K/uL    Lymph # 2.5 1.0 - 4.8 K/uL    Mono # 0.5 0.3 - 1.0 K/uL    Eos # 0.1 0.0 - 0.5 K/uL    Baso # 0.02 0.00 - 0.20 K/uL    nRBC 0 0 /100 WBC    Gran % 58.2 38.0 - 73.0 %    Lymph % 33.3 18.0 - 48.0 %    Mono % 7.2 4.0 - 15.0 %    Eosinophil % 0.9 0.0 - 8.0 %    Basophil % 0.3 0.0 - 1.9 %    Differential Method Automated    Brain natriuretic peptide   Result Value Ref Range    BNP <10 0 - 99 pg/mL   Comprehensive metabolic panel   Result Value Ref Range    Sodium 139 136 - 145 mmol/L    Potassium 3.7 3.5 - 5.1 mmol/L    Chloride 106 95 - 110 mmol/L    CO2 24 23 - 29 mmol/L    Glucose 126 (H) 70 - 110 mg/dL    BUN 6 6 - 20 mg/dL    Creatinine 0.8 0.5 - 1.4 mg/dL    Calcium 9.5 8.7 - 10.5 mg/dL    Total Protein 7.5 6.0 - 8.4 g/dL    Albumin 4.1 3.5 - 5.2 g/dL    Total Bilirubin 0.4 0.1 - 1.0 mg/dL    Alkaline Phosphatase 56 55 - 135 U/L    AST 12 10 - 40 U/L    ALT 9 (L) 10 - 44 U/L    eGFR >60 >60 mL/min/1.73 m^2    Anion Gap 9 8 - 16 mmol/L   Troponin I   Result Value Ref Range    Troponin I <0.006 0.000 - 0.026 ng/mL   Urinalysis, Reflex to Urine Culture Urine, Clean Catch    Specimen: Urine, Clean Catch   Result Value Ref Range    Specimen UA Urine, Clean Catch     Color, UA Yellow Yellow, Straw, Hafsa    Appearance, UA Clear Clear    pH, UA 7.0 5.0 - 8.0    Specific Gravity, UA 1.030 1.005 - 1.030    Protein, UA 1+ (A) Negative    Glucose, UA Negative Negative    Ketones, UA Trace (A) Negative    Bilirubin (UA) Negative Negative    Occult Blood UA Trace (A) Negative    Nitrite, UA Negative Negative    Urobilinogen, UA 2.0-3.0 (A) <2.0 EU/dL    Leukocytes, UA Negative Negative   Urinalysis Microscopic   Result Value Ref Range    RBC, UA 20 (H) 0 - 4 /hpf    WBC, UA 2 0 - 5 /hpf    Bacteria  None None-Occ /hpf    Squam Epithel, UA 1 /hpf    Hyaline Casts, UA 0 0-1/lpf /lpf    Microscopic Comment SEE COMMENT    D-Dimer, Quantitative   Result Value Ref Range    D-Dimer <0.19 <0.50 mg/L FEU         The EKG was ordered, reviewed, and independently interpreted by the ED provider:      ECG Results              EKG 12-lead (Chest Pain) Age >30 (Preliminary result)  Result time 02/23/24 19:33:25      Wet Read by Angelica Leonard DO (02/23/24 19:33:25, [facility] - Emergency Dept., Emergency Medicine)    Rate of 70 beats per minute.  Sinus rhythm with sinus arrhythmia.  Incomplete right bundle-branch block.  Nonspecific T-wave changes.  This is compared to previous EKG dated 8th 2023- no acute changes                                  Interpretation time: 19:43  Rate: 54 BPM  Rhythm: sinus bradycardia  Interpretation: Low voltage QRS. No STEMI.    Interpretation time: 05:15  Rate: 70 BPM  Rhythm:  Sinus rhythm with marked sinus arrhyhtmia.  Interpretation: Incomplete RBBB. Nonspecific T wave abnormality. No STEMI.        Imaging Results:  Imaging Results              US Lower Extremity Veins Bilateral (Final result)  Result time 02/23/24 21:13:33      Final result by Joanne Eisenberg MD (02/23/24 21:13:33)                   Impression:      No evidence of deep venous thrombosis in either lower extremity.      Electronically signed by: Joanne Eisenberg  Date:    02/23/2024  Time:    21:13               Narrative:    EXAMINATION:  US LOWER EXTREMITY VEINS BILATERAL    CLINICAL HISTORY:  Pain in leg, unspecified    TECHNIQUE:  Duplex and color flow Doppler and dynamic compression was performed of the bilateral lower extremity veins was performed.    COMPARISON:  None    FINDINGS:  Right thigh veins: The common femoral, femoral, popliteal, upper greater saphenous, and deep femoral veins are patent and free of thrombus. The veins are normally compressible and have normal phasic flow and augmentation  response.    Right calf veins: The visualized calf veins are patent.    Left thigh veins: The common femoral, femoral, popliteal, upper greater saphenous, and deep femoral veins are patent and free of thrombus. The veins are normally compressible and have normal phasic flow and augmentation response.    Left calf veins: The visualized calf veins are patent.    Miscellaneous: None                                       CT Head Without Contrast (Final result)  Result time 02/23/24 20:31:27      Final result by Victor Hugo Boggs MD (02/23/24 20:31:27)                   Impression:      No acute intracranial CT abnormality.  Correlation and further evaluation as warranted.    All CT scans at this facility are performed  using dose modulation techniques as appropriate to performed exam including the following:  automated exposure control; adjustment of mA and/or kV according to the patients size (this includes techniques or standardized protocols for targeted exams where dose is matched to indication/reason for exam: i.e. extremities or head);  iterative reconstruction technique.      Electronically signed by: Victor Hugo Boggs  Date:    02/23/2024  Time:    20:31               Narrative:    EXAMINATION:  CT HEAD WITHOUT CONTRAST    CLINICAL HISTORY:  Transient ischemic attack (TIA);tingling of the left hand; Paresthesia of skin    TECHNIQUE:  Low dose axial CT images obtained throughout the head without intravenous contrast. Sagittal and coronal reconstructions were performed.    COMPARISON:  None.    FINDINGS:  Intracranial compartment:    Ventricles and sulci are normal in size for age without evidence of hydrocephalus. No extra-axial blood or fluid collections.    The brain parenchyma appears normal. No parenchymal mass, hemorrhage, edema or major vascular distribution infarct.    Skull/extracranial contents (limited evaluation): No fracture. Mastoid air cells and paranasal sinuses are essentially clear.                                        X-Ray Chest 1 View (Final result)  Result time 02/23/24 18:31:02      Final result by Joanne Eisenberg MD (02/23/24 18:31:02)                   Impression:      No acute abnormality.      Electronically signed by: Joanne Eisenberg  Date:    02/23/2024  Time:    18:31               Narrative:    EXAMINATION:  XR CHEST 1 VIEW    CLINICAL HISTORY:  . Chest pain, unspecified    TECHNIQUE:  Single frontal portable view of the chest was performed.    COMPARISON:  02/08/2023    FINDINGS:  Support devices: None    The lungs are clear, with normal appearance of pulmonary vasculature and no pleural effusion or pneumothorax.    The cardiac silhouette is normal in size. The hilar and mediastinal contours are unremarkable.    Bones are intact.                                            The Emergency Provider reviewed the vital signs and test results, which are outlined above.     ED Discussion   ED Medication(s):  Medications   ketorolac injection 30 mg (30 mg Intravenous Given by Other 2/23/24 2021)            8:00 PM: Dr. Leonard transfers care of patient to Dr. Conde      9:26 PM: Reassessed pt at this time. Discussed with pt all pertinent ED information and results. Discussed pt dx and plan of tx. Gave pt all f/u and return to the ED instructions. All questions and concerns were addressed at this time. Pt expresses understanding of information and instructions, and is comfortable with plan to discharge. Pt is stable for discharge.    I discussed with patient and/or family/caretaker that evaluation in the ED does not suggest any emergent or life threatening medical conditions requiring immediate intervention beyond what was provided in the ED, and I believe patient is safe for discharge.  Regardless, an unremarkable evaluation in the ED does not preclude the development or presence of a serious of life threatening condition. As such, patient was instructed to return immediately for any worsening or  change in current symptoms.       MIPS Measures     Smoker? Yes     Hypertension: None         Medical Decision Making                 Medical Decision Making  43-year-old female presenting with atypical chest pain.  Starts in her neck and radiates towards her shoulder, left upper extremity, and left chest.  Onset of constant pain starting at 9:00 a.m. this morning allows 1 troponin to rule out MI. remainder of ED workup within normal limits.  Pain may be stemming from neck.  Discussed NSAIDs and outpatient follow-up with primary care physician for further management    Amount and/or Complexity of Data Reviewed  External Data Reviewed: notes.     Details: Past medical history, medications, and allergies reviewed  Labs: ordered. Decision-making details documented in ED Course.  Radiology: ordered. Decision-making details documented in ED Course.  ECG/medicine tests: ordered and independent interpretation performed. Decision-making details documented in ED Course.    Risk  OTC drugs.  Prescription drug management.        Coding    Prescription Management: I performed a review of the patient's current Rx medication list as input by nursing staff.    Patient's Medications   New Prescriptions    No medications on file   Previous Medications    ACETAMINOPHEN (TYLENOL) 500 MG TABLET    Take 500 mg by mouth every 6 (six) hours as needed for Pain.    DEXTROMETHORPHAN-GUAIFENESIN  MG/5 ML (ROBITUSSIN-DM)  MG/5 ML LIQUID    Take 5 mLs by mouth every 6 (six) hours as needed (cough).    IBUPROFEN (ADVIL,MOTRIN) 800 MG TABLET    Take 1 tablet (800 mg total) by mouth every 6 (six) hours as needed for Pain.    METHYLPREDNISOLONE (MEDROL DOSEPACK) 4 MG TABLET    Take as directed    TRAMADOL (ULTRAM) 50 MG TABLET    Take 1 tablet (50 mg total) by mouth every 6 (six) hours as needed for Pain.    TRAMADOL (ULTRAM) 50 MG TABLET    Take 1 tablet (50 mg total) by mouth every 6 (six) hours as needed for Pain.   Modified  "Medications    No medications on file   Discontinued Medications    No medications on file          Portions of this note may have been created with voice recognition software. Occasional "wrong-word" or "sound-a-like" substitutions may have occurred due to the inherent limitations of voice recognition software. Please, read the note carefully and recognize, using context, where substitutions have occurred.          Clinical Impression       ICD-10-CM ICD-9-CM   1. Chest pain  R07.9 786.50   2. Leg pain  M79.606 729.5   3. Paresthesia  R20.2 782.0         ED Disposition  Disposition:   Disposition: Discharged  Condition: Stable        ED Follow-up   Follow-up Information       Call  Camila Ruby MD.    Specialty: Internal Medicine  Contact information:  [facility] [address]  [address]  [facility] [address] [phone]               [facility]    Specialty: Emergency Medicine  Why: As needed, If symptoms worsen  Contact information:  [address]  [facility] [address] [phone]                             Scribe Attestation:   Scribe #1: I performed the above scribed service and the documentation accurately describes the services I performed. I attest to the accuracy of the note.     Attending:   Physician Attestation Statement for Scribe #1: I, Angelica Leonard DO, personally performed the services described in this documentation, as scribed by Josue Vides, in my presence, and it is both accurate and complete.       Scribe Attestation:   Scribe #2: I performed the above scribed service and the documentation accurately describes the services I performed. I attest to the accuracy of the note.    Attending Attestation:           Physician Attestation for Scribe:    Physician Attestation Statement for Scribe #2: I, Jarek Conde MD, reviewed documentation, as scribed by Benigno Esquivel in my presence, and it is both accurate and complete. I " also acknowledge and confirm the content of the note done by Terry #1.                 Jarek Conde MD  02/23/24 7318

## 2024-02-24 NOTE — ED NOTES
Reporting 10/10 Left Chest Pain' pain radiating to Mid-Back. MD Leonard notified via secure chat;awaiting orders

## 2024-06-10 ENCOUNTER — HOSPITAL ENCOUNTER (EMERGENCY)
Facility: HOSPITAL | Age: 44
Discharge: HOME | End: 2024-06-10
Attending: EMERGENCY MEDICINE
Payer: MEDICAID

## 2024-06-10 VITALS
SYSTOLIC BLOOD PRESSURE: 128 MMHG | TEMPERATURE: 99 F | DIASTOLIC BLOOD PRESSURE: 82 MMHG | OXYGEN SATURATION: 100 % | HEART RATE: 77 BPM | RESPIRATION RATE: 19 BRPM

## 2024-06-10 DIAGNOSIS — R07.9 CHEST PAIN: ICD-10-CM

## 2024-06-10 DIAGNOSIS — M19.172 POST-TRAUMATIC OSTEOARTHRITIS OF LEFT ANKLE: Primary | ICD-10-CM

## 2024-06-10 DIAGNOSIS — M25.572 LEFT ANKLE PAIN: ICD-10-CM

## 2024-06-10 LAB
ALBUMIN SERPL BCP-MCNC: 3.9 G/DL (ref 3.5–5.2)
ALP SERPL-CCNC: 55 U/L (ref 55–135)
ALT SERPL W/O P-5'-P-CCNC: 19 U/L (ref 10–44)
ANION GAP SERPL CALC-SCNC: 10 MMOL/L (ref 8–16)
AST SERPL-CCNC: 17 U/L (ref 10–40)
BASOPHILS # BLD AUTO: 0.04 K/UL (ref 0–0.2)
BASOPHILS NFR BLD: 0.5 % (ref 0–1.9)
BILIRUB SERPL-MCNC: 0.3 MG/DL (ref 0.1–1)
BNP SERPL-MCNC: 18 PG/ML (ref 0–99)
BUN SERPL-MCNC: 12 MG/DL (ref 6–20)
CALCIUM SERPL-MCNC: 9.4 MG/DL (ref 8.7–10.5)
CHLORIDE SERPL-SCNC: 109 MMOL/L (ref 95–110)
CO2 SERPL-SCNC: 21 MMOL/L (ref 23–29)
CREAT SERPL-MCNC: 0.8 MG/DL (ref 0.5–1.4)
CRP SERPL-MCNC: 0.8 MG/L (ref 0–8.2)
DIFFERENTIAL METHOD BLD: ABNORMAL
EOSINOPHIL # BLD AUTO: 0.2 K/UL (ref 0–0.5)
EOSINOPHIL NFR BLD: 2.3 % (ref 0–8)
ERYTHROCYTE [DISTWIDTH] IN BLOOD BY AUTOMATED COUNT: 14.6 % (ref 11.5–14.5)
EST. GFR  (NO RACE VARIABLE): >60 ML/MIN/1.73 M^2
GLUCOSE SERPL-MCNC: 90 MG/DL (ref 70–110)
HCT VFR BLD AUTO: 39.4 % (ref 37–48.5)
HGB BLD-MCNC: 12.5 G/DL (ref 12–16)
IMM GRANULOCYTES # BLD AUTO: 0.02 K/UL (ref 0–0.04)
IMM GRANULOCYTES NFR BLD AUTO: 0.3 % (ref 0–0.5)
LYMPHOCYTES # BLD AUTO: 2.6 K/UL (ref 1–4.8)
LYMPHOCYTES NFR BLD: 35.5 % (ref 18–48)
MCH RBC QN AUTO: 26.9 PG (ref 27–31)
MCHC RBC AUTO-ENTMCNC: 31.7 G/DL (ref 32–36)
MCV RBC AUTO: 85 FL (ref 82–98)
MONOCYTES # BLD AUTO: 0.6 K/UL (ref 0.3–1)
MONOCYTES NFR BLD: 8.7 % (ref 4–15)
NEUTROPHILS # BLD AUTO: 3.9 K/UL (ref 1.8–7.7)
NEUTROPHILS NFR BLD: 52.7 % (ref 38–73)
NRBC BLD-RTO: 0 /100 WBC
PLATELET # BLD AUTO: 308 K/UL (ref 150–450)
PMV BLD AUTO: 8.8 FL (ref 9.2–12.9)
POTASSIUM SERPL-SCNC: 4 MMOL/L (ref 3.5–5.1)
PROT SERPL-MCNC: 7.5 G/DL (ref 6–8.4)
RBC # BLD AUTO: 4.65 M/UL (ref 4–5.4)
SODIUM SERPL-SCNC: 140 MMOL/L (ref 136–145)
TROPONIN I SERPL DL<=0.01 NG/ML-MCNC: <0.006 NG/ML (ref 0–0.03)
URATE SERPL-MCNC: 5.2 MG/DL (ref 2.4–5.7)
WBC # BLD AUTO: 7.36 K/UL (ref 3.9–12.7)

## 2024-06-10 PROCEDURE — 84550 ASSAY OF BLOOD/URIC ACID: CPT | Performed by: NURSE PRACTITIONER

## 2024-06-10 PROCEDURE — 84484 ASSAY OF TROPONIN QUANT: CPT | Performed by: NURSE PRACTITIONER

## 2024-06-10 PROCEDURE — 83880 ASSAY OF NATRIURETIC PEPTIDE: CPT | Performed by: NURSE PRACTITIONER

## 2024-06-10 PROCEDURE — 86140 C-REACTIVE PROTEIN: CPT | Performed by: NURSE PRACTITIONER

## 2024-06-10 PROCEDURE — 80053 COMPREHEN METABOLIC PANEL: CPT | Performed by: NURSE PRACTITIONER

## 2024-06-10 PROCEDURE — 85025 COMPLETE CBC W/AUTO DIFF WBC: CPT | Performed by: NURSE PRACTITIONER

## 2024-06-10 PROCEDURE — 99285 EMERGENCY DEPT VISIT HI MDM: CPT | Mod: 25

## 2024-06-10 PROCEDURE — 93010 ELECTROCARDIOGRAM REPORT: CPT | Mod: ,,, | Performed by: INTERNAL MEDICINE

## 2024-06-10 PROCEDURE — 93005 ELECTROCARDIOGRAM TRACING: CPT

## 2024-06-10 RX ORDER — MELOXICAM 15 MG/1
15 TABLET ORAL DAILY
Qty: 30 TABLET | Refills: 0 | Status: SHIPPED | OUTPATIENT
Start: 2024-06-10 | End: 2024-07-10

## 2024-06-10 NOTE — FIRST PROVIDER EVALUATION
Medical screening examination initiated.  I have conducted a focused provider triage encounter, findings are as follows:    Brief history of present illness:  Patient presents the ER for left-sided rib and back pain.  Patient also reports left ankle pain and swelling denies injury.    Vitals:    06/10/24 1612   BP: 128/82   BP Location: Right arm   Patient Position: Sitting   Pulse: 77   Resp: 19   Temp: 99.3 °F (37.4 °C)   TempSrc: Oral   SpO2: 100%       Pertinent physical exam:  No acute distress    Brief workup plan:  Labs, EKG, imaging, further eval    Preliminary workup initiated; this workup will be continued and followed by the physician or advanced practice provider that is assigned to the patient when roomed.

## 2024-06-10 NOTE — ED PROVIDER NOTES
Encounter Date: 6/10/2024       History     Chief Complaint   Patient presents with    Ankle Pain     Pt states she has been dealing with left ankle pain/swelling for the past few days. Pt also states she has been dealing with a little left sided rib pain when she takes a deep breath. Pt has hx of asthma     Patient is a 43-year-old female who presents with left ankle pain.  Patient reports previous surgery to the left ankle in 2017.  Denies any new injury to the area.  Reports increasingly worsening pain.  Denies taking any medications for relief of symptoms at home.  No distress noted at this time.       Review of patient's allergies indicates:  No Known Allergies  Past Medical History:   Diagnosis Date    Asthma      Past Surgical History:   Procedure Laterality Date    BREAST LUMPECTOMY Right     HYSTERECTOMY      LEG SURGERY Left 2018    fracture repair    TUBAL LIGATION       Family History   Problem Relation Name Age of Onset    Heart disease Mother      Hypertension Mother      Diabetes Mother       Social History     Tobacco Use    Smoking status: Every Day     Current packs/day: 1.00     Types: Cigarettes    Smokeless tobacco: Never    Tobacco comments:     no smoking after m.n prior to sx   Substance Use Topics    Alcohol use: No    Drug use: No     Review of Systems   Constitutional:  Negative for fever.   HENT:  Negative for sore throat.    Respiratory:  Negative for shortness of breath.    Cardiovascular:  Negative for chest pain.   Gastrointestinal:  Negative for nausea.   Genitourinary:  Negative for dysuria.   Musculoskeletal:  Positive for arthralgias (left ankle). Negative for back pain.   Skin:  Negative for rash.   Neurological:  Negative for weakness.   Hematological:  Does not bruise/bleed easily.       Physical Exam     Initial Vitals [06/10/24 1612]   BP Pulse Resp Temp SpO2   128/82 77 19 99.3 °F (37.4 °C) 100 %      MAP       --         Physical Exam    Nursing note and vitals  reviewed.  Constitutional: She appears well-developed and well-nourished.   HENT:   Head: Normocephalic and atraumatic.   Eyes: EOM are normal. Pupils are equal, round, and reactive to light.   Neck: Neck supple.   Normal range of motion.  Cardiovascular:  Normal rate, regular rhythm, normal heart sounds and intact distal pulses.           Pulmonary/Chest: Breath sounds normal.   Abdominal: Abdomen is soft. Bowel sounds are normal.   Musculoskeletal:         General: Normal range of motion.      Cervical back: Normal range of motion and neck supple.      Left ankle: No swelling or deformity. Tenderness present. Normal range of motion.     Neurological: She is alert and oriented to person, place, and time. She has normal strength and normal reflexes.   Skin: Skin is warm and dry.         ED Course   Procedures  Labs Reviewed   CBC W/ AUTO DIFFERENTIAL - Abnormal; Notable for the following components:       Result Value    MCH 26.9 (*)     MCHC 31.7 (*)     RDW 14.6 (*)     MPV 8.8 (*)     All other components within normal limits   COMPREHENSIVE METABOLIC PANEL - Abnormal; Notable for the following components:    CO2 21 (*)     All other components within normal limits   TROPONIN I   B-TYPE NATRIURETIC PEPTIDE   C-REACTIVE PROTEIN   URIC ACID        ECG Results              EKG 12-lead (Final result)        Collection Time Result Time QRS Duration OHS QTC Calculation    06/10/24 16:46:35 06/11/24 17:02:06 108 442                     Final result by Interface, Lab In [facility] (06/11/24 17:02:10)                   Narrative:    Test Reason : ED    Vent. Rate : 073 BPM     Atrial Rate : 073 BPM     P-R Int : 154 ms          QRS Dur : 108 ms      QT Int : 402 ms       P-R-T Axes : 056 -41 029 degrees     QTc Int : 442 ms    Normal sinus rhythm with sinus arrhythmia  Left axis deviation  Abnormal ECG  When compared with ECG of 23-FEB-2024 19:43,  The axis Shifted left  Confirmed by CATHY GONZALEZ MD ([id number]) on 6/11/2024  5:02:03 PM    Referred By: DAINA   SELF           Confirmed By:CATHY GONZALEZ MD                                  Imaging Results              X-Ray Ankle Complete Left (Final result)  Result time 06/10/24 17:00:29      Final result by Joanne Eisenberg MD (06/10/24 17:00:29)                   Impression:      Distal fibular hardware unchanged alignment without atypia.  No acute fracture or dislocation.  Osteoarthritis mild to moderate..      Electronically signed by: Joanne Eisenberg  Date:    06/10/2024  Time:    17:00               Narrative:    EXAMINATION:  XR ANKLE COMPLETE 3 VIEW LEFT    CLINICAL HISTORY:  XR ANKLE COMPLETE 3 VIEW LEFTPain in left ankle and joints of left foot    COMPARISON:  June 2018                                       X-Ray Chest AP Portable (Final result)  Result time 06/10/24 16:50:50      Final result by Joanne Eisenberg MD (06/10/24 16:50:50)                   Impression:      Under inflation      Electronically signed by: Joanne Eisenberg  Date:    06/10/2024  Time:    16:50               Narrative:    EXAMINATION:  XR CHEST AP PORTABLE    CLINICAL HISTORY:  Chest Pain;    TECHNIQUE:  Single frontal portable view of the chest was performed.    COMPARISON:  February 2000    FINDINGS:  Lungs are underinflated without sizable consolidation or pleural effusion                                       Medications - No data to display  Medical Decision Making  I discussed with patient and/or family/caretaker that evaluation in the ED does not suggest any emergent or life threatening medical conditions requiring immediate intervention beyond what was provided in the ED, and I believe patient is safe for discharge. Regardless, an unremarkable evaluation in the ED does not preclude the development or presence of a serious of life threatening condition. As such, patient was instructed to return immediately for any worsening or change in current symptoms.      Risk  Prescription  drug management.                                      Clinical Impression:  Final diagnoses:  [R07.9] Chest pain  [M25.572] Left ankle pain  [M19.172] Post-traumatic osteoarthritis of left ankle (Primary)          ED Disposition Condition    Discharge Stable          ED Prescriptions       Medication Sig Dispense Start Date End Date Auth. Provider    meloxicam (MOBIC) 15 MG tablet Take 1 tablet (15 mg total) by mouth once daily. 30 tablet 6/10/2024 7/10/2024 Deep Richter NP          Follow-up Information       Follow up With Specialties Details Why Contact Info    Camila Ruby MD Internal Medicine  As needed [facility]  [facility] [address]  [phone]               Deep Richter NP  06/11/24 [id number]

## 2024-06-10 NOTE — Clinical Note
"Janice Colon" Pierre Kerr was seen and treated in our emergency department on 6/10/2024.  She may return to work on 06/12/2024.       If you have any questions or concerns, please don't hesitate to call.      Deep Richter NP"

## 2024-06-10 NOTE — ED NOTES
Patient examined, evaluated, and then educated on discharge instructions and prescriptions by MELLISA Richter without medic assistance. [facility]

## 2024-06-11 LAB
OHS QRS DURATION: 108 MS
OHS QTC CALCULATION: 442 MS

## 2025-03-10 ENCOUNTER — HOSPITAL ENCOUNTER (EMERGENCY)
Facility: HOSPITAL | Age: 45
Discharge: HOME | End: 2025-03-10
Attending: EMERGENCY MEDICINE
Payer: MEDICAID

## 2025-03-10 VITALS
HEART RATE: 64 BPM | TEMPERATURE: 98 F | WEIGHT: 196.19 LBS | RESPIRATION RATE: 16 BRPM | DIASTOLIC BLOOD PRESSURE: 58 MMHG | BODY MASS INDEX: 30.79 KG/M2 | HEIGHT: 67 IN | OXYGEN SATURATION: 96 % | SYSTOLIC BLOOD PRESSURE: 121 MMHG

## 2025-03-10 DIAGNOSIS — R07.9 CHEST PAIN: ICD-10-CM

## 2025-03-10 DIAGNOSIS — M25.512 ACUTE PAIN OF LEFT SHOULDER: Primary | ICD-10-CM

## 2025-03-10 DIAGNOSIS — M54.2 NECK PAIN: ICD-10-CM

## 2025-03-10 DIAGNOSIS — J02.9 SORE THROAT: ICD-10-CM

## 2025-03-10 LAB
GROUP A STREP, MOLECULAR: NEGATIVE
INFLUENZA A, MOLECULAR: NEGATIVE
INFLUENZA B, MOLECULAR: NEGATIVE
OHS QRS DURATION: 100 MS
OHS QTC CALCULATION: 418 MS
SARS-COV-2 RDRP RESP QL NAA+PROBE: NEGATIVE
SPECIMEN SOURCE: NORMAL

## 2025-03-10 PROCEDURE — 25000003 PHARM REV CODE 250: Performed by: REGISTERED NURSE

## 2025-03-10 PROCEDURE — 87651 STREP A DNA AMP PROBE: CPT | Performed by: REGISTERED NURSE

## 2025-03-10 PROCEDURE — 87635 SARS-COV-2 COVID-19 AMP PRB: CPT | Performed by: EMERGENCY MEDICINE

## 2025-03-10 PROCEDURE — 93005 ELECTROCARDIOGRAM TRACING: CPT

## 2025-03-10 PROCEDURE — 93010 ELECTROCARDIOGRAM REPORT: CPT | Mod: ,,, | Performed by: INTERNAL MEDICINE

## 2025-03-10 PROCEDURE — 87502 INFLUENZA DNA AMP PROBE: CPT | Performed by: EMERGENCY MEDICINE

## 2025-03-10 PROCEDURE — 99284 EMERGENCY DEPT VISIT MOD MDM: CPT | Mod: 25

## 2025-03-10 PROCEDURE — 96372 THER/PROPH/DIAG INJ SC/IM: CPT | Performed by: REGISTERED NURSE

## 2025-03-10 PROCEDURE — 63600175 PHARM REV CODE 636 W HCPCS: Performed by: REGISTERED NURSE

## 2025-03-10 RX ORDER — HYDROCODONE BITARTRATE AND ACETAMINOPHEN 5; 325 MG/1; MG/1
1 TABLET ORAL EVERY 6 HOURS PRN
Qty: 12 TABLET | Refills: 0 | Status: SHIPPED | OUTPATIENT
Start: 2025-03-10

## 2025-03-10 RX ORDER — METHOCARBAMOL 500 MG/1
500 TABLET, FILM COATED ORAL 3 TIMES DAILY
Qty: 15 TABLET | Refills: 0 | Status: SHIPPED | OUTPATIENT
Start: 2025-03-10 | End: 2025-03-15

## 2025-03-10 RX ORDER — DEXAMETHASONE SODIUM PHOSPHATE 4 MG/ML
8 INJECTION, SOLUTION INTRA-ARTICULAR; INTRALESIONAL; INTRAMUSCULAR; INTRAVENOUS; SOFT TISSUE
Status: COMPLETED | OUTPATIENT
Start: 2025-03-10 | End: 2025-03-10

## 2025-03-10 RX ORDER — HYDROCODONE BITARTRATE AND ACETAMINOPHEN 10; 325 MG/1; MG/1
1 TABLET ORAL
Refills: 0 | Status: COMPLETED | OUTPATIENT
Start: 2025-03-10 | End: 2025-03-10

## 2025-03-10 RX ADMIN — HYDROCODONE BITARTRATE AND ACETAMINOPHEN 1 TABLET: 10; 325 TABLET ORAL at 11:03

## 2025-03-10 RX ADMIN — DEXAMETHASONE SODIUM PHOSPHATE 8 MG: 4 INJECTION INTRA-ARTICULAR; INTRALESIONAL; INTRAMUSCULAR; INTRAVENOUS; SOFT TISSUE at 12:03

## 2025-03-10 NOTE — ED PROVIDER NOTES
Encounter Date: 3/10/2025       History     Chief Complaint   Patient presents with    General Illness     Sore throat, spasms, sore throat, chills, fever, and body aches for 3 days. -SOB and +CP     44-year-old female presents emergency department with multiple complaints.  Patient complaining of sore throat, chills, fever and body aches.  Patient also complaining of neck spasms that radiates into the anterior portion of the chest.  Patient states he symptoms began 3 days ago.  She denies any weakness, dizziness, nausea/vomiting, diarrhea, cough or any other symptoms.    The history is provided by the patient.     Review of patient's allergies indicates:   Allergen Reactions    Penicillins Hives     Past Medical History:   Diagnosis Date    Asthma      Past Surgical History:   Procedure Laterality Date    BREAST LUMPECTOMY Right     HYSTERECTOMY      LEG SURGERY Left 2018    fracture repair    TUBAL LIGATION       Family History   Problem Relation Name Age of Onset    Heart disease Mother      Hypertension Mother      Diabetes Mother       Social History[1]  Review of Systems   Constitutional:  Positive for activity change, chills and fatigue. Negative for fever.   HENT:  Positive for congestion and sore throat.    Respiratory:  Negative for shortness of breath.    Cardiovascular:  Negative for chest pain.   Gastrointestinal:  Negative for nausea.   Genitourinary:  Negative for dysuria.   Musculoskeletal:  Positive for arthralgias, myalgias and neck pain. Negative for back pain.   Skin:  Negative for rash.   Neurological:  Negative for weakness.   Hematological:  Does not bruise/bleed easily.   All other systems reviewed and are negative.      Physical Exam     Initial Vitals [03/10/25 0938]   BP Pulse Resp Temp SpO2   (!) 121/58 64 18 98.3 °F (36.8 °C) 96 %      MAP       --         Physical Exam    Constitutional: She appears well-developed and well-nourished. She is not diaphoretic. No distress.   HENT:   Head:  Normocephalic and atraumatic.   Eyes: Conjunctivae and EOM are normal. Pupils are equal, round, and reactive to light.   Neck: Neck supple.   Normal range of motion.  Cardiovascular:  Normal rate, regular rhythm and normal heart sounds.           No murmur heard.  Pulmonary/Chest: Breath sounds normal. No respiratory distress. She has no wheezes. She has no rales.   Abdominal: Abdomen is soft. Bowel sounds are normal. There is no abdominal tenderness. There is no rebound and no guarding.   Musculoskeletal:         General: No tenderness or edema. Normal range of motion.      Cervical back: Normal range of motion and neck supple.     Neurological: She is alert and oriented to person, place, and time. No cranial nerve deficit. GCS score is 15. GCS eye subscore is 4. GCS verbal subscore is 5. GCS motor subscore is 6.   Skin: Skin is warm and dry. Capillary refill takes less than 2 seconds.   Psychiatric: She has a normal mood and affect. Thought content normal.         ED Course   Procedures  Labs Reviewed   INFLUENZA A & B BY MOLECULAR       Result Value    Influenza A, Molecular Negative      Influenza B, Molecular Negative      Flu A & B Source Nasal swab     GROUP A STREP, MOLECULAR    Group A Strep, Molecular Negative     SARS-COV-2 RNA AMPLIFICATION, QUAL    SARS-CoV-2 RNA, Amplification, Qual Negative       EKG Readings: (Independently Interpreted)   Initial Reading: No STEMI. Rhythm: Normal Sinus Rhythm. Heart Rate: 67.     ECG Results              EKG 12-lead (In process)        Collection Time Result Time QRS Duration OHS QTC Calculation    03/10/25 09:36:20 03/10/25 10:45:54 100 418                     In process by Interface, Lab In [facility] (03/10/25 10:46:34)                   Narrative:    Test Reason : R07.9,    Vent. Rate :  67 BPM     Atrial Rate :  67 BPM     P-R Int : 166 ms          QRS Dur : 100 ms      QT Int : 396 ms       P-R-T Axes :  43 -23  14 degrees    QTcB Int : 418 ms    Normal sinus  rhythm with sinus arrhythmia  Cannot rule out Anterior infarct ,age undetermined  Abnormal ECG  When compared with ECG of 10-Danny-2024 16:46,  Nonspecific T wave abnormality now evident in Anterior leads    Referred By: AAAREFERRAL SELF           Confirmed By:                       In process by Interface, Lab In [facility] (03/10/25 10:05:00)                   Narrative:    Test Reason : R07.9,    Vent. Rate :  67 BPM     Atrial Rate :  67 BPM     P-R Int : 166 ms          QRS Dur : 100 ms      QT Int : 396 ms       P-R-T Axes :  43 -23  14 degrees    QTcB Int : 418 ms    Normal sinus rhythm with sinus arrhythmia  Cannot rule out Anterior infarct ,age undetermined  Abnormal ECG  When compared with ECG of 10-Danny-2024 16:46,  Nonspecific T wave abnormality now evident in Anterior leads    Referred By: AAAREFERRAL SELF           Confirmed By:                                   Imaging Results              X-Ray Cervical Spine AP And Lateral (Final result)  Result time 03/10/25 11:12:37      Final result by Jl Melendrez MD (03/10/25 11:12:37)                   Impression:      1.  Negative for acute process involving the cervical spine.    2.  Mild degenerative disc changes at C5/C6.      Electronically signed by: Jl Melendrez MD  Date:    03/10/2025  Time:    11:12               Narrative:    EXAMINATION:  XR CERVICAL SPINE AP LATERAL    CLINICAL HISTORY:  Cervicalgia    COMPARISON:  No comparison studies are available.    FINDINGS:  There is normal alignment of the 7 cervical vertebra. Mild disc height reduction with marginal spondylosis at the C5/C6 level.  The vertebral body heights and intervertebral disc heights are otherwise well maintained. The posterior elements are intact and the prevertebral soft tissues are normal thickness. The orientation of the dens and the lateral masses are normal.    The lung apices are clear.                                       Medications   dexAMETHasone injection 8 mg (8 mg  Intramuscular Given 3/10/25 1200)   HYDROcodone-acetaminophen  mg per tablet 1 tablet (1 tablet Oral Given 3/10/25 1159)     Medical Decision Making  Amount and/or Complexity of Data Reviewed  Radiology: ordered.    Risk  Prescription drug management.  Risk Details: I discussed with patient and/or family/caretaker that evaluation in the ED does not suggest any emergent or life threatening medical conditions requiring immediate intervention beyond what was provided in the ED, and I believe patient is safe for discharge.  Regardless, an unremarkable evaluation in the ED does not preclude the development or presence of a serious of life threatening condition. As such, patient was instructed to return immediately for any worsening or change in current symptoms.                                        Clinical Impression:  Final diagnoses:  [R07.9] Chest pain  [M54.2] Neck pain  [M25.512] Acute pain of left shoulder (Primary)  [J02.9] Sore throat          ED Disposition Condition    Discharge Stable          ED Prescriptions       Medication Sig Dispense Start Date End Date Auth. Provider    HYDROcodone-acetaminophen (NORCO) 5-325 mg per tablet Take 1 tablet by mouth every 6 (six) hours as needed for Pain. 12 tablet 3/10/2025 -- Juan C Villalba Jr., FNP    methocarbamoL (ROBAXIN) 500 MG Tab Take 1 tablet (500 mg total) by mouth 3 (three) times daily. for 5 days 15 tablet 3/10/2025 3/15/2025 Juan C Villalba Jr., FNP          Follow-up Information       Follow up With Specialties Details Why Contact Info    Camila Ruby MD Internal Medicine In 1 week  [address] [place] [address] A  [facility] [id number] [phone]                   [1]   Social History  Tobacco Use    Smoking status: Every Day     Current packs/day: 1.00     Types: Cigarettes    Smokeless tobacco: Never    Tobacco comments:     no smoking after m.n prior to sx   Substance Use Topics    Alcohol use: No     Drug use: No        Juan C Villalba Jr., [facility]  03/10/25 [id number]

## 2025-03-10 NOTE — Clinical Note
"Janice Colon" Pierre Kerr was seen and treated in our emergency department on 3/10/2025.  She may return to work on 03/15/2025.       If you have any questions or concerns, please don't hesitate to call.      Juan C Villalba Jr., FNP"

## 2025-07-22 ENCOUNTER — E-CONSULT (OUTPATIENT)
Dept: NEUROLOGY | Facility: HOSPITAL | Age: 45
End: 2025-07-22
Payer: MEDICAID

## 2025-07-22 ENCOUNTER — HOSPITAL ENCOUNTER (OUTPATIENT)
Facility: HOSPITAL | Age: 45
Discharge: HOME | End: 2025-07-23
Admitting: STUDENT IN AN ORGANIZED HEALTH CARE EDUCATION/TRAINING PROGRAM
Payer: MEDICAID

## 2025-07-22 DIAGNOSIS — M50.322 DEGENERATION OF C5-C6 INTERVERTEBRAL DISC: ICD-10-CM

## 2025-07-22 DIAGNOSIS — R20.2 FACIAL TINGLING SENSATION: ICD-10-CM

## 2025-07-22 DIAGNOSIS — M54.2 NECK PAIN: ICD-10-CM

## 2025-07-22 DIAGNOSIS — M25.512 ACUTE PAIN OF LEFT SHOULDER: ICD-10-CM

## 2025-07-22 DIAGNOSIS — R11.0 NAUSEA: ICD-10-CM

## 2025-07-22 DIAGNOSIS — M51.379 DEGENERATION OF INTERVERTEBRAL DISC AT L5-S1 LEVEL: ICD-10-CM

## 2025-07-22 DIAGNOSIS — R07.9 CHEST PAIN: ICD-10-CM

## 2025-07-22 DIAGNOSIS — G89.29 CHRONIC NONINTRACTABLE HEADACHE, UNSPECIFIED HEADACHE TYPE: Primary | ICD-10-CM

## 2025-07-22 DIAGNOSIS — R29.818 FOCAL NEUROLOGICAL SYMPTOM PRESENT: ICD-10-CM

## 2025-07-22 DIAGNOSIS — D32.9 MENINGIOMA: ICD-10-CM

## 2025-07-22 DIAGNOSIS — G37.9 DEMYELINATING DISEASE: ICD-10-CM

## 2025-07-22 DIAGNOSIS — R51.9 CHRONIC NONINTRACTABLE HEADACHE, UNSPECIFIED HEADACHE TYPE: Primary | ICD-10-CM

## 2025-07-22 DIAGNOSIS — R20.2 PARESTHESIAS: Primary | ICD-10-CM

## 2025-07-22 PROBLEM — Z86.73 HISTORY OF ISCHEMIC STROKE: Status: ACTIVE | Noted: 2025-04-28

## 2025-07-22 PROBLEM — G44.59 OTHER COMPLICATED HEADACHE SYNDROME: Status: ACTIVE | Noted: 2025-07-22

## 2025-07-22 PROBLEM — G57.11 MERALGIA PARESTHETICA OF RIGHT SIDE: Status: ACTIVE | Noted: 2021-10-26

## 2025-07-22 PROBLEM — Z72.0 TOBACCO USER: Status: ACTIVE | Noted: 2025-07-22

## 2025-07-22 LAB
ABSOLUTE EOSINOPHIL (OHS): 0.18 K/UL
ABSOLUTE MONOCYTE (OHS): 0.45 K/UL (ref 0.3–1)
ABSOLUTE NEUTROPHIL COUNT (OHS): 3.42 K/UL (ref 1.8–7.7)
ALBUMIN SERPL BCP-MCNC: 3.9 G/DL (ref 3.5–5.2)
ALP SERPL-CCNC: 70 UNIT/L (ref 40–150)
ALT SERPL W/O P-5'-P-CCNC: 30 UNIT/L (ref 10–44)
ANION GAP (OHS): 9 MMOL/L (ref 8–16)
APTT PPP: 27.9 SECONDS (ref 21–32)
AST SERPL-CCNC: 22 UNIT/L (ref 11–45)
BASOPHILS # BLD AUTO: 0.04 K/UL
BASOPHILS NFR BLD AUTO: 0.7 %
BILIRUB SERPL-MCNC: 0.4 MG/DL (ref 0.1–1)
BNP SERPL-MCNC: 35 PG/ML (ref 0–99)
BUN SERPL-MCNC: 7 MG/DL (ref 6–20)
CALCIUM SERPL-MCNC: 9 MG/DL (ref 8.7–10.5)
CHLORIDE SERPL-SCNC: 109 MMOL/L (ref 95–110)
CO2 SERPL-SCNC: 20 MMOL/L (ref 23–29)
CREAT SERPL-MCNC: 0.7 MG/DL (ref 0.5–1.4)
ERYTHROCYTE [DISTWIDTH] IN BLOOD BY AUTOMATED COUNT: 13.3 % (ref 11.5–14.5)
GFR SERPLBLD CREATININE-BSD FMLA CKD-EPI: >60 ML/MIN/1.73/M2
GLUCOSE SERPL-MCNC: 94 MG/DL (ref 70–110)
HCT VFR BLD AUTO: 40.3 % (ref 37–48.5)
HGB BLD-MCNC: 12.7 GM/DL (ref 12–16)
IMM GRANULOCYTES # BLD AUTO: 0.01 K/UL (ref 0–0.04)
IMM GRANULOCYTES NFR BLD AUTO: 0.2 % (ref 0–0.5)
INR PPP: 1 (ref 0.8–1.2)
LYMPHOCYTES # BLD AUTO: 2 K/UL (ref 1–4.8)
MCH RBC QN AUTO: 27 PG (ref 27–31)
MCHC RBC AUTO-ENTMCNC: 31.5 G/DL (ref 32–36)
MCV RBC AUTO: 86 FL (ref 82–98)
NUCLEATED RBC (/100WBC) (OHS): 0 /100 WBC
PLATELET # BLD AUTO: 293 K/UL (ref 150–450)
PMV BLD AUTO: 9.1 FL (ref 9.2–12.9)
POTASSIUM SERPL-SCNC: 4 MMOL/L (ref 3.5–5.1)
PROT SERPL-MCNC: 7.2 GM/DL (ref 6–8.4)
PROTHROMBIN TIME: 11.2 SECONDS (ref 9–12.5)
RBC # BLD AUTO: 4.71 M/UL (ref 4–5.4)
RELATIVE EOSINOPHIL (OHS): 3 %
RELATIVE LYMPHOCYTE (OHS): 32.8 % (ref 18–48)
RELATIVE MONOCYTE (OHS): 7.4 % (ref 4–15)
RELATIVE NEUTROPHIL (OHS): 55.9 % (ref 38–73)
SODIUM SERPL-SCNC: 138 MMOL/L (ref 136–145)
TROPONIN I SERPL DL<=0.01 NG/ML-MCNC: <0.006 NG/ML
TSH SERPL-ACNC: 0.49 UIU/ML (ref 0.4–4)
WBC # BLD AUTO: 6.1 K/UL (ref 3.9–12.7)

## 2025-07-22 PROCEDURE — 25500020 PHARM REV CODE 255: Performed by: STUDENT IN AN ORGANIZED HEALTH CARE EDUCATION/TRAINING PROGRAM

## 2025-07-22 PROCEDURE — 84443 ASSAY THYROID STIM HORMONE: CPT

## 2025-07-22 PROCEDURE — 63600175 PHARM REV CODE 636 W HCPCS: Mod: JZ,TB

## 2025-07-22 PROCEDURE — 83880 ASSAY OF NATRIURETIC PEPTIDE: CPT

## 2025-07-22 PROCEDURE — 99446 NTRPROF PH1/NTRNET/EHR 5-10: CPT | Mod: ,,, | Performed by: PSYCHIATRY & NEUROLOGY

## 2025-07-22 PROCEDURE — G0378 HOSPITAL OBSERVATION PER HR: HCPCS

## 2025-07-22 PROCEDURE — 85610 PROTHROMBIN TIME: CPT

## 2025-07-22 PROCEDURE — 85025 COMPLETE CBC W/AUTO DIFF WBC: CPT

## 2025-07-22 PROCEDURE — A9585 GADOBUTROL INJECTION: HCPCS | Performed by: STUDENT IN AN ORGANIZED HEALTH CARE EDUCATION/TRAINING PROGRAM

## 2025-07-22 PROCEDURE — 84484 ASSAY OF TROPONIN QUANT: CPT

## 2025-07-22 PROCEDURE — 80053 COMPREHEN METABOLIC PANEL: CPT

## 2025-07-22 PROCEDURE — 85730 THROMBOPLASTIN TIME PARTIAL: CPT

## 2025-07-22 PROCEDURE — 25000003 PHARM REV CODE 250: Performed by: NURSE PRACTITIONER

## 2025-07-22 RX ORDER — IBUPROFEN 200 MG
24 TABLET ORAL
Status: DISCONTINUED | OUTPATIENT
Start: 2025-07-22 | End: 2025-07-23 | Stop reason: HOSPADM

## 2025-07-22 RX ORDER — SODIUM CHLORIDE 0.9 % (FLUSH) 0.9 %
10 SYRINGE (ML) INJECTION EVERY 12 HOURS PRN
Status: DISCONTINUED | OUTPATIENT
Start: 2025-07-22 | End: 2025-07-23 | Stop reason: HOSPADM

## 2025-07-22 RX ORDER — NAPROXEN SODIUM 220 MG/1
81 TABLET, FILM COATED ORAL
COMMUNITY
Start: 2025-03-31 | End: 2026-03-31

## 2025-07-22 RX ORDER — METHOCARBAMOL 750 MG/1
750 TABLET, FILM COATED ORAL 3 TIMES DAILY PRN
COMMUNITY
Start: 2025-05-22

## 2025-07-22 RX ORDER — HYDROCODONE BITARTRATE AND ACETAMINOPHEN 10; 325 MG/1; MG/1
1 TABLET ORAL EVERY 6 HOURS PRN
Refills: 0 | Status: DISCONTINUED | OUTPATIENT
Start: 2025-07-22 | End: 2025-07-23 | Stop reason: HOSPADM

## 2025-07-22 RX ORDER — ACETAMINOPHEN 325 MG/1
650 TABLET ORAL EVERY 4 HOURS PRN
Status: DISCONTINUED | OUTPATIENT
Start: 2025-07-22 | End: 2025-07-23 | Stop reason: HOSPADM

## 2025-07-22 RX ORDER — ENOXAPARIN SODIUM 100 MG/ML
40 INJECTION SUBCUTANEOUS EVERY 24 HOURS
Status: DISCONTINUED | OUTPATIENT
Start: 2025-07-22 | End: 2025-07-23 | Stop reason: HOSPADM

## 2025-07-22 RX ORDER — ERGOCALCIFEROL 1.25 MG/1
50000 CAPSULE ORAL
COMMUNITY

## 2025-07-22 RX ORDER — DICLOFENAC SODIUM 75 MG/1
75 TABLET, DELAYED RELEASE ORAL 2 TIMES DAILY
Status: ON HOLD | COMMUNITY
Start: 2025-07-01 | End: 2025-07-23

## 2025-07-22 RX ORDER — ATORVASTATIN CALCIUM 40 MG/1
40 TABLET, FILM COATED ORAL
COMMUNITY

## 2025-07-22 RX ORDER — GLUCAGON 1 MG
1 KIT INJECTION
Status: DISCONTINUED | OUTPATIENT
Start: 2025-07-22 | End: 2025-07-23 | Stop reason: HOSPADM

## 2025-07-22 RX ORDER — NALOXONE HCL 0.4 MG/ML
0.02 VIAL (ML) INJECTION
Status: DISCONTINUED | OUTPATIENT
Start: 2025-07-22 | End: 2025-07-23 | Stop reason: HOSPADM

## 2025-07-22 RX ORDER — ALUMINUM HYDROXIDE, MAGNESIUM HYDROXIDE, AND SIMETHICONE 1200; 120; 1200 MG/30ML; MG/30ML; MG/30ML
30 SUSPENSION ORAL 4 TIMES DAILY PRN
Status: DISCONTINUED | OUTPATIENT
Start: 2025-07-22 | End: 2025-07-23 | Stop reason: HOSPADM

## 2025-07-22 RX ORDER — KETOROLAC TROMETHAMINE 30 MG/ML
15 INJECTION, SOLUTION INTRAMUSCULAR; INTRAVENOUS
Status: COMPLETED | OUTPATIENT
Start: 2025-07-22 | End: 2025-07-22

## 2025-07-22 RX ORDER — HYDROCODONE BITARTRATE AND ACETAMINOPHEN 5; 325 MG/1; MG/1
1 TABLET ORAL EVERY 6 HOURS PRN
Refills: 0 | Status: DISCONTINUED | OUTPATIENT
Start: 2025-07-22 | End: 2025-07-23 | Stop reason: HOSPADM

## 2025-07-22 RX ORDER — TALC
6 POWDER (GRAM) TOPICAL NIGHTLY PRN
Status: DISCONTINUED | OUTPATIENT
Start: 2025-07-22 | End: 2025-07-23 | Stop reason: HOSPADM

## 2025-07-22 RX ORDER — PROCHLORPERAZINE EDISYLATE 5 MG/ML
5 INJECTION INTRAMUSCULAR; INTRAVENOUS EVERY 6 HOURS PRN
Status: DISCONTINUED | OUTPATIENT
Start: 2025-07-22 | End: 2025-07-23 | Stop reason: HOSPADM

## 2025-07-22 RX ORDER — POLYETHYLENE GLYCOL 3350 17 G/17G
17 POWDER, FOR SOLUTION ORAL 2 TIMES DAILY PRN
Status: DISCONTINUED | OUTPATIENT
Start: 2025-07-22 | End: 2025-07-23 | Stop reason: HOSPADM

## 2025-07-22 RX ORDER — GADOBUTROL 604.72 MG/ML
10 INJECTION INTRAVENOUS
Status: COMPLETED | OUTPATIENT
Start: 2025-07-22 | End: 2025-07-22

## 2025-07-22 RX ORDER — ONDANSETRON HYDROCHLORIDE 2 MG/ML
4 INJECTION, SOLUTION INTRAVENOUS EVERY 6 HOURS PRN
Status: DISCONTINUED | OUTPATIENT
Start: 2025-07-22 | End: 2025-07-23 | Stop reason: HOSPADM

## 2025-07-22 RX ORDER — IBUPROFEN 200 MG
16 TABLET ORAL
Status: DISCONTINUED | OUTPATIENT
Start: 2025-07-22 | End: 2025-07-23 | Stop reason: HOSPADM

## 2025-07-22 RX ADMIN — GADOBUTROL 9 ML: 604.72 INJECTION INTRAVENOUS at 06:07

## 2025-07-22 RX ADMIN — KETOROLAC TROMETHAMINE 15 MG: 30 INJECTION, SOLUTION INTRAMUSCULAR at 11:07

## 2025-07-22 RX ADMIN — HYDROCODONE BITARTRATE AND ACETAMINOPHEN 1 TABLET: 10; 325 TABLET ORAL at 07:07

## 2025-07-22 NOTE — HPI
"44 y.o. female, comorbid conditions include Meningioma, Demyelinating Lesion, CVA. asthma who presents to the Emergency Department for evaluation of multiple complaints which began 2 weeks ago. Pt was admitted to [facility] on 3/23/25 and evaluated by the Neurology department for similar symptoms. The patient was diagnosed with a Meningioma and advised to follow up with neurology as outpatient, the follow-up appointment was scheduled for 8/3/2025. In addition to the meningioma, patient was dx with a CVA and MRI C/T/L spine which showed upper cervical cord enhancing lesion, degenerative changes at C5-6 and degenerative changes at L5-S1 during that admission. Pt reports that for 2 weeks she has been experiencing a pressure like headache, blurred vision, neck pain, nausea and shoulder pain. Pt notes concern when both sides of her face started tingling and her ears felt "full and hot" which started 2 days ago. She states that she is compliant with all of her medications. Pt admits to smoking and using 500 mg of Tylenol 3x a day.  Patient denies any numbness, fever, chills or urinary incontinence. In the ED, vitals: 119/83, 79, 16, 98.7F, 100% RA on arrival. Labs normal. CT Head: NAF, Stable extra-axial 1.6 cm based mass along the right lateral convexity, most consistent with a meningioma. Case discussed with Neurology, recommended to observe and obtain repeat MRI of Brain, C-Spine with demyelination protocol.   After evaluating the patients clinical presentation, vitals, labs, and risk factors, the attending physician and I decided to observe the patient for monitoring, diagnostics, and treatment due to the risk of deterioration if managed outpatient.  Patient is a full code. Placed in observation for further management of Headache, Focal Neurological Changes.   "

## 2025-07-22 NOTE — ASSESSMENT & PLAN NOTE
C/O facial numbness and tingling for previous 2 weeks, reports worsening  Patient has a known meningioma, demyelinating lesion. Treated with IVIG and steroid 3/2025 at [facility] with improvement in sx.   Neurology e-consult- recommended to repeat MRI's    --Neurology consulted  --MRI brain, C-Spine and L-Spine- demyelination protocol

## 2025-07-22 NOTE — ASSESSMENT & PLAN NOTE
Unclear etiology, suspected related to previous CVA, possible demyelinating disease, meningioma    --MRI Brain  --Pain control per MAR

## 2025-07-22 NOTE — H&P
"  [facility] - Emergency Dept.  [facility] Medicine  History & Physical    Patient Name: Janice Kerr  MRN: [id number]  Patient Class: OP- Observation  Admission Date: 7/22/2025  Attending Physician: Rajesh Rousseau,*   Primary Care Provider: Camila Ruby MD         Patient information was obtained from patient, past medical records, and ER records.     Subjective:     Principal Problem:Focal neurological symptom present    Chief Complaint:   Chief Complaint   Patient presents with    Chest Pain     Pt reports chest pain and tingling of arms, face, and ears for the past 2 weeks. Pt also c/o neck pain. States had a stroke in March and symptoms feels similar.         HPI: 44 y.o. female, comorbid conditions include Meningioma, Demyelinating Lesion, CVA. asthma who presents to the Emergency Department for evaluation of multiple complaints which began 2 weeks ago. Pt was admitted to [facility] on 3/23/25 and evaluated by the Neurology department for similar symptoms. The patient was diagnosed with a Meningioma and advised to follow up with neurology as outpatient, the follow-up appointment was scheduled for 8/3/2025. In addition to the meningioma, patient was dx with a CVA and MRI C/T/L spine which showed upper cervical cord enhancing lesion, degenerative changes at C5-6 and degenerative changes at L5-S1 during that admission. Pt reports that for 2 weeks she has been experiencing a pressure like headache, blurred vision, neck pain, nausea and shoulder pain. Pt notes concern when both sides of her face started tingling and her ears felt "full and hot" which started 2 days ago. She states that she is compliant with all of her medications. Pt admits to smoking and using 500 mg of Tylenol 3x a day.  Patient denies any numbness, fever, chills or urinary incontinence. In the ED, vitals: 119/83, 79, 16, 98.7F, 100% RA on arrival. Labs normal. CT Head: NAF, Stable extra-axial 1.6 cm based mass along the " right lateral convexity, most consistent with a meningioma. Case discussed with Neurology, recommended to observe and obtain repeat MRI of Brain, C-Spine with demyelination protocol.   After evaluating the patients clinical presentation, vitals, labs, and risk factors, the attending physician and I decided to observe the patient for monitoring, diagnostics, and treatment due to the risk of deterioration if managed outpatient.  Patient is a full code. Placed in observation for further management of Headache, Focal Neurological Changes.     Past Medical History:   Diagnosis Date    Asthma        Past Surgical History:   Procedure Laterality Date    BREAST LUMPECTOMY Right     HYSTERECTOMY      LEG SURGERY Left 2018    fracture repair    TUBAL LIGATION         Review of patient's allergies indicates:   Allergen Reactions    Penicillins Hives       No current facility-administered medications on file prior to encounter.     Current Outpatient Medications on File Prior to Encounter   Medication Sig    aspirin 81 MG Chew Take 81 mg by mouth.    diclofenac (VOLTAREN) 75 MG EC tablet Take 75 mg by mouth 2 (two) times daily.    methocarbamoL (ROBAXIN) 750 MG Tab Take 750 mg by mouth 3 (three) times daily as needed (Muscle Spasms).    acetaminophen (TYLENOL) 500 MG tablet Take 500 mg by mouth every 6 (six) hours as needed for Pain.    atorvastatin (LIPITOR) 40 MG tablet Take 40 mg by mouth.    dextromethorphan-guaifenesin  mg/5 ml (ROBITUSSIN-DM)  mg/5 mL liquid Take 5 mLs by mouth every 6 (six) hours as needed (cough).    ergocalciferol (ERGOCALCIFEROL) 50,000 unit Cap Take 50,000 Units by mouth every 7 days.    HYDROcodone-acetaminophen (NORCO) 5-325 mg per tablet Take 1 tablet by mouth every 6 (six) hours as needed for Pain.    ibuprofen (ADVIL,MOTRIN) 800 MG tablet Take 1 tablet (800 mg total) by mouth every 6 (six) hours as needed for Pain.    methylPREDNISolone (MEDROL DOSEPACK) 4 mg tablet Take as directed     traMADol (ULTRAM) 50 mg tablet Take 1 tablet (50 mg total) by mouth every 6 (six) hours as needed for Pain.    traMADoL (ULTRAM) 50 mg tablet Take 1 tablet (50 mg total) by mouth every 6 (six) hours as needed for Pain.     Family History       Problem Relation (Age of Onset)    Diabetes Mother    Heart disease Mother    Hypertension Mother          Tobacco Use    Smoking status: Every Day     Current packs/day: 1.00     Types: Cigarettes    Smokeless tobacco: Never    Tobacco comments:     no smoking after m.n prior to sx   Substance and Sexual Activity    Alcohol use: No    Drug use: No    Sexual activity: Yes     Partners: Male     Review of Systems   Constitutional:  Positive for activity change, appetite change and fatigue.   Eyes:  Positive for pain.   Gastrointestinal:  Positive for nausea.   Neurological:  Positive for weakness, numbness (facial numbness and tingling) and headaches.   All other systems reviewed and are negative.    Objective:     Vital Signs (Most Recent):  Temp: 98.7 °F (37.1 °C) (07/22/25 0950)  Pulse: (!) 51 (07/22/25 1315)  Resp: 20 (07/22/25 1315)  BP: 109/64 (07/22/25 1315)  SpO2: 100 % (07/22/25 1315) Vital Signs (24h Range):  Temp:  [98.7 °F (37.1 °C)] 98.7 °F (37.1 °C)  Pulse:  [51-79] 51  Resp:  [16-20] 20  SpO2:  [98 %-100 %] 100 %  BP: ()/(64-83) 109/64        There is no height or weight on file to calculate BMI.     Physical Exam  Vitals and nursing note reviewed.   Constitutional:       General: She is not in acute distress.     Appearance: She is well-developed. She is ill-appearing.   HENT:      Head: Normocephalic and atraumatic.      Right Ear: Hearing and external ear normal.      Left Ear: Hearing and external ear normal.      Nose: No rhinorrhea.      Right Sinus: No maxillary sinus tenderness or frontal sinus tenderness.      Left Sinus: No maxillary sinus tenderness or frontal sinus tenderness.      Mouth/Throat:      Mouth: No oral lesions.      Pharynx:  Uvula midline.   Eyes:      General:         Right eye: No discharge.         Left eye: No discharge.      Conjunctiva/sclera: Conjunctivae normal.      Pupils: Pupils are equal, round, and reactive to light.   Neck:      Thyroid: No thyromegaly.      Vascular: No carotid bruit.      Trachea: No tracheal deviation.   Cardiovascular:      Rate and Rhythm: Normal rate and regular rhythm.      Pulses:           Dorsalis pedis pulses are 2+ on the right side and 2+ on the left side.      Heart sounds: Normal heart sounds, S1 normal and S2 normal. No murmur heard.  Pulmonary:      Effort: Pulmonary effort is normal. No respiratory distress.      Breath sounds: Normal breath sounds.   Abdominal:      General: Bowel sounds are normal. There is no distension.      Palpations: Abdomen is soft. There is no mass.      Tenderness: There is no abdominal tenderness.   Musculoskeletal:         General: Normal range of motion.      Cervical back: Normal range of motion.   Lymphadenopathy:      Cervical: No cervical adenopathy.      Upper Body:      Right upper body: No supraclavicular adenopathy.      Left upper body: No supraclavicular adenopathy.   Skin:     General: Skin is warm and dry.      Capillary Refill: Capillary refill takes less than 2 seconds.      Findings: No rash.   Neurological:      Mental Status: She is alert and oriented to person, place, and time.      Sensory: Sensory deficit present.      Coordination: Coordination normal.      Gait: Gait normal.   Psychiatric:         Mood and Affect: Mood is not anxious or depressed.         Speech: Speech normal.         Behavior: Behavior normal.         Thought Content: Thought content normal.         Judgment: Judgment normal.              CRANIAL NERVES     CN III, IV, VI   Pupils are equal, round, and reactive to light.       Significant Labs: All pertinent labs within the past 24 hours have been reviewed.  CBC:   Recent Labs   Lab 07/22/25  1150   WBC 6.10   HGB 12.7    HCT 40.3        CMP:   Recent Labs   Lab 07/22/25  1101      K 4.0      CO2 20*   GLU 94   BUN 7   CREATININE 0.7   CALCIUM 9.0   PROT 7.2   ALBUMIN 3.9   BILITOT 0.4   ALKPHOS 70   AST 22   ALT 30   ANIONGAP 9       Significant Imaging: I have reviewed all pertinent imaging results/findings within the past 24 hours.  Assessment/Plan:     Assessment & Plan  Focal neurological symptom present  C/O facial numbness and tingling for previous 2 weeks, reports worsening  Patient has a known meningioma, demyelinating lesion. [facility] and [facility] with [facility] in sx.   Neurology e-consult- recommended to repeat MRI's    --Neurology consulted  --MRI brain, C-Spine and L-Spine- demyelination protocol    Degeneration of C5-C6 intervertebral disc  Previous finding of upper cervical cord enhancing lesion. Patient reports continued pain to BUE, shoulders, tingling, weakness    --MRI C-spine as ordered  Degeneration of intervertebral disc at L5-S1 level    See as above  History of ischemic stroke  3/2025 acute CVA    --Cont ASA    Meningioma  Incidental finding 3/2025.     --Repeat MRI  Meralgia paresthetica of right side    Secondary to previous CVA    --Fall risk  --PT/OT in AM  Other complicated headache syndrome  Unclear etiology, suspected related to previous CVA, possible demyelinating disease, meningioma    --MRI Brain  --Pain control per MAR      VTE Risk Mitigation (From admission, onward)           Ordered     enoxaparin injection 40 mg  Daily         07/22/25 1413     IP VTE HIGH RISK PATIENT  Once         07/22/25 1413     Place sequential compression device  Until discontinued         07/22/25 1413                                     On 07/22/2025, patient should be placed in hospital observation services under my care in collaboration with Rajesh Rousseau MD.           Soila Bucio NP  Department of Hospital Medicine  [facility] - Emergency Dept.

## 2025-07-22 NOTE — ASSESSMENT & PLAN NOTE
Previous finding of upper cervical cord enhancing lesion. Patient reports continued pain to BUE, shoulders, tingling, weakness    --MRI C-spine as ordered

## 2025-07-22 NOTE — ED PROVIDER NOTES
"SCRIBE #1 NOTE: I, Sienna Epperson, am scribing for, and in the presence of, Tiana Verdugo DO. I have scribed the entire note.       History     Chief Complaint   Patient presents with    Chest Pain     Pt reports chest pain and tingling of arms, face, and ears for the past 2 weeks. Pt also c/o neck pain. States had a stroke in March and symptoms feels similar.      Review of patient's allergies indicates:   Allergen Reactions    Penicillins Hives         History of Present Illness     HPI    7/22/2025, 10:42 AM  History obtained from the patient and medical records      History of Present Illness: Janice Kerr is a 44 y.o. female patient with a PMHx of asthma who presents to the Emergency Department for evaluation of multiple complaints which began 2 weeks ago. Pt was admitted to [facility] on 3/23 and evaluated by the Neurology department for similar symptoms. The patient was diagnosed with a Meningioma and advised to follow up with neurology. Pt reports that for 2 weeks she has been experiencing a pressure like headache, blurred vision, neck pain, and shoulder pain. Pt notes concern when both sides of her face started tingling and her ears felt "full and hot" which started 2 days ago. She states that she is compliant with all of her medications. Pt admits to smoking and using 500 mg of Tylenol 3x a day.  Patient denies any numbness, fever, chills or urinary incontinence. Prior Tx includes Tylenol as previously mentioned.  No further complaints or concerns at this time.       Arrival mode: Personal Transportation    PCP: Camila Ruby MD        Past Medical History:  Past Medical History:   Diagnosis Date    Asthma        Past Surgical History:  Past Surgical History:   Procedure Laterality Date    BREAST LUMPECTOMY Right     HYSTERECTOMY      LEG SURGERY Left 2018    fracture repair    TUBAL LIGATION           Family History:  Family History   Problem Relation Name Age of Onset    Heart " disease Mother      Hypertension Mother      Diabetes Mother         Social History:  Social History     Tobacco Use    Smoking status: Every Day     Current packs/day: 1.00     Types: Cigarettes    Smokeless tobacco: Never    Tobacco comments:     no smoking after m.n prior to sx   Substance and Sexual Activity    Alcohol use: No    Drug use: No    Sexual activity: Yes     Partners: Male        Review of Systems     Review of Systems   Constitutional:  Negative for chills and fever.   HENT:  Positive for ear pain.    Eyes:  Positive for visual disturbance.   Genitourinary:         (-) urinary incontinence       Musculoskeletal:  Positive for myalgias and neck pain.   Neurological:  Positive for headaches. Negative for numbness.        (+) paresthesia (facial)      Physical Exam     Initial Vitals [07/22/25 0950]   BP Pulse Resp Temp SpO2   119/83 79 16 98.7 °F (37.1 °C) 100 %      MAP       --          Physical Exam  Nursing Notes and Vital Signs Reviewed.  Constitutional: Patient is in no acute distress. Well-developed and well-nourished.  Head: Atraumatic. Normocephalic.  Eyes: PERRL. EOM intact. Conjunctivae are not pale. No scleral icterus.  ENT: Mucous membranes are moist. Oropharynx is clear and symmetric.    Neck: Supple. Full ROM. No lymphadenopathy.  Cardiovascular: Regular rate. Regular rhythm. No murmurs, rubs, or gallops. Distal pulses are 2+ and symmetric.  Pulmonary/Chest: No respiratory distress. Clear to auscultation bilaterally. No wheezing or rales.  Abdominal: Soft and non-distended. There is no tenderness .  No rebound, guarding, or rigidity. Good bowel sounds.  Genitourinary: No CVA tenderness.  Musculoskeletal: Moves all extremities. No obvious deformities. No edema. No calf tenderness.  Skin: Warm and dry.  Neurological:  Alert, awake, and appropriate. No acute focal neurological deficits are appreciated. Cranial nerves 2-12 intact. 5/5 bicep and tricep strength bilaterally. Speech clear.  "Face symmetric.   Psychiatric: Normal affect. Good eye contact. Appropriate in content.     ED Course   Procedures  ED Vital Signs:  Vitals:    07/22/25 2119 07/22/25 2300 07/23/25 0021 07/23/25 0100   BP:   (!) 102/54    Pulse:  (!) 50 (!) 51 68   Resp:   18    Temp:   98.7 °F (37.1 °C)    TempSrc:       SpO2:   100%    Weight: 93 kg (205 lb)      Height: 5' 7" (1.702 m)       07/23/25 0300 07/23/25 0400 07/23/25 0457 07/23/25 0500   BP:   (!) 100/54    Pulse: (!) 49 (!) 50 (!) 49 72   Resp:   18    Temp:   98.1 °F (36.7 °C)    TempSrc:       SpO2:   97%    Weight:   90.9 kg (200 lb 6.4 oz)    Height:        07/23/25 0730 07/23/25 0740 07/23/25 0800 07/23/25 0952   BP:  (!) 106/55     Pulse: 70 (!) 57 70 62   Resp:  15     Temp:  98.5 °F (36.9 °C)     TempSrc:  Oral     SpO2:  98%     Weight:       Height:        07/23/25 1101 07/23/25 1130 07/23/25 1205   BP:   110/64   Pulse:  74 76   Resp: 16  17   Temp:   98.7 °F (37.1 °C)   TempSrc:   Oral   SpO2:   99%   Weight:      Height:          Abnormal Lab Results:  Labs Reviewed   COMPREHENSIVE METABOLIC PANEL - Abnormal       Result Value    Sodium 138      Potassium 4.0      Chloride 109      CO2 20 (*)     Glucose 94      BUN 7      Creatinine 0.7      Calcium 9.0      Protein Total 7.2      Albumin 3.9      Bilirubin Total 0.4      ALP 70      AST 22      ALT 30      Anion Gap 9      eGFR >60     CBC WITH DIFFERENTIAL - Abnormal    WBC 6.10      RBC 4.71      HGB 12.7      HCT 40.3      MCV 86      MCH 27.0      MCHC 31.5 (*)     RDW 13.3      Platelet Count 293      MPV 9.1 (*)     Nucleated RBC 0      Neut % 55.9      Lymph % 32.8      Mono % 7.4      Eos % 3.0      Basophil % 0.7      Imm Grans % 0.2      Neut # 3.42      Lymph # 2.00      Mono # 0.45      Eos # 0.18      Baso # 0.04      Imm Grans # 0.01     PROTIME-INR - Normal    PT 11.2      INR 1.0     APTT - Normal    PTT 27.9     TROPONIN I - Normal    Troponin-I <0.006     B-TYPE NATRIURETIC PEPTIDE - " Normal    BNP 35     CBC W/ AUTO DIFFERENTIAL    Narrative:     The following orders were created for panel order CBC auto differential.  Procedure                               Abnormality         Status                     ---------                               -----------         ------                     CBC with Differential[[id number]]       Abnormal            Final result                 Please view results for these tests on the individual orders.        All Lab Results:  Results for orders placed or performed during the hospital encounter of 07/22/25   Comprehensive metabolic panel    Collection Time: 07/22/25 11:01 AM   Result Value Ref Range    Sodium 138 136 - 145 mmol/L    Potassium 4.0 3.5 - 5.1 mmol/L    Chloride 109 95 - 110 mmol/L    CO2 20 (L) 23 - 29 mmol/L    Glucose 94 70 - 110 mg/dL    BUN 7 6 - 20 mg/dL    Creatinine 0.7 0.5 - 1.4 mg/dL    Calcium 9.0 8.7 - 10.5 mg/dL    Protein Total 7.2 6.0 - 8.4 gm/dL    Albumin 3.9 3.5 - 5.2 g/dL    Bilirubin Total 0.4 0.1 - 1.0 mg/dL    ALP 70 40 - 150 unit/L    AST 22 11 - 45 unit/L    ALT 30 10 - 44 unit/L    Anion Gap 9 8 - 16 mmol/L    eGFR >60 >60 mL/min/1.73/m2   TSH    Collection Time: 07/22/25 11:01 AM   Result Value Ref Range    TSH 0.493 0.400 - 4.000 uIU/mL   Troponin I    Collection Time: 07/22/25 11:01 AM   Result Value Ref Range    Troponin-I <0.006 <=0.026 ng/mL   Brain natriuretic peptide    Collection Time: 07/22/25 11:01 AM   Result Value Ref Range    BNP 35 0 - 99 pg/mL   Protime-INR    Collection Time: 07/22/25 11:50 AM   Result Value Ref Range    PT 11.2 9.0 - 12.5 seconds    INR 1.0 0.8 - 1.2   APTT    Collection Time: 07/22/25 11:50 AM   Result Value Ref Range    PTT 27.9 21.0 - 32.0 seconds   CBC with Differential    Collection Time: 07/22/25 11:50 AM   Result Value Ref Range    WBC 6.10 3.90 - 12.70 K/uL    RBC 4.71 4.00 - 5.40 M/uL    HGB 12.7 12.0 - 16.0 gm/dL    HCT 40.3 37.0 - 48.5 %    MCV 86 82 - 98 fL    MCH 27.0 27.0 -  31.0 pg    MCHC 31.5 (L) 32.0 - 36.0 g/dL    RDW 13.3 11.5 - 14.5 %    Platelet Count 293 150 - 450 K/uL    MPV 9.1 (L) 9.2 - 12.9 fL    Nucleated RBC 0 <=0 /100 WBC    Neut % 55.9 38 - 73 %    Lymph % 32.8 18 - 48 %    Mono % 7.4 4 - 15 %    Eos % 3.0 <=8 %    Basophil % 0.7 <=1.9 %    Imm Grans % 0.2 0.0 - 0.5 %    Neut # 3.42 1.8 - 7.7 K/uL    Lymph # 2.00 1 - 4.8 K/uL    Mono # 0.45 0.3 - 1 K/uL    Eos # 0.18 <=0.5 K/uL    Baso # 0.04 <=0.2 K/uL    Imm Grans # 0.01 0.00 - 0.04 K/uL   Comprehensive Metabolic Panel (CMP)    Collection Time: 07/23/25  5:47 AM   Result Value Ref Range    Sodium 139 136 - 145 mmol/L    Potassium 3.9 3.5 - 5.1 mmol/L    Chloride 110 95 - 110 mmol/L    CO2 22 (L) 23 - 29 mmol/L    Glucose 89 70 - 110 mg/dL    BUN 10 6 - 20 mg/dL    Creatinine 0.7 0.5 - 1.4 mg/dL    Calcium 9.2 8.7 - 10.5 mg/dL    Protein Total 7.0 6.0 - 8.4 gm/dL    Albumin 3.7 3.5 - 5.2 g/dL    Bilirubin Total 0.3 0.1 - 1.0 mg/dL    ALP 69 40 - 150 unit/L    AST 16 11 - 45 unit/L    ALT 25 10 - 44 unit/L    Anion Gap 7 (L) 8 - 16 mmol/L    eGFR >60 >60 mL/min/1.73/m2   Magnesium    Collection Time: 07/23/25  5:47 AM   Result Value Ref Range    Magnesium  1.7 1.6 - 2.6 mg/dL   CBC with Differential    Collection Time: 07/23/25  5:47 AM   Result Value Ref Range    WBC 5.73 3.90 - 12.70 K/uL    RBC 4.36 4.00 - 5.40 M/uL    HGB 12.0 12.0 - 16.0 gm/dL    HCT 37.2 37.0 - 48.5 %    MCV 85 82 - 98 fL    MCH 27.5 27.0 - 31.0 pg    MCHC 32.3 32.0 - 36.0 g/dL    RDW 13.3 11.5 - 14.5 %    Platelet Count 279 150 - 450 K/uL    MPV 8.8 (L) 9.2 - 12.9 fL    Nucleated RBC 0 <=0 /100 WBC    Neut % 49.8 38 - 73 %    Lymph % 36.5 18 - 48 %    Mono % 8.6 4 - 15 %    Eos % 4.2 <=8 %    Basophil % 0.7 <=1.9 %    Imm Grans % 0.2 0.0 - 0.5 %    Neut # 2.86 1.8 - 7.7 K/uL    Lymph # 2.09 1 - 4.8 K/uL    Mono # 0.49 0.3 - 1 K/uL    Eos # 0.24 <=0.5 K/uL    Baso # 0.04 <=0.2 K/uL    Imm Grans # 0.01 0.00 - 0.04 K/uL       Imaging  Results:  Imaging Results              MRI Cervical Spine Demyelinating W W/O Contrast (Final result)  Result time 07/23/25 08:22:17      Final result by Brynn Osorio MD (07/23/25 08:22:17)                   Impression:      1.    3.2 x 0.8 cm white matter lesion in the anterior medulla/upper cervical cord at the level of C1 and questionable scattered increased T2 signal throughout the thoracic spine highly suggestive of demyelinating disease.  2.    No abnormal postcontrast enhancement.  3.    Left paracentral disc protrusion/extrusion with moderate spinal canal stenosis and severe bilateral neuroforaminal narrowing at C5-6.    Finalized on: 7/23/2025 8:22 AM By:  Brynn Osorio MD  [facility]# [id number]      2025-07-23 08:24:18.587     [facility]               Narrative:    PROCEDURE:  MRI CERVICAL SPINE DEMYELINATING W W/O CONTRAST, MRI THORACIC SPINE DEMYELINATING W W/O CONTRAST    INDICATION:  Demyelinating lesion    TECHNIQUE: MRI CERVICAL SPINE DEMYELINATING W W/O CONTRAST, MRI THORACIC SPINE DEMYELINATING W W/O CONTRAST.    .  COMPARISON: None available.    FINDINGS:  Alignment is normal.  No evidence of aggressive osseous lesion or acute compression deformity.  Vertebral body heights are maintained.  Mild disc height loss at C5-6.  There is a 3.2 x 0.8 cm (C and AP dimensions) T2 STIR hyperintense lesion in the anterior medulla/upper cervical cord at the level of C1.  No evidence of abnormal focal enhancement.    C2-3: No significant thecal sac or foraminal stenosis.    C3-4: No significant thecal sac or foraminal stenosis.    C4-5: Trace disc bulge with mild spinal canal stenosis.  No significant neural foraminal narrowing.    C5-6: Small disc osteophyte complex with left paracentral disc protrusion/extrusion that indents upon the cord.  Moderate spinal canal stenosis with severe bilateral neural foraminal narrowing.    C6-7: No significant thecal sac or foraminal stenosis.    C7-T1: No significant thecal sac or foraminal  stenosis.    Paravertebral soft tissues are unremarkable.    Thoracic spine:  Vertebral body height is normal and alignment is maintained.  Marrow signal is within normal limits.  Disc spaces are relatively maintained.  Questionable scattered increased T2 STIR signal in the cord at the level of T2, T5-6, T7, T8-9,  No significant spinal canal stenosis or neuroforaminal narrowing.  No abnormal enhancement.                                         MRI Brain Demyelinating W W/O Contrast (Final result)  Result time 07/23/25 07:56:58      Final result by Oskar Garcia MD (07/23/25 07:56:58)                   Impression:      1.    Multiple white matter lesions involving the subcortical and periventricular white matter, middle cerebellar peduncles, chidi, and brainstem/spinal cord, nonspecific but most suggestive of demyelinating disease in the clinical setting of multiple sclerosis.  Enhancing lesions in the right parietal periventricular white matter and in the left parietal subcortical white matter suggesting active demyelination.  2.    Small right frontal convexity meningioma.    Finalized on: 7/23/2025 7:56 AM By:  Oskar Garcia MD  [facility]# [id number]      2025-07-23 07:59:06.758     [facility]               Narrative:    EXAM:  MRI BRAIN DEMYELINATING W/ WO CONTRAST    TECHNIQUE: Multiplanar multisequence MRI of the brain without and with administration of gadolinium based intravenous contrast.    INDICATION:  h/o c spine lesion concerning for demyelinating lesion.    COMPARISON:  None.    FINDINGS:  Multiple periventricular and subcortical white matter FLAIR hyperintense lesions, including those involving the corpus callosum and at the callosal margin.  Additional lesions present within the middle cerebellar peduncles, chidi, and the medulla/spinal cord.  The dominant lesion in the right parietal periventricular white matter with facilitated diffusion and contrast enhancement and smaller enhancing lesion with facilitative  diffusion and enhancement in the left subcortical parietal white matter, most suggestive of active demyelination.    Homogenously enhancing extra-axial dural based mass along the right frontal convexity measuring 1.3 (ap) x 1.0 (trv) x 1.8 (cc) cm, compatible with meningioma.  Minor local mass effect.  No adjacent parenchymal edema.  No midline shift.    No evidence of acute infarct. No evidence of intraparenchymal hemorrhage. Maintained gray-white matter differentiation.  Normal size and configuration of the ventricular system. Preserved central vascular flow voids.    Normal orbits. No significant signal abnormality within the mastoid air cells or paranasal sinuses. No skull or scalp abnormality.                                         MRI Thoracic Spine Demyelinating W W/O Contrast (Final result)  Result time 07/23/25 08:22:17      Final result by Brynn Osorio MD (07/23/25 08:22:17)                   Impression:      1.    3.2 x 0.8 cm white matter lesion in the anterior medulla/upper cervical cord at the level of C1 and questionable scattered increased T2 signal throughout the thoracic spine highly suggestive of demyelinating disease.  2.    No abnormal postcontrast enhancement.  3.    Left paracentral disc protrusion/extrusion with moderate spinal canal stenosis and severe bilateral neuroforaminal narrowing at C5-6.    Finalized on: 7/23/2025 8:22 AM By:  Brynn Osorio MD  [facility]# [id number]      2025-07-23 08:24:18.571     [facility]               Narrative:    PROCEDURE:  MRI CERVICAL SPINE DEMYELINATING W W/O CONTRAST, MRI THORACIC SPINE DEMYELINATING W W/O CONTRAST    INDICATION:  Demyelinating lesion    TECHNIQUE: MRI CERVICAL SPINE DEMYELINATING W W/O CONTRAST, MRI THORACIC SPINE DEMYELINATING W W/O CONTRAST.    .  COMPARISON: None available.    FINDINGS:  Alignment is normal.  No evidence of aggressive osseous lesion or acute compression deformity.  Vertebral body heights are maintained.  Mild disc height loss at  C5-6.  There is a 3.2 x 0.8 cm (C and AP dimensions) T2 STIR hyperintense lesion in the anterior medulla/upper cervical cord at the level of C1.  No evidence of abnormal focal enhancement.    C2-3: No significant thecal sac or foraminal stenosis.    C3-4: No significant thecal sac or foraminal stenosis.    C4-5: Trace disc bulge with mild spinal canal stenosis.  No significant neural foraminal narrowing.    C5-6: Small disc osteophyte complex with left paracentral disc protrusion/extrusion that indents upon the cord.  Moderate spinal canal stenosis with severe bilateral neural foraminal narrowing.    C6-7: No significant thecal sac or foraminal stenosis.    C7-T1: No significant thecal sac or foraminal stenosis.    Paravertebral soft tissues are unremarkable.    Thoracic spine:  Vertebral body height is normal and alignment is maintained.  Marrow signal is within normal limits.  Disc spaces are relatively maintained.  Questionable scattered increased T2 STIR signal in the cord at the level of T2, T5-6, T7, T8-9,  No significant spinal canal stenosis or neuroforaminal narrowing.  No abnormal enhancement.                                         CT Head Without Contrast (Final result)  Result time 07/22/25 12:24:14      Final result by Jl Melendrez MD (07/22/25 12:24:14)                   Impression:      1.  Negative for acute intracranial process. Negative for hemorrhage or skull fracture.  2.  Stable extra-axial 1.6 cm based mass along the right lateral convexity, most consistent with a meningioma.  3. Other stable findings as noted above.      All CT scans at [this location] are performed using dose modulation techniques as appropriate to a performed exam including the following: automated exposure control; adjustment of the mA and/or kV according to patient size (this includes techniques or standardized protocols for targeted exams where dose is matched to indication / reason for exam; i.e. extremities or  head); use of iterative reconstruction technique.    Finalized on: 7/22/2025 12:24 PM By:  Jl Melendrez MD  [facility]# [id number]      2025-07-22 12:26:17.048     [facility]               Narrative:      EXAM: CT HEAD WITHOUT CONTRAST    CLINICAL HISTORY: Headache    TECHNIQUE: Axial images through the brain and posterior fossa were obtained without the use of IV contrast.  Sagittal and coronal reconstructions are provided for review.    FINDINGS:  Comparisons are made to 02/23/2024.  The ventricles are midline and the CSF spaces are normal. The gray-white matter junction is well preserved. Negative for intracranial vascular abnormalities.    There is a stable 1.6 cm dural based extra-axial mass along the right lateral convexity as measured on coronal image 49.  Negative for other mass, mass effect, cerebral edema, hemorrhage or abnormal fluid collections.        The skull and scalp are     intact. Hypoplastic frontal sinuses.  The rest of the paranasal sinuses, mastoid air cells, middle ears and ear canals are clear. The globes are intact.                                         The EKG was ordered, reviewed, and independently interpreted by the ED provider.  Interpretation time: 9:48  Rate: 64 BPM  Rhythm: Sinus rhythm with marked sinus arrythmia  Interpretation: Low voltage QRS. Cannot rule out Anterior infarct (cited on or before 10-Mar-2025). No STEMI.         The Emergency Provider reviewed the vital signs and test results, which are outlined above.     ED Discussion     11:45 AM: Discussed case with Soila Bucio NP (Hospital Medicine). Dr. Rousseau agrees with current care and management of pt and accepts admission.   Admitting Service: Hospital Medicine  Admitting Physician: Dr. Rousseau  Admit to: obs med/tele    11:54 AM: Re-evaluated pt.  I have discussed test results, shared treatment plan, and the need for admission with patient/family/caretaker at bedside. Pt and/or family/caretaker express understanding at this time  and agree with all information. All questions answered. Pt/caretaker/family member(s) have no further questions or concerns at this time. Pt is ready for admit.        ED Course as of 07/23/25 1540   Tue Jul 22, 2025   1047 44-year-old female with history of ?demyelinating process discovered in March 2025 after admission to [facility] for stroke-like symptoms.  Cervical enhancing lesion was discovered on MRI of the C-spine.  She also has an incidental meningioma which was evaluated by Neurosurgery without recommendation for further intervention.  During her March hospitalization she was treated with 5 days of IVIG and steroids with improvement in her left-sided weakness.  States she has an appointment with Neurology 08/03/2025.  Presents today for evaluation 2 weeks worsening generalized headache described as pressure as well as pain to the neck and shoulders.  Also feels like air is blowing out of her ears.  For the last 2 days she has had bilateral facial tingling near her ears cheeks.  She does have intermittent chest pains however she is not concerned about chest pain and does not have any chest pain at this time.  Denies fever or chills.  Denies extremity weakness or numbness.  Differentials considered include but not limited to atypical migraine, relapse of her demyelinating process, dehydration, other.  Plan to repeat labs and obtain CT of the head.  Will reach out to neurology for their recommendations.  May need to consider admission for MRI and further workup. [KF]   1136 Discussed with neurology in [place] who recommended repeat MRI brain/cervical/thoracic demyelinating protocol and admission for further workup/tele neurology consult.  [KF]   1136 I have consulted Hospital Medicine service for admission. [KF]   1215 CT Head Without Contrast  My independent interpretation: no bleed, no midline shift [KF]   1228 April with hospital medicine will see the patient.  [KF]   1300 My independent interpretation of  laboratory results: unremarkable workup, no concerning findings [KF]      ED Course User Index  [KF] Tiana Verdugo DO     Medical Decision Making  Please see ED course for documentation for all details regarding initial assessment, differential diagnoses, initial plan, lab interpretations, imaging interpretations, MDM, and final disposition.       Amount and/or Complexity of Data Reviewed  Labs: ordered. Decision-making details documented in ED Course.  Radiology: ordered and independent interpretation performed. Decision-making details documented in ED Course.  ECG/medicine tests: ordered and independent interpretation performed. Decision-making details documented in ED Course.    Risk  Prescription drug management.                ED Medication(s):  Medications   ketorolac injection 15 mg (15 mg Intravenous Given 7/22/25 1149)   gadobutroL (GADAVIST) injection 10 mL (9 mLs Intravenous Given 7/22/25 4061)       Discharge Medication List as of 7/23/2025 12:17 PM        START taking these medications    Details   senna-docusate (SENNA PLUS) 8.6-50 mg per tablet Take 1 tablet by mouth daily as needed., Starting Wed 7/23/2025, Until Mon 7/28/2025 at 2359, Normal              Follow-up Information       Camila Ruby MD Follow up in 1 week(s).    Specialty: Internal Medicine  Contact information:  [address] [place] [address] A  [facility] [id number] [phone]                                 Scribe Attestation:   Scribe #1: I performed the above scribed service and the documentation accurately describes the services I performed. I attest to the accuracy of the note.     Attending:   Physician Attestation Statement for Scribe #1: I, Tiana Verdugo DO, personally performed the services described in this documentation, as scribed by Sienna Epperson, in my presence, and it is both accurate and complete.           Clinical Impression       ICD-10-CM ICD-9-CM   1. Chronic nonintractable  headache, unspecified headache type  R51.9 784.0    G89.29    2. Chest pain  R07.9 786.50   3. Facial tingling sensation  R20.2 782.0   4. Neck pain  M54.2 723.1   5. Nausea  R11.0 787.02   6. Meningioma  D32.9 225.2   7. Focal neurological symptom present  R29.818 781.99   8. Demyelinating disease  G37.9 341.9   9. Degeneration of intervertebral disc at L5-S1 level  M51.379 722.52   10. Degeneration of C5-C6 intervertebral disc  M50.322 722.4   11. Acute pain of left shoulder  M25.512 719.41       Disposition:   Disposition: Placed in Observation  Condition: Tiana Lopez DO  07/23/25 1547

## 2025-07-22 NOTE — SUBJECTIVE & OBJECTIVE
Past Medical History:   Diagnosis Date    Asthma        Past Surgical History:   Procedure Laterality Date    BREAST LUMPECTOMY Right     HYSTERECTOMY      LEG SURGERY Left 2018    fracture repair    TUBAL LIGATION         Review of patient's allergies indicates:   Allergen Reactions    Penicillins Hives       No current facility-administered medications on file prior to encounter.     Current Outpatient Medications on File Prior to Encounter   Medication Sig    aspirin 81 MG Chew Take 81 mg by mouth.    diclofenac (VOLTAREN) 75 MG EC tablet Take 75 mg by mouth 2 (two) times daily.    methocarbamoL (ROBAXIN) 750 MG Tab Take 750 mg by mouth 3 (three) times daily as needed (Muscle Spasms).    acetaminophen (TYLENOL) 500 MG tablet Take 500 mg by mouth every 6 (six) hours as needed for Pain.    atorvastatin (LIPITOR) 40 MG tablet Take 40 mg by mouth.    dextromethorphan-guaifenesin  mg/5 ml (ROBITUSSIN-DM)  mg/5 mL liquid Take 5 mLs by mouth every 6 (six) hours as needed (cough).    ergocalciferol (ERGOCALCIFEROL) 50,000 unit Cap Take 50,000 Units by mouth every 7 days.    HYDROcodone-acetaminophen (NORCO) 5-325 mg per tablet Take 1 tablet by mouth every 6 (six) hours as needed for Pain.    ibuprofen (ADVIL,MOTRIN) 800 MG tablet Take 1 tablet (800 mg total) by mouth every 6 (six) hours as needed for Pain.    methylPREDNISolone (MEDROL DOSEPACK) 4 mg tablet Take as directed    traMADol (ULTRAM) 50 mg tablet Take 1 tablet (50 mg total) by mouth every 6 (six) hours as needed for Pain.    traMADoL (ULTRAM) 50 mg tablet Take 1 tablet (50 mg total) by mouth every 6 (six) hours as needed for Pain.     Family History       Problem Relation (Age of Onset)    Diabetes Mother    Heart disease Mother    Hypertension Mother          Tobacco Use    Smoking status: Every Day     Current packs/day: 1.00     Types: Cigarettes    Smokeless tobacco: Never    Tobacco comments:     no smoking after m.n prior to sx   Substance  and Sexual Activity    Alcohol use: No    Drug use: No    Sexual activity: Yes     Partners: Male     Review of Systems   Constitutional:  Positive for activity change, appetite change and fatigue.   Eyes:  Positive for pain.   Gastrointestinal:  Positive for nausea.   Neurological:  Positive for weakness, numbness (facial numbness and tingling) and headaches.   All other systems reviewed and are negative.    Objective:     Vital Signs (Most Recent):  Temp: 98.7 °F (37.1 °C) (07/22/25 0950)  Pulse: (!) 51 (07/22/25 1315)  Resp: 20 (07/22/25 1315)  BP: 109/64 (07/22/25 1315)  SpO2: 100 % (07/22/25 1315) Vital Signs (24h Range):  Temp:  [98.7 °F (37.1 °C)] 98.7 °F (37.1 °C)  Pulse:  [51-79] 51  Resp:  [16-20] 20  SpO2:  [98 %-100 %] 100 %  BP: ()/(64-83) 109/64        There is no height or weight on file to calculate BMI.     Physical Exam  Vitals and nursing note reviewed.   Constitutional:       General: She is not in acute distress.     Appearance: She is well-developed. She is ill-appearing.   HENT:      Head: Normocephalic and atraumatic.      Right Ear: Hearing and external ear normal.      Left Ear: Hearing and external ear normal.      Nose: No rhinorrhea.      Right Sinus: No maxillary sinus tenderness or frontal sinus tenderness.      Left Sinus: No maxillary sinus tenderness or frontal sinus tenderness.      Mouth/Throat:      Mouth: No oral lesions.      Pharynx: Uvula midline.   Eyes:      General:         Right eye: No discharge.         Left eye: No discharge.      Conjunctiva/sclera: Conjunctivae normal.      Pupils: Pupils are equal, round, and reactive to light.   Neck:      Thyroid: No thyromegaly.      Vascular: No carotid bruit.      Trachea: No tracheal deviation.   Cardiovascular:      Rate and Rhythm: Normal rate and regular rhythm.      Pulses:           Dorsalis pedis pulses are 2+ on the right side and 2+ on the left side.      Heart sounds: Normal heart sounds, S1 normal and S2  normal. No murmur heard.  Pulmonary:      Effort: Pulmonary effort is normal. No respiratory distress.      Breath sounds: Normal breath sounds.   Abdominal:      General: Bowel sounds are normal. There is no distension.      Palpations: Abdomen is soft. There is no mass.      Tenderness: There is no abdominal tenderness.   Musculoskeletal:         General: Normal range of motion.      Cervical back: Normal range of motion.   Lymphadenopathy:      Cervical: No cervical adenopathy.      Upper Body:      Right upper body: No supraclavicular adenopathy.      Left upper body: No supraclavicular adenopathy.   Skin:     General: Skin is warm and dry.      Capillary Refill: Capillary refill takes less than 2 seconds.      Findings: No rash.   Neurological:      Mental Status: She is alert and oriented to person, place, and time.      Sensory: Sensory deficit present.      Coordination: Coordination normal.      Gait: Gait normal.   Psychiatric:         Mood and Affect: Mood is not anxious or depressed.         Speech: Speech normal.         Behavior: Behavior normal.         Thought Content: Thought content normal.         Judgment: Judgment normal.              CRANIAL NERVES     CN III, IV, VI   Pupils are equal, round, and reactive to light.       Significant Labs: All pertinent labs within the past 24 hours have been reviewed.  CBC:   Recent Labs   Lab 07/22/25  1150   WBC 6.10   HGB 12.7   HCT 40.3        CMP:   Recent Labs   Lab 07/22/25  1101      K 4.0      CO2 20*   GLU 94   BUN 7   CREATININE 0.7   CALCIUM 9.0   PROT 7.2   ALBUMIN 3.9   BILITOT 0.4   ALKPHOS 70   AST 22   ALT 30   ANIONGAP 9       Significant Imaging: I have reviewed all pertinent imaging results/findings within the past 24 hours.

## 2025-07-23 VITALS
HEIGHT: 67 IN | TEMPERATURE: 99 F | DIASTOLIC BLOOD PRESSURE: 64 MMHG | BODY MASS INDEX: 31.45 KG/M2 | WEIGHT: 200.38 LBS | HEART RATE: 76 BPM | RESPIRATION RATE: 17 BRPM | OXYGEN SATURATION: 99 % | SYSTOLIC BLOOD PRESSURE: 110 MMHG

## 2025-07-23 LAB
ABSOLUTE EOSINOPHIL (OHS): 0.24 K/UL
ABSOLUTE MONOCYTE (OHS): 0.49 K/UL (ref 0.3–1)
ABSOLUTE NEUTROPHIL COUNT (OHS): 2.86 K/UL (ref 1.8–7.7)
ALBUMIN SERPL BCP-MCNC: 3.7 G/DL (ref 3.5–5.2)
ALP SERPL-CCNC: 69 UNIT/L (ref 40–150)
ALT SERPL W/O P-5'-P-CCNC: 25 UNIT/L (ref 10–44)
ANION GAP (OHS): 7 MMOL/L (ref 8–16)
AST SERPL-CCNC: 16 UNIT/L (ref 11–45)
BASOPHILS # BLD AUTO: 0.04 K/UL
BASOPHILS NFR BLD AUTO: 0.7 %
BILIRUB SERPL-MCNC: 0.3 MG/DL (ref 0.1–1)
BUN SERPL-MCNC: 10 MG/DL (ref 6–20)
CALCIUM SERPL-MCNC: 9.2 MG/DL (ref 8.7–10.5)
CHLORIDE SERPL-SCNC: 110 MMOL/L (ref 95–110)
CO2 SERPL-SCNC: 22 MMOL/L (ref 23–29)
CREAT SERPL-MCNC: 0.7 MG/DL (ref 0.5–1.4)
ERYTHROCYTE [DISTWIDTH] IN BLOOD BY AUTOMATED COUNT: 13.3 % (ref 11.5–14.5)
GFR SERPLBLD CREATININE-BSD FMLA CKD-EPI: >60 ML/MIN/1.73/M2
GLUCOSE SERPL-MCNC: 89 MG/DL (ref 70–110)
HCT VFR BLD AUTO: 37.2 % (ref 37–48.5)
HGB BLD-MCNC: 12 GM/DL (ref 12–16)
IMM GRANULOCYTES # BLD AUTO: 0.01 K/UL (ref 0–0.04)
IMM GRANULOCYTES NFR BLD AUTO: 0.2 % (ref 0–0.5)
LYMPHOCYTES # BLD AUTO: 2.09 K/UL (ref 1–4.8)
MAGNESIUM SERPL-MCNC: 1.7 MG/DL (ref 1.6–2.6)
MCH RBC QN AUTO: 27.5 PG (ref 27–31)
MCHC RBC AUTO-ENTMCNC: 32.3 G/DL (ref 32–36)
MCV RBC AUTO: 85 FL (ref 82–98)
NUCLEATED RBC (/100WBC) (OHS): 0 /100 WBC
OHS QRS DURATION: 102 MS
OHS QTC CALCULATION: 420 MS
PLATELET # BLD AUTO: 279 K/UL (ref 150–450)
PMV BLD AUTO: 8.8 FL (ref 9.2–12.9)
POTASSIUM SERPL-SCNC: 3.9 MMOL/L (ref 3.5–5.1)
PROT SERPL-MCNC: 7 GM/DL (ref 6–8.4)
RBC # BLD AUTO: 4.36 M/UL (ref 4–5.4)
RELATIVE EOSINOPHIL (OHS): 4.2 %
RELATIVE LYMPHOCYTE (OHS): 36.5 % (ref 18–48)
RELATIVE MONOCYTE (OHS): 8.6 % (ref 4–15)
RELATIVE NEUTROPHIL (OHS): 49.8 % (ref 38–73)
SODIUM SERPL-SCNC: 139 MMOL/L (ref 136–145)
WBC # BLD AUTO: 5.73 K/UL (ref 3.9–12.7)

## 2025-07-23 PROCEDURE — G0378 HOSPITAL OBSERVATION PER HR: HCPCS

## 2025-07-23 PROCEDURE — 80053 COMPREHEN METABOLIC PANEL: CPT | Performed by: NURSE PRACTITIONER

## 2025-07-23 PROCEDURE — 83735 ASSAY OF MAGNESIUM: CPT | Performed by: NURSE PRACTITIONER

## 2025-07-23 PROCEDURE — 85025 COMPLETE CBC W/AUTO DIFF WBC: CPT | Performed by: NURSE PRACTITIONER

## 2025-07-23 PROCEDURE — 25000003 PHARM REV CODE 250: Performed by: NURSE PRACTITIONER

## 2025-07-23 RX ORDER — DICLOFENAC SODIUM 75 MG/1
75 TABLET, DELAYED RELEASE ORAL 2 TIMES DAILY PRN
Start: 2025-07-23

## 2025-07-23 RX ORDER — AMOXICILLIN 250 MG
1 CAPSULE ORAL DAILY PRN
Qty: 5 TABLET | Refills: 0 | Status: SHIPPED | OUTPATIENT
Start: 2025-07-23 | End: 2025-07-28

## 2025-07-23 RX ORDER — HYDROCODONE BITARTRATE AND ACETAMINOPHEN 5; 325 MG/1; MG/1
1 TABLET ORAL EVERY 6 HOURS PRN
Qty: 12 TABLET | Refills: 0 | Status: SHIPPED | OUTPATIENT
Start: 2025-07-23 | End: 2025-07-28

## 2025-07-23 RX ADMIN — ACETAMINOPHEN 650 MG: 325 TABLET ORAL at 07:07

## 2025-07-23 RX ADMIN — HYDROCODONE BITARTRATE AND ACETAMINOPHEN 1 TABLET: 10; 325 TABLET ORAL at 11:07

## 2025-07-23 NOTE — PLAN OF CARE
[facility] - Telemetry (Hospital)  Discharge Final Note    Primary Care Provider: Camila Ruby MD    Expected Discharge Date: 7/23/2025    Final Discharge Note (most recent)       Final Note - 07/23/25 1226          Final Note    Assessment Type Final Discharge Note     Anticipated Discharge Disposition Home or Self Care     Hospital Resources/Appts/Education Provided Appointments scheduled and added to AVS        Post-Acute Status    Discharge Delays None known at this time                   Pt to discharge home today.  SW assisted with scheduling close Neuro follow up with [facility]: Hospital Follow Up with ANGÉLICA Duque  Monday Jul 28, 2025 11:30 AM    Important Message from Medicare             Contact Info       Camila Ruby MD   Specialty: Internal Medicine   Relationship: PCP - General    [address] [place] [address] A  [facility] [id number]   Phone: [phone]       Next Steps: Follow up in 1 week(s)

## 2025-07-23 NOTE — ASSESSMENT & PLAN NOTE
C/O facial numbness and tingling for previous 2 weeks, reports worsening  Patient has a known meningioma, demyelinating lesion. Treated with IVIG and steroid 3/2025 at [facility] with improvement in sx.   Neurology e-consult- recommended to repeat MRI's    --Neurology consulted  --MRI brain, C-Spine and L-Spine- demyelination protocol

## 2025-07-23 NOTE — PLAN OF CARE
[facility] - Telemetry (Hospital)  Discharge Assessment    Primary Care Provider: Camila Ruby MD     Discharge Assessment (most recent)       BRIEF DISCHARGE ASSESSMENT - 07/23/25 2793          Discharge Planning    Assessment Type Discharge Planning Brief Assessment     Resource/Environmental Concerns none     Support Systems Family members;Children     Equipment Currently Used at Home none     Current Living Arrangements home     Patient/Family Anticipates Transition to home;home with family     Patient/Family Anticipated Services at Transition none     DME Needed Upon Discharge  none     Discharge Plan A Home                    Anticipated DC dispo: Home  Prior Level of Function: Lives at home with daughter, independent with Adls, works and drives  PCP: MD Tung       Comments: No CM needs expressed at this time. Pt's whiteboard updated with CM contact and anticipated discharge disposition. SW to remain available as needed.

## 2025-07-23 NOTE — DISCHARGE INSTRUCTIONS
Our goal at [facility] is to always give you outstanding care and exceptional service. You may receive a survey from [employer] by mail, text or e-mail in the next 24-48 hours asking about the care you received with us. The survey should only take 5-10 minutes to complete and is very important to us.     Your feedback provides us with a way to recognize our staff who work tirelessly to provide the best care! Also, your responses help us learn how to improve when your experience was below our aspiration of excellence. We are always looking for ways to improve your stay. We WILL use your feedback to continue making improvements to help us provide the highest quality care. We keep your personal information and feedback confidential. We appreciate your time completing this survey and can't wait to hear from you!!!    We look forward to your continued care with us! Thanks so much for choosing [facility] for your healthcare needs!     Recommend compliance with medications, outpatient follow-up visits

## 2025-07-23 NOTE — CONSULTS
[facility] VASCULAR NEUROLOGY  Response for E-Consult     Patient Name: Janice Kerr  MRN: [id number]  Primary Care Provider: Camila Ruby MD   Requesting Provider: Tiana Verdugo DO  Consults  Reason for consult: pt with history of Meningioma, Demyelinating Lesion, CVA. asthma who presents to the Emergency Department for evaluation of multiple complaints which began 2 weeks ago, including facial tingling, HA, neck pain, nausea, vomiting, poor balance.    Recommendation: MRI brain and cervico-thoracic spine demyelinating protocol. No need for steroids.    Additional future steps to consider: outpatient follow up with [facility] clinic     Total time of Consultation: 10 minute    I did speak to the requesting provider verbally about this.     *This eConsult is based on the clinical data available to me and is furnished without benefit of a physical examination. The eConsult will need to be interpreted in light of any clinical issues or changes in patient status not available to me at the time of filing this eConsults. Significant changes in patient condition or level of acuity should result in immediate formal consultation and reevaluation. Please alert me if you have further questions.    Thank you for this eConsult referral.     Howard Rockwell MD  [facility] VASCULAR NEUROLOGY

## 2025-07-23 NOTE — HOSPITAL COURSE
Pt with history of Meningioma, Demyelinating Lesion, CVA. asthma who presents to the Emergency Department for evaluation of multiple complaints which began 2 weeks ago, including facial tingling, HA, neck pain, nausea, vomiting;  Case discussed with Neurology, recommended to observe and obtain repeat MRI of Brain, C-Spine with demyelination protocol.   MRI brain demyelinating with and without contrast-- Multiple white matter lesions involving the subcortical and periventricular white matter, middle cerebellar peduncles, chidi, and brainstem/spinal cord, nonspecific but most suggestive of demyelinating disease in the clinical setting of multiple sclerosis.  Enhancing lesions in the right parietal periventricular white matter and in the left parietal subcortical white matter suggesting active demyelination. Small right frontal convexity meningioma.  MRI cervical spine demyelinating with and without contrast--  3.2 x 0.8 cm white matter lesion in the anterior medulla/upper cervical cord at the level of C1 and questionable scattered increased T2 signal throughout the thoracic spine highly suggestive of demyelinating disease.  No abnormal postcontrast enhancement. Left paracentral disc protrusion/extrusion with moderate spinal canal stenosis and severe bilateral neuroforaminal narrowing at C5-6.  MRI thoracic spine demyelinating with and without contrast -- 3.2 x 0.8 cm white matter lesion in the anterior medulla/upper cervical cord at the level of C1 and questionable scattered increased T2 signal throughout the thoracic spine highly suggestive of demyelinating disease.  No abnormal postcontrast enhancement. Left paracentral disc protrusion/extrusion with moderate spinal canal stenosis and severe bilateral neuroforaminal narrowing at C5-6.  Imaging findings discussed with neurologist dr. Howard Rockwell-- stated that per his review of MRIs, some flattening of the cord noted, no definitive cord compression noted.  Stated  no immediate need for neurosurgery intervention at this point.  However recommended outpatient follow up with Neurosurgery, also recommended outpatient follow up with Neurology/[facility];  7/23   Examination done at bedside   Appeared alert and oriented x3   Stated improvement in headache, denied any further nausea, vomiting   Denied any tingling, numbness, extremity weakness, ambulating without issues; denied any difficulty with bowel or bladder issues   Appeared hemodynamically stable   Afebrile, no leukocytosis   Patient has initially neurology outpatient follow up on 08/08, however discussed with  to get appointment as soon as possible, able to get within 1 week.  Patient agreed with the discharge plan with outpatient follow up, strongly emphasized on compliance with medications, outpatient follow up visits, expressed understanding, agreed with the plan

## 2025-07-23 NOTE — DISCHARGE SUMMARY
"[facility] Medicine  Discharge Summary      Patient Name: Janice Kerr  MRN: [id number]  CHAPIS: [id number]  Patient Class: OP- Observation  Admission Date: 7/22/2025  Hospital Length of Stay: 0 days  Discharge Date and Time: 07/23/2025 4:18 PM  Attending Physician: Joann att. providers found   Discharging Provider: Rajesh Rousseau MD  Primary Care Provider: Camila Ruby MD    Primary Care Team: Networked reference to record PCT     HPI:   44 y.o. female, comorbid conditions include Meningioma, Demyelinating Lesion, CVA. asthma who presents to the Emergency Department for evaluation of multiple complaints which began 2 weeks ago. Pt was admitted to [facility] on 3/23/25 and evaluated by the Neurology department for similar symptoms. The patient was diagnosed with a Meningioma and advised to follow up with neurology as outpatient, the follow-up appointment was scheduled for 8/3/2025. In addition to the meningioma, patient was dx with a CVA and MRI C/T/L spine which showed upper cervical cord enhancing lesion, degenerative changes at C5-6 and degenerative changes at L5-S1 during that admission. Pt reports that for 2 weeks she has been experiencing a pressure like headache, blurred vision, neck pain, nausea and shoulder pain. Pt notes concern when both sides of her face started tingling and her ears felt "full and hot" which started 2 days ago. She states that she is compliant with all of her medications. Pt admits to smoking and using 500 mg of Tylenol 3x a day.  Patient denies any numbness, fever, chills or urinary incontinence. In the ED, vitals: 119/83, 79, 16, 98.7F, 100% RA on arrival. Labs normal. CT Head: NAF, Stable extra-axial 1.6 cm based mass along the right lateral convexity, most consistent with a meningioma. Case discussed with Neurology, recommended to observe and obtain repeat MRI of Brain, C-Spine with demyelination protocol.   After evaluating the " patients clinical presentation, vitals, labs, and risk factors, the attending physician and I decided to observe the patient for monitoring, diagnostics, and treatment due to the risk of deterioration if managed outpatient.  Patient is a full code. Placed in observation for further management of Headache, Focal Neurological Changes.     * No surgery found *      Hospital Course:    Pt with history of Meningioma, Demyelinating Lesion, CVA. asthma who presents to the Emergency Department for evaluation of multiple complaints which began 2 weeks ago, including facial tingling, HA, neck pain, nausea, vomiting;  Case discussed with Neurology, recommended to observe and obtain repeat MRI of Brain, C-Spine with demyelination protocol.   MRI brain demyelinating with and without contrast-- Multiple white matter lesions involving the subcortical and periventricular white matter, middle cerebellar peduncles, chidi, and brainstem/spinal cord, nonspecific but most suggestive of demyelinating disease in the clinical setting of multiple sclerosis.  Enhancing lesions in the right parietal periventricular white matter and in the left parietal subcortical white matter suggesting active demyelination. Small right frontal convexity meningioma.  MRI cervical spine demyelinating with and without contrast--  3.2 x 0.8 cm white matter lesion in the anterior medulla/upper cervical cord at the level of C1 and questionable scattered increased T2 signal throughout the thoracic spine highly suggestive of demyelinating disease.  No abnormal postcontrast enhancement. Left paracentral disc protrusion/extrusion with moderate spinal canal stenosis and severe bilateral neuroforaminal narrowing at C5-6.  MRI thoracic spine demyelinating with and without contrast -- 3.2 x 0.8 cm white matter lesion in the anterior medulla/upper cervical cord at the level of C1 and questionable scattered increased T2 signal throughout the thoracic spine highly  suggestive of demyelinating disease.  No abnormal postcontrast enhancement. Left paracentral disc protrusion/extrusion with moderate spinal canal stenosis and severe bilateral neuroforaminal narrowing at C5-6.  Imaging findings discussed with neurologist dr. Howard Rockwell-- stated that per his review of MRIs, some flattening of the cord noted, no definitive cord compression noted.  Stated no immediate need for neurosurgery intervention at this point.  However recommended outpatient follow up with Neurosurgery, also recommended outpatient follow up with Neurology/[facility];  7/23   Examination done at bedside   Appeared alert and oriented x3   Stated improvement in headache, denied any further nausea, vomiting   Denied any tingling, numbness, extremity weakness, ambulating without issues; denied any difficulty with bowel or bladder issues   Appeared hemodynamically stable   Afebrile, no leukocytosis   Patient has initially neurology outpatient follow up on 08/08, however discussed with  to get appointment as soon as possible, able to get within 1 week.  Patient agreed with the discharge plan with outpatient follow up, strongly emphasized on compliance with medications, outpatient follow up visits, expressed understanding, agreed with the plan     Goals of Care Treatment Preferences:  Code Status: Full Code         Consults:     Assessment & Plan  Focal neurological symptom present  C/O facial numbness and tingling for previous 2 weeks, reports worsening  Patient has a known meningioma, demyelinating lesion. Treated with IVIG and steroid 3/2025 at [facility] with improvement in sx.   Neurology e-consult- recommended to repeat MRI's    --Neurology consulted  --MRI brain, C-Spine and L-Spine- demyelination protocol    Degeneration of C5-C6 intervertebral disc  Previous finding of upper cervical cord enhancing lesion. Patient reports continued pain to BUE, shoulders, tingling, weakness    --MRI C-spine as  ordered  Degeneration of intervertebral disc at L5-S1 level    See as above  History of ischemic stroke  3/2025 acute CVA    --Cont ASA    Meningioma  Incidental finding 3/2025.     --Repeat MRI  Meralgia paresthetica of right side    Secondary to previous CVA    --Fall risk  --PT/OT in AM  Other complicated headache syndrome  Unclear etiology, suspected related to previous CVA, possible demyelinating disease, meningioma    --MRI Brain  --Pain control per MAR      Final Active Diagnoses:    Diagnosis Date Noted POA    PRINCIPAL PROBLEM:  Focal neurological symptom present [R29.818] 07/22/2025 Yes    Other complicated headache syndrome [G44.59] 07/22/2025 Yes    Degeneration of intervertebral disc at L5-S1 level [M51.379] 04/28/2025 Yes    Degeneration of C5-C6 intervertebral disc [M50.322] 04/28/2025 Yes    History of ischemic stroke [Z86.73] 04/28/2025 Not Applicable    Meningioma [D32.9] 03/23/2025 Yes    Meralgia paresthetica of right side [G57.11] 10/26/2021 Yes      Problems Resolved During this Admission:       Discharged Condition: fair    Disposition: Home or Self Care    Follow Up:   Follow-up Information       Caimla Ruby MD Follow up in 1 week(s).    Specialty: Internal Medicine  Contact information:  [address] [place] [address] A  [facility] [address] [phone]                           Patient Instructions:      Ambulatory referral/consult to Neurology   Standing Status: Future   Referral Priority: Routine Referral Type: Consultation   Referral Reason: Specialty Services Required   Requested Specialty: Neurology   Number of Visits Requested: 1     Ambulatory referral/consult to Neurosurgery   Standing Status: Future   Referral Priority: Routine Referral Type: Consultation   Referral Reason: Specialty Services Required   Requested Specialty: Neurosurgery   Number of Visits Requested: 1       Significant Diagnostic Studies:     Results for orders placed or  performed during the hospital encounter of 07/22/25   Comprehensive metabolic panel    Collection Time: 07/22/25 11:01 AM   Result Value Ref Range    Sodium 138 136 - 145 mmol/L    Potassium 4.0 3.5 - 5.1 mmol/L    Chloride 109 95 - 110 mmol/L    CO2 20 (L) 23 - 29 mmol/L    Glucose 94 70 - 110 mg/dL    BUN 7 6 - 20 mg/dL    Creatinine 0.7 0.5 - 1.4 mg/dL    Calcium 9.0 8.7 - 10.5 mg/dL    Protein Total 7.2 6.0 - 8.4 gm/dL    Albumin 3.9 3.5 - 5.2 g/dL    Bilirubin Total 0.4 0.1 - 1.0 mg/dL    ALP 70 40 - 150 unit/L    AST 22 11 - 45 unit/L    ALT 30 10 - 44 unit/L    Anion Gap 9 8 - 16 mmol/L    eGFR >60 >60 mL/min/1.73/m2   TSH    Collection Time: 07/22/25 11:01 AM   Result Value Ref Range    TSH 0.493 0.400 - 4.000 uIU/mL   Troponin I    Collection Time: 07/22/25 11:01 AM   Result Value Ref Range    Troponin-I <0.006 <=0.026 ng/mL   Brain natriuretic peptide    Collection Time: 07/22/25 11:01 AM   Result Value Ref Range    BNP 35 0 - 99 pg/mL   Protime-INR    Collection Time: 07/22/25 11:50 AM   Result Value Ref Range    PT 11.2 9.0 - 12.5 seconds    INR 1.0 0.8 - 1.2   APTT    Collection Time: 07/22/25 11:50 AM   Result Value Ref Range    PTT 27.9 21.0 - 32.0 seconds   CBC with Differential    Collection Time: 07/22/25 11:50 AM   Result Value Ref Range    WBC 6.10 3.90 - 12.70 K/uL    RBC 4.71 4.00 - 5.40 M/uL    HGB 12.7 12.0 - 16.0 gm/dL    HCT 40.3 37.0 - 48.5 %    MCV 86 82 - 98 fL    MCH 27.0 27.0 - 31.0 pg    MCHC 31.5 (L) 32.0 - 36.0 g/dL    RDW 13.3 11.5 - 14.5 %    Platelet Count 293 150 - 450 K/uL    MPV 9.1 (L) 9.2 - 12.9 fL    Nucleated RBC 0 <=0 /100 WBC    Neut % 55.9 38 - 73 %    Lymph % 32.8 18 - 48 %    Mono % 7.4 4 - 15 %    Eos % 3.0 <=8 %    Basophil % 0.7 <=1.9 %    Imm Grans % 0.2 0.0 - 0.5 %    Neut # 3.42 1.8 - 7.7 K/uL    Lymph # 2.00 1 - 4.8 K/uL    Mono # 0.45 0.3 - 1 K/uL    Eos # 0.18 <=0.5 K/uL    Baso # 0.04 <=0.2 K/uL    Imm Grans # 0.01 0.00 - 0.04 K/uL   Comprehensive Metabolic  Panel (CMP)    Collection Time: 07/23/25  5:47 AM   Result Value Ref Range    Sodium 139 136 - 145 mmol/L    Potassium 3.9 3.5 - 5.1 mmol/L    Chloride 110 95 - 110 mmol/L    CO2 22 (L) 23 - 29 mmol/L    Glucose 89 70 - 110 mg/dL    BUN 10 6 - 20 mg/dL    Creatinine 0.7 0.5 - 1.4 mg/dL    Calcium 9.2 8.7 - 10.5 mg/dL    Protein Total 7.0 6.0 - 8.4 gm/dL    Albumin 3.7 3.5 - 5.2 g/dL    Bilirubin Total 0.3 0.1 - 1.0 mg/dL    ALP 69 40 - 150 unit/L    AST 16 11 - 45 unit/L    ALT 25 10 - 44 unit/L    Anion Gap 7 (L) 8 - 16 mmol/L    eGFR >60 >60 mL/min/1.73/m2   Magnesium    Collection Time: 07/23/25  5:47 AM   Result Value Ref Range    Magnesium  1.7 1.6 - 2.6 mg/dL   CBC with Differential    Collection Time: 07/23/25  5:47 AM   Result Value Ref Range    WBC 5.73 3.90 - 12.70 K/uL    RBC 4.36 4.00 - 5.40 M/uL    HGB 12.0 12.0 - 16.0 gm/dL    HCT 37.2 37.0 - 48.5 %    MCV 85 82 - 98 fL    MCH 27.5 27.0 - 31.0 pg    MCHC 32.3 32.0 - 36.0 g/dL    RDW 13.3 11.5 - 14.5 %    Platelet Count 279 150 - 450 K/uL    MPV 8.8 (L) 9.2 - 12.9 fL    Nucleated RBC 0 <=0 /100 WBC    Neut % 49.8 38 - 73 %    Lymph % 36.5 18 - 48 %    Mono % 8.6 4 - 15 %    Eos % 4.2 <=8 %    Basophil % 0.7 <=1.9 %    Imm Grans % 0.2 0.0 - 0.5 %    Neut # 2.86 1.8 - 7.7 K/uL    Lymph # 2.09 1 - 4.8 K/uL    Mono # 0.49 0.3 - 1 K/uL    Eos # 0.24 <=0.5 K/uL    Baso # 0.04 <=0.2 K/uL    Imm Grans # 0.01 0.00 - 0.04 K/uL        Imaging Results              MRI Cervical Spine Demyelinating W W/O Contrast (Final result)  Result time 07/23/25 08:22:17      Final result by Brynn Osorio MD (07/23/25 08:22:17)                   Impression:      1.    3.2 x 0.8 cm white matter lesion in the anterior medulla/upper cervical cord at the level of C1 and questionable scattered increased T2 signal throughout the thoracic spine highly suggestive of demyelinating disease.  2.    No abnormal postcontrast enhancement.  3.    Left paracentral disc protrusion/extrusion with  moderate spinal canal stenosis and severe bilateral neuroforaminal narrowing at C5-6.    Finalized on: 7/23/2025 8:22 AM By:  Brynn Osorio MD  [facility]# [id number]      2025-07-23 08:24:18.587     [facility]               Narrative:    PROCEDURE:  MRI CERVICAL SPINE DEMYELINATING W W/O CONTRAST, MRI THORACIC SPINE DEMYELINATING W W/O CONTRAST    INDICATION:  Demyelinating lesion    TECHNIQUE: MRI CERVICAL SPINE DEMYELINATING W W/O CONTRAST, MRI THORACIC SPINE DEMYELINATING W W/O CONTRAST.    .  COMPARISON: None available.    FINDINGS:  Alignment is normal.  No evidence of aggressive osseous lesion or acute compression deformity.  Vertebral body heights are maintained.  Mild disc height loss at C5-6.  There is a 3.2 x 0.8 cm (C and AP dimensions) T2 STIR hyperintense lesion in the anterior medulla/upper cervical cord at the level of C1.  No evidence of abnormal focal enhancement.    C2-3: No significant thecal sac or foraminal stenosis.    C3-4: No significant thecal sac or foraminal stenosis.    C4-5: Trace disc bulge with mild spinal canal stenosis.  No significant neural foraminal narrowing.    C5-6: Small disc osteophyte complex with left paracentral disc protrusion/extrusion that indents upon the cord.  Moderate spinal canal stenosis with severe bilateral neural foraminal narrowing.    C6-7: No significant thecal sac or foraminal stenosis.    C7-T1: No significant thecal sac or foraminal stenosis.    Paravertebral soft tissues are unremarkable.    Thoracic spine:  Vertebral body height is normal and alignment is maintained.  Marrow signal is within normal limits.  Disc spaces are relatively maintained.  Questionable scattered increased T2 STIR signal in the cord at the level of T2, T5-6, T7, T8-9,  No significant spinal canal stenosis or neuroforaminal narrowing.  No abnormal enhancement.                                         MRI Brain Demyelinating W W/O Contrast (Final result)  Result time 07/23/25 07:56:58      Final  result by Oskar Garcia MD (07/23/25 07:56:58)                   Impression:      1.    Multiple white matter lesions involving the subcortical and periventricular white matter, middle cerebellar peduncles, chidi, and brainstem/spinal cord, nonspecific but most suggestive of demyelinating disease in the clinical setting of multiple sclerosis.  Enhancing lesions in the right parietal periventricular white matter and in the left parietal subcortical white matter suggesting active demyelination.  2.    Small right frontal convexity meningioma.    Finalized on: 7/23/2025 7:56 AM By:  Oskar Garcia MD  [facility]# [id number]      2025-07-23 07:59:06.758     [facility]               Narrative:    EXAM:  MRI BRAIN DEMYELINATING W/ WO CONTRAST    TECHNIQUE: Multiplanar multisequence MRI of the brain without and with administration of gadolinium based intravenous contrast.    INDICATION:  h/o c spine lesion concerning for demyelinating lesion.    COMPARISON:  None.    FINDINGS:  Multiple periventricular and subcortical white matter FLAIR hyperintense lesions, including those involving the corpus callosum and at the callosal margin.  Additional lesions present within the middle cerebellar peduncles, chidi, and the medulla/spinal cord.  The dominant lesion in the right parietal periventricular white matter with facilitated diffusion and contrast enhancement and smaller enhancing lesion with facilitative diffusion and enhancement in the left subcortical parietal white matter, most suggestive of active demyelination.    Homogenously enhancing extra-axial dural based mass along the right frontal convexity measuring 1.3 (ap) x 1.0 (trv) x 1.8 (cc) cm, compatible with meningioma.  Minor local mass effect.  No adjacent parenchymal edema.  No midline shift.    No evidence of acute infarct. No evidence of intraparenchymal hemorrhage. Maintained gray-white matter differentiation.  Normal size and configuration of the ventricular system. Preserved  central vascular flow voids.    Normal orbits. No significant signal abnormality within the mastoid air cells or paranasal sinuses. No skull or scalp abnormality.                                         MRI Thoracic Spine Demyelinating W W/O Contrast (Final result)  Result time 07/23/25 08:22:17      Final result by Brynn Osorio MD (07/23/25 08:22:17)                   Impression:      1.    3.2 x 0.8 cm white matter lesion in the anterior medulla/upper cervical cord at the level of C1 and questionable scattered increased T2 signal throughout the thoracic spine highly suggestive of demyelinating disease.  2.    No abnormal postcontrast enhancement.  3.    Left paracentral disc protrusion/extrusion with moderate spinal canal stenosis and severe bilateral neuroforaminal narrowing at C5-6.    Finalized on: 7/23/2025 8:22 AM By:  Brynn Osorio MD  [facility]# [id number]      2025-07-23 08:24:18.571     [facility]               Narrative:    PROCEDURE:  MRI CERVICAL SPINE DEMYELINATING W W/O CONTRAST, MRI THORACIC SPINE DEMYELINATING W W/O CONTRAST    INDICATION:  Demyelinating lesion    TECHNIQUE: MRI CERVICAL SPINE DEMYELINATING W W/O CONTRAST, MRI THORACIC SPINE DEMYELINATING W W/O CONTRAST.    .  COMPARISON: None available.    FINDINGS:  Alignment is normal.  No evidence of aggressive osseous lesion or acute compression deformity.  Vertebral body heights are maintained.  Mild disc height loss at C5-6.  There is a 3.2 x 0.8 cm (C and AP dimensions) T2 STIR hyperintense lesion in the anterior medulla/upper cervical cord at the level of C1.  No evidence of abnormal focal enhancement.    C2-3: No significant thecal sac or foraminal stenosis.    C3-4: No significant thecal sac or foraminal stenosis.    C4-5: Trace disc bulge with mild spinal canal stenosis.  No significant neural foraminal narrowing.    C5-6: Small disc osteophyte complex with left paracentral disc protrusion/extrusion that indents upon the cord.  Moderate spinal canal  stenosis with severe bilateral neural foraminal narrowing.    C6-7: No significant thecal sac or foraminal stenosis.    C7-T1: No significant thecal sac or foraminal stenosis.    Paravertebral soft tissues are unremarkable.    Thoracic spine:  Vertebral body height is normal and alignment is maintained.  Marrow signal is within normal limits.  Disc spaces are relatively maintained.  Questionable scattered increased T2 STIR signal in the cord at the level of T2, T5-6, T7, T8-9,  No significant spinal canal stenosis or neuroforaminal narrowing.  No abnormal enhancement.                                         CT Head Without Contrast (Final result)  Result time 07/22/25 12:24:14      Final result by Jl Melendrez MD (07/22/25 12:24:14)                   Impression:      1.  Negative for acute intracranial process. Negative for hemorrhage or skull fracture.  2.  Stable extra-axial 1.6 cm based mass along the right lateral convexity, most consistent with a meningioma.  3. Other stable findings as noted above.      All CT scans at [this location] are performed using dose modulation techniques as appropriate to a performed exam including the following: automated exposure control; adjustment of the mA and/or kV according to patient size (this includes techniques or standardized protocols for targeted exams where dose is matched to indication / reason for exam; i.e. extremities or head); use of iterative reconstruction technique.    Finalized on: 7/22/2025 12:24 PM By:  Jl Melendrez MD  [facility]# [id number]      2025-07-22 12:26:17.048     [facility]               Narrative:      EXAM: CT HEAD WITHOUT CONTRAST    CLINICAL HISTORY: Headache    TECHNIQUE: Axial images through the brain and posterior fossa were obtained without the use of IV contrast.  Sagittal and coronal reconstructions are provided for review.    FINDINGS:  Comparisons are made to 02/23/2024.  The ventricles are midline and the CSF spaces are normal. The  gray-white matter junction is well preserved. Negative for intracranial vascular abnormalities.    There is a stable 1.6 cm dural based extra-axial mass along the right lateral convexity as measured on coronal image 49.  Negative for other mass, mass effect, cerebral edema, hemorrhage or abnormal fluid collections.        The skull and scalp are     intact. Hypoplastic frontal sinuses.  The rest of the paranasal sinuses, mastoid air cells, middle ears and ear canals are clear. The globes are intact.                                         Pending Diagnostic Studies:       None           Medications:       Medication List        START taking these medications      STOOL SOFTENER-LAXATIVE 8.6-50 mg per tablet  Generic drug: senna-docusate  Take 1 tablet by mouth daily as needed.            CHANGE how you take these medications      diclofenac 75 MG EC tablet  Commonly known as: VOLTAREN  Take 1 tablet (75 mg total) by mouth 2 (two) times daily as needed.  What changed:   when to take this  reasons to take this     traMADoL 50 mg tablet  Commonly known as: ULTRAM  Take 1 tablet (50 mg total) by mouth every 6 (six) hours as needed for Pain.  What changed: Another medication with the same name was removed. Continue taking this medication, and follow the directions you see here.            CONTINUE taking these medications      acetaminophen 500 MG tablet  Commonly known as: TYLENOL     aspirin 81 MG Chew     atorvastatin 40 MG tablet  Commonly known as: LIPITOR     ergocalciferol 50,000 unit Cap  Commonly known as: ERGOCALCIFEROL     HYDROcodone-acetaminophen 5-325 mg per tablet  Commonly known as: NORCO  Take 1 tablet by mouth every 6 (six) hours as needed for Pain.     ibuprofen 800 MG tablet  Commonly known as: ADVIL,MOTRIN  Take 1 tablet (800 mg total) by mouth every 6 (six) hours as needed for Pain.     methocarbamoL 750 MG Tab  Commonly known as: ROBAXIN            STOP taking these medications       dextromethorphan-guaiFENesin  mg/5 ml  mg/5 mL liquid  Commonly known as: ROBITUSSIN-DM     methylPREDNISolone 4 mg tablet  Commonly known as: MEDROL DOSEPACK               Where to Get Your Medications        These medications were sent to [facility] [address] [place] [place] [place] [address]      Hours: Mon-Fri, 8a-5:30p Phone: [phone]   HYDROcodone-acetaminophen 5-325 mg per tablet  STOOL SOFTENER-LAXATIVE 8.6-50 mg per tablet       Information about where to get these medications is not yet available    Ask your nurse or doctor about these medications  diclofenac 75 MG EC tablet          Indwelling Lines/Drains at time of discharge:   Lines/Drains/Airways       None                       Time spent on the discharge of patient: 92 minutes         Rajesh Rousseau MD  Department of Hospital Medicine  [facility] - Telemetry ([facility])

## (undated) DEVICE — SEE MEDLINE ITEM 157173

## (undated) DEVICE — UNDERGLOVES BIOGEL PI SIZE 8.5

## (undated) DEVICE — SEE MEDLINE ITEM 157027

## (undated) DEVICE — BANDAGE ELASTIC 2X5 VELCRO ST

## (undated) DEVICE — SEE MEDLINE ITEM 152622

## (undated) DEVICE — LINER GLOVE POWDERFREE 8

## (undated) DEVICE — SEE MEDLINE ITEM 157131

## (undated) DEVICE — DRESSING N ADH OIL EMUL 3X3

## (undated) DEVICE — TOURNIQUET SB QC SP 18X4IN

## (undated) DEVICE — SEE MEDLINE ITEM 152529

## (undated) DEVICE — GLOVE SURG BIOGEL LATEX SZ 7.5

## (undated) DEVICE — COVER OVERHEAD SURG LT BLUE

## (undated) DEVICE — SEE MEDLINE ITEM 154981

## (undated) DEVICE — PAD CAST SPECIALIST STRL 4

## (undated) DEVICE — SEE MEDLINE ITEM 157117

## (undated) DEVICE — SEE MEDLINE ITEM 146308

## (undated) DEVICE — INSTRUMENT FRAZIER 10FR W/VENT

## (undated) DEVICE — ELECTRODE REM PLYHSV RETURN 9

## (undated) DEVICE — DRAPE MOBILE C-ARM

## (undated) DEVICE — BIT DRILL 3.5MM

## (undated) DEVICE — SPLINT PLASTER FAST SET 5X30IN

## (undated) DEVICE — GAUZE SPONGE 4X4 12PLY

## (undated) DEVICE — CONTAINER SPECIMEN STRL 4OZ

## (undated) DEVICE — MANIFOLD 4 PORT

## (undated) DEVICE — SEE MEDLINE ITEM 157216

## (undated) DEVICE — SEE MEDLINE ITEM 152522

## (undated) DEVICE — NDL SAFETY 25G X 1.5 ECLIPSE

## (undated) DEVICE — BIT DRILL 2.5

## (undated) DEVICE — GLOVE SURGICAL LATEX SZ 8

## (undated) DEVICE — DECANTER VIAL ASEPTIC TRANSFER

## (undated) DEVICE — GLOVE 8.5 PROTEXIS PI BLUE

## (undated) DEVICE — SUT ETHILON 3-0 PS2 18 BLK

## (undated) DEVICE — DRAPE PLASTIC U 60X72

## (undated) DEVICE — SYR 10CC LUER LOCK

## (undated) DEVICE — SOL BETADINE 5%

## (undated) DEVICE — GLOVE PROTEXIS HYDROGEL SZ7.5

## (undated) DEVICE — BIT DRILL QC STRL SS 3.2X145

## (undated) DEVICE — GLOVE 8 PROTEXIS PI BLUE

## (undated) DEVICE — GLOVE PROTEXIS HYDROGEL SZ8